# Patient Record
Sex: FEMALE | Race: WHITE | HISPANIC OR LATINO | URBAN - METROPOLITAN AREA
[De-identification: names, ages, dates, MRNs, and addresses within clinical notes are randomized per-mention and may not be internally consistent; named-entity substitution may affect disease eponyms.]

---

## 2017-03-29 ENCOUNTER — EMERGENCY (EMERGENCY)
Facility: HOSPITAL | Age: 62
LOS: 0 days | Discharge: HOME | End: 2017-03-29

## 2017-06-27 DIAGNOSIS — S01.81XD LACERATION WITHOUT FOREIGN BODY OF OTHER PART OF HEAD, SUBSEQUENT ENCOUNTER: ICD-10-CM
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
## 2017-08-27 ENCOUNTER — EMERGENCY (EMERGENCY)
Facility: HOSPITAL | Age: 62
LOS: 0 days | Discharge: HOME | End: 2017-08-27

## 2017-08-27 DIAGNOSIS — Z90.710 ACQUIRED ABSENCE OF BOTH CERVIX AND UTERUS: ICD-10-CM

## 2017-08-27 DIAGNOSIS — K80.70 CALCULUS OF GALLBLADDER AND BILE DUCT WITHOUT CHOLECYSTITIS WITHOUT OBSTRUCTION: ICD-10-CM

## 2017-08-27 DIAGNOSIS — Z98.890 OTHER SPECIFIED POSTPROCEDURAL STATES: ICD-10-CM

## 2017-08-27 DIAGNOSIS — K81.0 ACUTE CHOLECYSTITIS: ICD-10-CM

## 2017-08-27 DIAGNOSIS — Z90.49 ACQUIRED ABSENCE OF OTHER SPECIFIED PARTS OF DIGESTIVE TRACT: ICD-10-CM

## 2017-08-27 DIAGNOSIS — R10.11 RIGHT UPPER QUADRANT PAIN: ICD-10-CM

## 2017-08-28 ENCOUNTER — INPATIENT (INPATIENT)
Facility: HOSPITAL | Age: 62
LOS: 1 days | Discharge: HOME | End: 2017-08-30
Attending: SURGERY

## 2017-08-28 DIAGNOSIS — K81.0 ACUTE CHOLECYSTITIS: ICD-10-CM

## 2017-09-02 DIAGNOSIS — R10.9 UNSPECIFIED ABDOMINAL PAIN: ICD-10-CM

## 2017-09-02 DIAGNOSIS — K80.00 CALCULUS OF GALLBLADDER WITH ACUTE CHOLECYSTITIS WITHOUT OBSTRUCTION: ICD-10-CM

## 2017-09-02 DIAGNOSIS — Z90.710 ACQUIRED ABSENCE OF BOTH CERVIX AND UTERUS: ICD-10-CM

## 2017-09-02 DIAGNOSIS — Z87.442 PERSONAL HISTORY OF URINARY CALCULI: ICD-10-CM

## 2017-09-02 DIAGNOSIS — K82.8 OTHER SPECIFIED DISEASES OF GALLBLADDER: ICD-10-CM

## 2017-09-04 ENCOUNTER — EMERGENCY (EMERGENCY)
Facility: HOSPITAL | Age: 62
LOS: 0 days | Discharge: HOME | End: 2017-09-04

## 2017-09-04 DIAGNOSIS — R10.11 RIGHT UPPER QUADRANT PAIN: ICD-10-CM

## 2017-09-04 DIAGNOSIS — Z90.49 ACQUIRED ABSENCE OF OTHER SPECIFIED PARTS OF DIGESTIVE TRACT: ICD-10-CM

## 2017-09-04 DIAGNOSIS — R10.9 UNSPECIFIED ABDOMINAL PAIN: ICD-10-CM

## 2017-09-04 DIAGNOSIS — R10.31 RIGHT LOWER QUADRANT PAIN: ICD-10-CM

## 2017-09-04 DIAGNOSIS — G89.18 OTHER ACUTE POSTPROCEDURAL PAIN: ICD-10-CM

## 2017-09-04 DIAGNOSIS — K81.0 ACUTE CHOLECYSTITIS: ICD-10-CM

## 2017-09-05 ENCOUNTER — APPOINTMENT (OUTPATIENT)
Dept: SURGERY | Facility: CLINIC | Age: 62
End: 2017-09-05
Payer: COMMERCIAL

## 2017-09-05 VITALS — SYSTOLIC BLOOD PRESSURE: 119 MMHG | DIASTOLIC BLOOD PRESSURE: 85 MMHG | WEIGHT: 141 LBS

## 2017-09-05 PROCEDURE — 99024 POSTOP FOLLOW-UP VISIT: CPT

## 2017-09-11 ENCOUNTER — APPOINTMENT (OUTPATIENT)
Dept: SURGERY | Facility: CLINIC | Age: 62
End: 2017-09-11
Payer: MEDICAID

## 2017-09-11 VITALS
HEIGHT: 60 IN | WEIGHT: 135 LBS | BODY MASS INDEX: 26.5 KG/M2 | SYSTOLIC BLOOD PRESSURE: 115 MMHG | DIASTOLIC BLOOD PRESSURE: 80 MMHG

## 2017-09-11 DIAGNOSIS — Z83.1 FAMILY HISTORY OF OTHER INFECTIOUS AND PARASITIC DISEASES: ICD-10-CM

## 2017-09-11 DIAGNOSIS — Z78.9 OTHER SPECIFIED HEALTH STATUS: ICD-10-CM

## 2017-09-11 PROCEDURE — 99024 POSTOP FOLLOW-UP VISIT: CPT

## 2017-09-11 RX ORDER — IBUPROFEN 200 MG/1
200 TABLET, COATED ORAL
Refills: 0 | Status: ACTIVE | COMMUNITY

## 2017-10-12 ENCOUNTER — APPOINTMENT (OUTPATIENT)
Dept: INTERNAL MEDICINE | Facility: CLINIC | Age: 62
End: 2017-10-12

## 2018-01-30 ENCOUNTER — EMERGENCY (EMERGENCY)
Facility: HOSPITAL | Age: 63
LOS: 0 days | Discharge: HOME | End: 2018-01-30

## 2018-01-30 DIAGNOSIS — R42 DIZZINESS AND GIDDINESS: ICD-10-CM

## 2018-01-30 DIAGNOSIS — R63.0 ANOREXIA: ICD-10-CM

## 2018-01-30 DIAGNOSIS — K81.0 ACUTE CHOLECYSTITIS: ICD-10-CM

## 2018-01-30 DIAGNOSIS — F17.200 NICOTINE DEPENDENCE, UNSPECIFIED, UNCOMPLICATED: ICD-10-CM

## 2018-01-30 DIAGNOSIS — Z90.49 ACQUIRED ABSENCE OF OTHER SPECIFIED PARTS OF DIGESTIVE TRACT: ICD-10-CM

## 2018-01-30 DIAGNOSIS — R53.1 WEAKNESS: ICD-10-CM

## 2018-03-09 ENCOUNTER — APPOINTMENT (OUTPATIENT)
Dept: INTERNAL MEDICINE | Facility: CLINIC | Age: 63
End: 2018-03-09

## 2018-07-28 PROBLEM — Z78.9 ALCOHOL USE: Status: INACTIVE | Noted: 2017-09-11

## 2019-06-14 ENCOUNTER — LABORATORY RESULT (OUTPATIENT)
Age: 64
End: 2019-06-14

## 2019-06-14 ENCOUNTER — OUTPATIENT (OUTPATIENT)
Dept: OUTPATIENT SERVICES | Facility: HOSPITAL | Age: 64
LOS: 1 days | Discharge: HOME | End: 2019-06-14

## 2019-06-14 ENCOUNTER — APPOINTMENT (OUTPATIENT)
Dept: INTERNAL MEDICINE | Facility: CLINIC | Age: 64
End: 2019-06-14

## 2019-06-14 VITALS
WEIGHT: 155 LBS | TEMPERATURE: 95.6 F | HEART RATE: 73 BPM | BODY MASS INDEX: 30.43 KG/M2 | DIASTOLIC BLOOD PRESSURE: 87 MMHG | HEIGHT: 60 IN | SYSTOLIC BLOOD PRESSURE: 124 MMHG

## 2019-06-14 DIAGNOSIS — R30.0 DYSURIA: ICD-10-CM

## 2019-06-14 DIAGNOSIS — N89.8 OTHER SPECIFIED NONINFLAMMATORY DISORDERS OF VAGINA: ICD-10-CM

## 2019-06-14 DIAGNOSIS — R53.83 OTHER FATIGUE: ICD-10-CM

## 2019-06-14 DIAGNOSIS — Z72.0 TOBACCO USE: ICD-10-CM

## 2019-06-14 DIAGNOSIS — Z87.442 PERSONAL HISTORY OF URINARY CALCULI: ICD-10-CM

## 2019-06-14 RX ORDER — CONJUGATED ESTROGENS 0.62 MG/G
0.62 CREAM VAGINAL
Qty: 1 | Refills: 0 | Status: ACTIVE | COMMUNITY
Start: 2019-06-14 | End: 1900-01-01

## 2019-06-14 NOTE — COUNSELING
[Healthy eating counseling provided] : healthy eating [Weight management counseling provided] : Weight management [Activity counseling provided] : activity [Discussed Risks and Advised to Quit Smoking] : Discussed risks and advised to quit smoking [Weight Self Once Weekly] : Weight self once weekly [Low Fat Diet] : Low fat diet [Decrease Portions] : Decrease food portions [Walking] : Walking [Quit Smoking] : Quit Smoking

## 2019-06-17 DIAGNOSIS — N89.8 OTHER SPECIFIED NONINFLAMMATORY DISORDERS OF VAGINA: ICD-10-CM

## 2019-06-17 DIAGNOSIS — Z72.0 TOBACCO USE: ICD-10-CM

## 2019-06-17 DIAGNOSIS — R30.0 DYSURIA: ICD-10-CM

## 2019-06-17 DIAGNOSIS — R53.83 OTHER FATIGUE: ICD-10-CM

## 2019-06-17 DIAGNOSIS — Z87.442 PERSONAL HISTORY OF URINARY CALCULI: ICD-10-CM

## 2019-06-17 LAB
25(OH)D3 SERPL-MCNC: 22 NG/ML
ALBUMIN SERPL ELPH-MCNC: 4.7 G/DL
ALP BLD-CCNC: 114 U/L
ALT SERPL-CCNC: 16 U/L
ANION GAP SERPL CALC-SCNC: 14 MMOL/L
AST SERPL-CCNC: 19 U/L
BACTERIA UR CULT: NORMAL
BASOPHILS # BLD AUTO: 0.01 K/UL
BASOPHILS NFR BLD AUTO: 0.2 %
BILIRUB SERPL-MCNC: 0.6 MG/DL
BUN SERPL-MCNC: 16 MG/DL
CALCIUM SERPL-MCNC: 9.1 MG/DL
CHLORIDE SERPL-SCNC: 106 MMOL/L
CHOLEST SERPL-MCNC: 185 MG/DL
CHOLEST/HDLC SERPL: 3.9 RATIO
CO2 SERPL-SCNC: 25 MMOL/L
CREAT SERPL-MCNC: 0.6 MG/DL
EOSINOPHIL # BLD AUTO: 0.12 K/UL
EOSINOPHIL NFR BLD AUTO: 2.7 %
ESTIMATED AVERAGE GLUCOSE: 117 MG/DL
GLUCOSE SERPL-MCNC: 98 MG/DL
HBA1C MFR BLD HPLC: 5.7 %
HCT VFR BLD CALC: 40.3 %
HDLC SERPL-MCNC: 48 MG/DL
HGB BLD-MCNC: 13.5 G/DL
IMM GRANULOCYTES NFR BLD AUTO: 0.4 %
LDLC SERPL CALC-MCNC: 130 MG/DL
LYMPHOCYTES # BLD AUTO: 1.76 K/UL
LYMPHOCYTES NFR BLD AUTO: 39.2 %
MAN DIFF?: NORMAL
MCHC RBC-ENTMCNC: 30.1 PG
MCHC RBC-ENTMCNC: 33.5 G/DL
MCV RBC AUTO: 89.8 FL
MONOCYTES # BLD AUTO: 0.46 K/UL
MONOCYTES NFR BLD AUTO: 10.2 %
NEUTROPHILS # BLD AUTO: 2.12 K/UL
NEUTROPHILS NFR BLD AUTO: 47.3 %
PLATELET # BLD AUTO: 213 K/UL
POTASSIUM SERPL-SCNC: 4.6 MMOL/L
PROT SERPL-MCNC: 7.1 G/DL
RBC # BLD: 4.49 M/UL
RBC # FLD: 12 %
SODIUM SERPL-SCNC: 145 MMOL/L
TRIGL SERPL-MCNC: 130 MG/DL
TSH SERPL-ACNC: 2.1 UIU/ML
WBC # FLD AUTO: 4.49 K/UL

## 2019-06-18 NOTE — ASSESSMENT
[FreeTextEntry1] : This is a 63 year old female with no known PMHx presenting to the clinic to establish care. \par \par FATIGUE a/w LOWER EXTREMITY PAIN; POSSIBLY SECONDARY TO VARICOSE VEINS\par - Advised to wear compression stocking\par \par VAGINAL DRYNESS\par - Estrogen cream\par \par DYSURIA\par - Will check urinalysis\par \par HEALTH CARE MAINTENANCE\par - Pap smear 1 year ago, but doesn’t know the result\par - Follow up Mammogram\par - Follow up with GI for colonoscopy\par - Follow up lab work

## 2019-06-18 NOTE — REVIEW OF SYSTEMS
[Dysuria] : dysuria [Fatigue] : fatigue [Negative] : Heme/Lymph [FreeTextEntry9] : Lower extremity pain worsened with walking and prolonged standing

## 2019-06-18 NOTE — PHYSICAL EXAM
[No Acute Distress] : no acute distress [Well Nourished] : well nourished [Well Developed] : well developed [Well-Appearing] : well-appearing [Normal Sclera/Conjunctiva] : normal sclera/conjunctiva [PERRL] : pupils equal round and reactive to light [EOMI] : extraocular movements intact [Normal Outer Ear/Nose] : the outer ears and nose were normal in appearance [No JVD] : no jugular venous distention [Supple] : supple [No Respiratory Distress] : no respiratory distress  [Clear to Auscultation] : lungs were clear to auscultation bilaterally [Normal Rate] : normal rate  [Normal S1, S2] : normal S1 and S2 [Regular Rhythm] : with a regular rhythm [No Edema] : there was no peripheral edema [Soft] : abdomen soft [Non Tender] : non-tender [Non-distended] : non-distended [No CVA Tenderness] : no CVA  tenderness [No Rash] : no rash [No Spinal Tenderness] : no spinal tenderness [Coordination Grossly Intact] : coordination grossly intact [Normal Gait] : normal gait [No Focal Deficits] : no focal deficits [Normal Affect] : the affect was normal [Normal Insight/Judgement] : insight and judgment were intact [de-identified] : Varicose veins BL

## 2019-06-18 NOTE — HISTORY OF PRESENT ILLNESS
[FreeTextEntry1] : Establish care [de-identified] : This is a 63 year old female presenting to the clinic for the first time to establish care. She has a no known significant PMHx. She reports history of Cholecystectomy and Nephrolithiasis s/p Lithotripsy. today, she complains of feeling tired and pain in her legs that she described as dull. He pain is worse with walking and with prolonged standing at work. She also complains of acute dysuria (burning) without evidence of blood, back pain, or systemic signs of infection (fever, chills, ect.). ROS is otherwise negative.

## 2019-06-26 ENCOUNTER — EMERGENCY (EMERGENCY)
Facility: HOSPITAL | Age: 64
LOS: 0 days | Discharge: HOME | End: 2019-06-26
Admitting: EMERGENCY MEDICINE
Payer: MEDICAID

## 2019-06-26 VITALS
TEMPERATURE: 98 F | RESPIRATION RATE: 18 BRPM | SYSTOLIC BLOOD PRESSURE: 130 MMHG | DIASTOLIC BLOOD PRESSURE: 60 MMHG | HEART RATE: 80 BPM | OXYGEN SATURATION: 98 %
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
## 2019-06-26 DIAGNOSIS — S20.212A CONTUSION OF LEFT FRONT WALL OF THORAX, INITIAL ENCOUNTER: ICD-10-CM

## 2019-06-26 DIAGNOSIS — S60.212A CONTUSION OF LEFT WRIST, INITIAL ENCOUNTER: ICD-10-CM
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
## 2019-06-26 DIAGNOSIS — R07.81 PLEURODYNIA: ICD-10-CM

## 2019-06-26 PROCEDURE — 99283 EMERGENCY DEPT VISIT LOW MDM: CPT

## 2019-06-26 PROCEDURE — 73110 X-RAY EXAM OF WRIST: CPT | Mod: 26,LT

## 2019-06-26 PROCEDURE — 71046 X-RAY EXAM CHEST 2 VIEWS: CPT | Mod: 26

## 2019-06-26 PROCEDURE — 71100 X-RAY EXAM RIBS UNI 2 VIEWS: CPT | Mod: 26,LT

## 2019-06-26 RX ORDER — OXYCODONE AND ACETAMINOPHEN 5; 325 MG/1; MG/1
1 TABLET ORAL ONCE
Refills: 0 | Status: DISCONTINUED | OUTPATIENT
Start: 2019-06-26 | End: 2019-06-26

## 2019-06-26 RX ADMIN — OXYCODONE AND ACETAMINOPHEN 1 TABLET(S): 5; 325 TABLET ORAL at 10:19

## 2019-06-26 NOTE — ED PROVIDER NOTE - OBJECTIVE STATEMENT
65 yo F with no pmhx presenting with left sided rib pain and left wrist pain which started after mechanical fall on 6/24/19. Patient tripped on street and fell onto her left side. No head injury or LOC. No anticoagulation. Symptoms are moderate. Pain is worse with movement and palpation. No cp, sob, fever, chills, abdominal pain, nausea, vomiting, diarrhea, back pain, urinary symptoms, headache, dizziness, paresthesias, or weakness.

## 2019-06-26 NOTE — ED PROVIDER NOTE - CLINICAL SUMMARY MEDICAL DECISION MAKING FREE TEXT BOX
Pt with rib contusion and wrist contusion after mechanical fall. NSAIDS and incentive spirometer given. I have discussed the discharge plan with the patient. The patient agrees with the plan, as discussed.  The patient understands Emergency Department diagnosis is a preliminary diagnosis often based on limited information and that the patient must adhere to the follow-up plan as discussed.  The patient understands that if the symptoms worsen or if prescribed medications do not have the desired/planned effect that the patient may return to the Emergency Department at any time for further evaluation and treatment.

## 2019-06-26 NOTE — ED PROVIDER NOTE - NS ED ROS FT
Review of Systems:  	•	CONSTITUTIONAL - no fever, no diaphoresis, no chills  	•	SKIN - no rash  	•	HEMATOLOGIC - no bleeding, no bruising  	•	EYES - no eye pain, no blurry vision  	•	ENT - no congestion  	•	RESPIRATORY - no shortness of breath, no cough  	•	CARDIAC - no chest pain, no palpitations  	•	GI - no abd pain, no nausea, no vomiting, no diarrhea, no constipation  	•	GENITO-URINARY - no dysuria; no hematuria, no increased urinary frequency  	•	MUSCULOSKELETAL - +rib pain, wrist pain, no swelling, no redness  	•	NEUROLOGIC - no weakness, no headache, no paresthesias, no LOC  	•	PSYCH - no anxiety, no depression  	All other ROS are negative except as documented in HPI.

## 2019-06-26 NOTE — ED PROVIDER NOTE - PHYSICAL EXAMINATION
VITAL SIGNS: I have reviewed nursing notes and confirm.  CONSTITUTIONAL: Well-developed; well-nourished; in no acute distress.  SKIN: Skin exam is warm and dry, no acute rash. No ecchymosis or abrasions.   HEAD: Normocephalic; atraumatic.  EYES: PERRL, EOM intact; conjunctiva and sclera clear.  ENT: No nasal discharge; airway clear.   NECK: Supple; non tender.  CARD: S1, S2 normal; no murmurs, gallops, or rubs. Regular rate and rhythm.  RESP: Clear to auscultation bilaterally. No wheezes, rales or rhonchi.  ABD: Normal bowel sounds; soft; non-distended; non-tender.   EXT/MSK: Normal ROM. No edema. +Tenderness to anterior left sided costal region. No crepitus. Mild tenderness to left wrist with no swelling or bony deformities.   LYMPH: No acute cervical adenopathy.  NEURO: Alert, oriented. Grossly unremarkable. No focal deficits.  PSYCH: Cooperative, appropriate.

## 2019-06-26 NOTE — ED PROVIDER NOTE - NSFOLLOWUPINSTRUCTIONS_ED_ALL_ED_FT
Contusión costal  Rib Contusion  Introducción  La contusión costal es un moretón profundo en la dariusz de las costillas. Las contusiones son el resultado de un traumatismo cerrado que causa hemorragia y lesión en los tejidos subcutáneos. La contusión costal puede incluir hematomas en las costillas, en la piel y los músculos de la dariusz. La piel sobre la contusión puede tornarse de color jane, israel o amarillo. Las lesiones menores causarán johnnie contusión indolora. Las contusiones más graves pueden causar dolor y permanecer hinchadas caty algunas semanas.    ¿Cuáles son las causas?  Generalmente, esta afección se debe a un golpe, un traumatismo o johnnie fuerza directa en johnnie dariusz del cuerpo. [place] suele ocurrir mientras se practican deportes de contacto.    ¿Cuáles son los signos o los síntomas?  Los síntomas de esta afección incluyen los siguientes:  Hinchazón y enrojecimiento en la dariusz de la lesión.  Cambio de color de la dariusz lesionada.  Dolor con la palpación y sensibilidad en la dariusz de la lesión.  Dolor al moverse o al estar quieto.  ¿Cómo se diagnostica?  Esta afección se puede diagnosticar en función de lo siguiente:  Los síntomas y antecedentes médicos.  Un examen físico.  Estudios de diagnóstico por imágenes, david johnnie radiografía, exploración por tomografía computarizada (TC) o resonancia magnética (RM), para determinar si hubo lesiones internas o huesos fracturados (fracturas).  ¿Cómo se trata?  El tratamiento de esta afección puede incluir:  Reposo. A menudo, gregory es el mejor tratamiento para johnnie contusión costal.  Glaseado. [place] reducirá la hinchazón y la inflamación.  Practicar ejercicios de respiración profunda. [place] se puede recomendar para reducir el riesgo de colapso pulmonar parcial y neumonía.  Medicamentos. Es posible que también deba min medicamentos recetados y de venta beatris para controlar el dolor.  Inyección de un anestésico alrededor del nervio cerca de la lesión (bloqueo nervioso).  Siga estas indicaciones en pfeiffer casa:  Image Image   Medicamentos     [place] los medicamentos de venta beatris y los recetados solamente david se lo haya indicado el médico.  No conduzca ni use maquinaria pesada mientras rj analgésicos recetados.  Si rj analgésicos recetados, adopte medidas para prevenir o tratar el estreñimiento. El médico puede recomendarle que:   Jeny suficiente líquido david para mantener la orina de color amarillo pálido.   Consuma alimentos ricos en fibra, david frutas y verduras frescas, cereales integrales y frijoles.   Limite el consumo de alimentos ricos en grasa y azúcares procesados, david los alimentos fritos o dulces.   [place] un medicamento recetado o de venta beatris para el estreñimiento.  Control del dolor, la rigidez y la hinchazón     Si se lo indican, aplique hielo sobre la dariusz de la lesión:  Ponga el hielo en johnnie bolsa plástica.  Coloque johnnie toalla entre la piel y la bolsa de hielo.  Coloque el hielo caty 20 minutos, 2 a 3 veces por día.  Mantenga la dariusz de la lesión en reposo. No realice actividades extenuantes ni actividades o movimientos que le causen dolor. Sea cuidadoso al realizar actividades y evite golpearse la dariusz lesionada.  No levante ningún objeto que pese más de 5 libras (2,3 kg) o el límite de peso que le hayan indicado, hasta que el médico le diga que puede hacerlo.   Instrucciones generales     No consuma ningún producto que contenga nicotina o tabaco, david cigarrillos y cigarrillos electrónicos. Estos pueden retrasar la recuperación. Si necesita ayuda para dejar de fumar, consulte al médico.  [place] ejercicios de respiración profunda david se lo haya indicado el médico.  Si le entregan un espirómetro incentivo, úselo cada 1 o 2 horas mientras está despierto o según lo recomendado por pfeiffer médico. [place] dispositivo mide qué santana radha llena los pulmones con cada respiración.  Concurra a todas las visitas de seguimiento david se lo haya indicado el médico. [place] es importante.  Comuníquese con un médico si tiene:  Aumento del hematoma o la hinchazón.  Dolor no se valery con el tratamiento.  Fiebre.  Solicite ayuda inmediatamente si:  Tiene dificultad para respirar o le falta el aire.  Desarrolla johnnie tos permanente o expectora esputo espeso o con eric.  Siente náuseas o vomita.  Tiene dolor en el abdomen.  Resumen  La contusión costal es un moretón profundo en la dariusz de las costillas. Las contusiones son el resultado de un traumatismo cerrado que causa hemorragia y lesión en los tejidos subcutáneos.  La piel sobre la contusión puede ponerse de color jane, israel o amarillo. Las lesiones menores pueden causar johnnie contusión indolora. Las contusiones más graves pueden causar dolor y permanecer hinchadas caty algunas semanas.  Mantenga la dariusz de la lesión en reposo. No realice actividades extenuantes ni actividades o movimientos que le causen dolor.  Esta información no tiene david fin reemplazar el consejo del médico. Asegúrese de hacerle al médico cualquier pregunta que tenga. Contusión costal  Rib Contusion  Introducción  La contusión costal es un moretón profundo en la dariusz de las costillas. Las contusiones son el resultado de un traumatismo cerrado que causa hemorragia y lesión en los tejidos subcutáneos. La contusión costal puede incluir hematomas en las costillas, en la piel y los músculos de la dariusz. La piel sobre la contusión puede tornarse de color jane, israel o amarillo. Las lesiones menores causarán johnnie contusión indolora. Las contusiones más graves pueden causar dolor y permanecer hinchadas caty algunas semanas.    ¿Cuáles son las causas?  Generalmente, esta afección se debe a un golpe, un traumatismo o johnnie fuerza directa en johnnie dariusz del cuerpo. [place] suele ocurrir mientras se practican deportes de contacto.    ¿Cuáles son los signos o los síntomas?  Los síntomas de esta afección incluyen los siguientes:  Hinchazón y enrojecimiento en la dariusz de la lesión.  Cambio de color de la dariusz lesionada.  Dolor con la palpación y sensibilidad en la dariusz de la lesión.  Dolor al moverse o al estar quieto.  ¿Cómo se diagnostica?  Esta afección se puede diagnosticar en función de lo siguiente:  Los síntomas y antecedentes médicos.  Un examen físico.  Estudios de diagnóstico por imágenes, david johnnie radiografía, exploración por tomografía computarizada (TC) o resonancia magnética (RM), para determinar si hubo lesiones internas o huesos fracturados (fracturas).  ¿Cómo se trata?  El tratamiento de esta afección puede incluir:  Reposo. A menudo, gregory es el mejor tratamiento para johnnie contusión costal.  Glaseado. [place] reducirá la hinchazón y la inflamación.  Practicar ejercicios de respiración profunda. [place] se puede recomendar para reducir el riesgo de colapso pulmonar parcial y neumonía.  Medicamentos. Es posible que también deba min medicamentos recetados y de venta beatris para controlar el dolor.  Inyección de un anestésico alrededor del nervio cerca de la lesión (bloqueo nervioso).  Siga estas indicaciones en pfeiffer casa:  Image Image   Medicamentos     [place] los medicamentos de venta beatris y los recetados solamente david se lo haya indicado el médico.  No conduzca ni use maquinaria pesada mientras rj analgésicos recetados.  Si rj analgésicos recetados, adopte medidas para prevenir o tratar el estreñimiento. El médico puede recomendarle que:   Jeny suficiente líquido david para mantener la orina de color amarillo pálido.   Consuma alimentos ricos en fibra, david frutas y verduras frescas, cereales integrales y frijoles.   Limite el consumo de alimentos ricos en grasa y azúcares procesados, david los alimentos fritos o dulces.   [place] un medicamento recetado o de venta beatris para el estreñimiento.  Control del dolor, la rigidez y la hinchazón     Si se lo indican, aplique hielo sobre la dariusz de la lesión:  Ponga el hielo en johnnie bolsa plástica.  Coloque johnnie toalla entre la piel y la bolsa de hielo.  Coloque el hielo caty 20 minutos, 2 a 3 veces por día.  Mantenga la dariusz de la lesión en reposo. No realice actividades extenuantes ni actividades o movimientos que le causen dolor. Sea cuidadoso al realizar actividades y evite golpearse la dariusz lesionada.  No levante ningún objeto que pese más de 5 libras (2,3 kg) o el límite de peso que le hayan indicado, hasta que el médico le diga que puede hacerlo.   Instrucciones generales     No consuma ningún producto que contenga nicotina o tabaco, david cigarrillos y cigarrillos electrónicos. Estos pueden retrasar la recuperación. Si necesita ayuda para dejar de fumar, consulte al médico.  [place] ejercicios de respiración profunda david se lo haya indicado el médico.  Si le entregan un espirómetro incentivo, úselo cada 1 o 2 horas mientras está despierto o según lo recomendado por pfeiffer médico. [place] dispositivo mide qué santana radha llena los pulmones con cada respiración.  Concurra a todas las visitas de seguimiento david se lo haya indicado el médico. [place] es importante.  Comuníquese con un médico si tiene:  Aumento del hematoma o la hinchazón.  Dolor no se valery con el tratamiento.  Fiebre.  Solicite ayuda inmediatamente si:  Tiene dificultad para respirar o le falta el aire.  Desarrolla johnnie tos permanente o expectora esputo espeso o con eric.  Siente náuseas o vomita.  Tiene dolor en el abdomen.  Resumen  La contusión costal es un moretón profundo en la dariusz de las costillas. Las contusiones son el resultado de un traumatismo cerrado que causa hemorragia y lesión en los tejidos subcutáneos.  La piel sobre la contusión puede ponerse de color jane, israel o amarillo. Las lesiones menores pueden causar johnnie contusión indolora. Las contusiones más graves pueden causar dolor y permanecer hinchadas caty algunas semanas.  Mantenga la dariusz de la lesión en reposo. No realice actividades extenuantes ni actividades o movimientos que le causen dolor.  Esta información no tiene david fin reemplazar el consejo del médico. Asegúrese de hacerle al médico cualquier pregunta que tenga.    Contusion    A contusion is a deep bruise. Contusions are the result of a blunt injury to tissues and muscle fibers under the skin. The skin overlying the contusion may turn blue, purple, or yellow. Symptoms also include pain and swelling in the injured area.    SEEK IMMEDIATE MEDICAL CARE IF YOU HAVE THE FOLLOWING SYMPTOMS: severe pain, numbness, tingling, pain, weakness, or skin color/temperature change in any part of your body distal to the fracture.

## 2019-08-20 ENCOUNTER — EMERGENCY (EMERGENCY)
Facility: HOSPITAL | Age: 64
LOS: 0 days | Discharge: HOME | End: 2019-08-20
Attending: EMERGENCY MEDICINE | Admitting: EMERGENCY MEDICINE
Payer: MEDICAID

## 2019-08-20 VITALS
DIASTOLIC BLOOD PRESSURE: 84 MMHG | RESPIRATION RATE: 18 BRPM | HEART RATE: 110 BPM | TEMPERATURE: 102 F | SYSTOLIC BLOOD PRESSURE: 155 MMHG | OXYGEN SATURATION: 96 %

## 2019-08-20 VITALS
OXYGEN SATURATION: 97 % | RESPIRATION RATE: 16 BRPM | SYSTOLIC BLOOD PRESSURE: 132 MMHG | HEART RATE: 75 BPM | TEMPERATURE: 98 F | DIASTOLIC BLOOD PRESSURE: 71 MMHG

## 2019-08-20 DIAGNOSIS — R35.0 FREQUENCY OF MICTURITION: ICD-10-CM

## 2019-08-20 DIAGNOSIS — N39.0 URINARY TRACT INFECTION, SITE NOT SPECIFIED: ICD-10-CM

## 2019-08-20 DIAGNOSIS — R30.0 DYSURIA: ICD-10-CM

## 2019-08-20 DIAGNOSIS — R50.9 FEVER, UNSPECIFIED: ICD-10-CM

## 2019-08-20 DIAGNOSIS — Z90.49 ACQUIRED ABSENCE OF OTHER SPECIFIED PARTS OF DIGESTIVE TRACT: ICD-10-CM

## 2019-08-20 LAB
ANION GAP SERPL CALC-SCNC: 11 MMOL/L — SIGNIFICANT CHANGE UP (ref 7–14)
APPEARANCE UR: ABNORMAL
BACTERIA # UR AUTO: ABNORMAL
BASOPHILS # BLD AUTO: 0.01 K/UL — SIGNIFICANT CHANGE UP (ref 0–0.2)
BASOPHILS NFR BLD AUTO: 0.1 % — SIGNIFICANT CHANGE UP (ref 0–1)
BILIRUB UR-MCNC: NEGATIVE — SIGNIFICANT CHANGE UP
BUN SERPL-MCNC: 13 MG/DL — SIGNIFICANT CHANGE UP (ref 10–20)
CALCIUM SERPL-MCNC: 8.9 MG/DL — SIGNIFICANT CHANGE UP (ref 8.5–10.1)
CHLORIDE SERPL-SCNC: 105 MMOL/L — SIGNIFICANT CHANGE UP (ref 98–110)
CO2 SERPL-SCNC: 23 MMOL/L — SIGNIFICANT CHANGE UP (ref 17–32)
COLOR SPEC: YELLOW — SIGNIFICANT CHANGE UP
CREAT SERPL-MCNC: 0.6 MG/DL — LOW (ref 0.7–1.5)
DIFF PNL FLD: ABNORMAL
EOSINOPHIL # BLD AUTO: 0.02 K/UL — SIGNIFICANT CHANGE UP (ref 0–0.7)
EOSINOPHIL NFR BLD AUTO: 0.2 % — SIGNIFICANT CHANGE UP (ref 0–8)
EPI CELLS # UR: 3 /HPF — SIGNIFICANT CHANGE UP (ref 0–5)
GLUCOSE SERPL-MCNC: 154 MG/DL — HIGH (ref 70–99)
GLUCOSE UR QL: NEGATIVE — SIGNIFICANT CHANGE UP
HCT VFR BLD CALC: 37 % — SIGNIFICANT CHANGE UP (ref 37–47)
HGB BLD-MCNC: 12.5 G/DL — SIGNIFICANT CHANGE UP (ref 12–16)
HYALINE CASTS # UR AUTO: 6 /LPF — SIGNIFICANT CHANGE UP (ref 0–7)
IMM GRANULOCYTES NFR BLD AUTO: 0.7 % — HIGH (ref 0.1–0.3)
KETONES UR-MCNC: NEGATIVE — SIGNIFICANT CHANGE UP
LACTATE SERPL-SCNC: 1 MMOL/L — SIGNIFICANT CHANGE UP (ref 0.5–2.2)
LEUKOCYTE ESTERASE UR-ACNC: ABNORMAL
LYMPHOCYTES # BLD AUTO: 0.42 K/UL — LOW (ref 1.2–3.4)
LYMPHOCYTES # BLD AUTO: 4 % — LOW (ref 20.5–51.1)
MCHC RBC-ENTMCNC: 30.3 PG — SIGNIFICANT CHANGE UP (ref 27–31)
MCHC RBC-ENTMCNC: 33.8 G/DL — SIGNIFICANT CHANGE UP (ref 32–37)
MCV RBC AUTO: 89.6 FL — SIGNIFICANT CHANGE UP (ref 81–99)
MONOCYTES # BLD AUTO: 0.84 K/UL — HIGH (ref 0.1–0.6)
MONOCYTES NFR BLD AUTO: 7.9 % — SIGNIFICANT CHANGE UP (ref 1.7–9.3)
NEUTROPHILS # BLD AUTO: 9.21 K/UL — HIGH (ref 1.4–6.5)
NEUTROPHILS NFR BLD AUTO: 87.1 % — HIGH (ref 42.2–75.2)
NITRITE UR-MCNC: POSITIVE
NRBC # BLD: 0 /100 WBCS — SIGNIFICANT CHANGE UP (ref 0–0)
PH UR: 6 — SIGNIFICANT CHANGE UP (ref 5–8)
PLATELET # BLD AUTO: 165 K/UL — SIGNIFICANT CHANGE UP (ref 130–400)
POTASSIUM SERPL-MCNC: 4.2 MMOL/L — SIGNIFICANT CHANGE UP (ref 3.5–5)
POTASSIUM SERPL-SCNC: 4.2 MMOL/L — SIGNIFICANT CHANGE UP (ref 3.5–5)
PROT UR-MCNC: ABNORMAL
RBC # BLD: 4.13 M/UL — LOW (ref 4.2–5.4)
RBC # FLD: 11.8 % — SIGNIFICANT CHANGE UP (ref 11.5–14.5)
RBC CASTS # UR COMP ASSIST: 3 /HPF — SIGNIFICANT CHANGE UP (ref 0–4)
SODIUM SERPL-SCNC: 139 MMOL/L — SIGNIFICANT CHANGE UP (ref 135–146)
SP GR SPEC: 1.02 — SIGNIFICANT CHANGE UP (ref 1.01–1.02)
UROBILINOGEN FLD QL: SIGNIFICANT CHANGE UP
WBC # BLD: 10.57 K/UL — SIGNIFICANT CHANGE UP (ref 4.8–10.8)
WBC # FLD AUTO: 10.57 K/UL — SIGNIFICANT CHANGE UP (ref 4.8–10.8)
WBC UR QL: 508 /HPF — HIGH (ref 0–5)

## 2019-08-20 PROCEDURE — 99284 EMERGENCY DEPT VISIT MOD MDM: CPT

## 2019-08-20 PROCEDURE — 74177 CT ABD & PELVIS W/CONTRAST: CPT | Mod: 26

## 2019-08-20 RX ORDER — SODIUM CHLORIDE 9 MG/ML
1000 INJECTION, SOLUTION INTRAVENOUS ONCE
Refills: 0 | Status: COMPLETED | OUTPATIENT
Start: 2019-08-20 | End: 2019-08-20

## 2019-08-20 RX ORDER — CEFPODOXIME PROXETIL 100 MG
1 TABLET ORAL
Qty: 24 | Refills: 0
Start: 2019-08-20 | End: 2019-08-31

## 2019-08-20 RX ORDER — ACETAMINOPHEN 500 MG
975 TABLET ORAL ONCE
Refills: 0 | Status: COMPLETED | OUTPATIENT
Start: 2019-08-20 | End: 2019-08-20

## 2019-08-20 RX ORDER — CEFTRIAXONE 500 MG/1
1000 INJECTION, POWDER, FOR SOLUTION INTRAMUSCULAR; INTRAVENOUS ONCE
Refills: 0 | Status: COMPLETED | OUTPATIENT
Start: 2019-08-20 | End: 2019-08-20

## 2019-08-20 RX ADMIN — CEFTRIAXONE 100 MILLIGRAM(S): 500 INJECTION, POWDER, FOR SOLUTION INTRAMUSCULAR; INTRAVENOUS at 03:44

## 2019-08-20 RX ADMIN — SODIUM CHLORIDE 1000 MILLILITER(S): 9 INJECTION, SOLUTION INTRAVENOUS at 02:35

## 2019-08-20 RX ADMIN — SODIUM CHLORIDE 1000 MILLILITER(S): 9 INJECTION, SOLUTION INTRAVENOUS at 01:39

## 2019-08-20 RX ADMIN — CEFTRIAXONE 1000 MILLIGRAM(S): 500 INJECTION, POWDER, FOR SOLUTION INTRAMUSCULAR; INTRAVENOUS at 04:25

## 2019-08-20 RX ADMIN — Medication 975 MILLIGRAM(S): at 01:39

## 2019-08-20 NOTE — ED PROVIDER NOTE - CARE PLAN
Principal Discharge DX:	Urinary tract infection without hematuria, site unspecified  Assessment and plan of treatment:	ascending infection

## 2019-08-20 NOTE — ED ADULT NURSE NOTE - NSIMPLEMENTINTERV_GEN_ALL_ED
Implemented All Universal Safety Interventions:  [place] to call system. Call bell, personal items and telephone within reach. Instruct patient to call for assistance. Room bathroom lighting operational. Non-slip footwear when patient is off stretcher. Physically safe environment: no spills, clutter or unnecessary equipment. Stretcher in lowest position, wheels locked, appropriate side rails in place.

## 2019-08-20 NOTE — ED ADULT TRIAGE NOTE - CHIEF COMPLAINT QUOTE
"I've been having painful urination for 3 days. My feet are hot." has pain to lower abdomen, reports nausea. reports fever since yesterday

## 2019-08-20 NOTE — ED PROVIDER NOTE - OBJECTIVE STATEMENT
pt with pmhx cholecystectomy and kidney stones presents to ED with 1 week h/o dysuria, frequency, urgency and SP pressure; fever started today, subjective  Denies chill/HA/dizziness/chest pain/palpitation/sob/abd pain/n/v/d/ black stool/bloody stool

## 2019-08-20 NOTE — ED PROVIDER NOTE - PROGRESS NOTE DETAILS
plan for CT, urine, labs, IVF, tylenol and reassess Reviewed all results and necessity for follow up. Counseled on red flags and to return for them.  Patient appears well on discharge.

## 2019-08-20 NOTE — ED PROVIDER NOTE - ATTENDING CONTRIBUTION TO CARE
65 yo female with pmh of cholecystectomy and kidney stones presents to the ER for one week of dysuria, frequency, urgency, and suprapubic pressure. Pt states she has fever but no chills. Pt denies any rash/CP/SOB/dizziness/N/V/D/blood in urine. No flank pain. Agree with PA management. Will check labs, urine, CT scan. To reassess.

## 2019-08-20 NOTE — ED PROVIDER NOTE - NS ED ROS FT
Constitutional: no chills, no recent weight loss, change in appetite or malaise  Eyes: no redness/discharge/pain/vision changes  ENT: no rhinorrhea/ear pain/sore throat  Cardiac: No chest pain, SOB or edema.  Respiratory: No cough or respiratory distress  GI: No nausea, vomiting, diarrhea or abdominal pain.  : No hematuria  MS: no pain to back or extremities, no loss of ROM, no weakness  Neuro: No headache or weakness. No LOC.  Skin: No skin rash.  Endocrine: No history of thyroid disease or diabetes.  Except as documented in the HPI, all other systems are negative.

## 2019-08-20 NOTE — ED PROVIDER NOTE - NSFOLLOWUPINSTRUCTIONS_ED_ALL_ED_FT
Urinary Tract Infection    A urinary tract infection (UTI) is an infection of any part of the urinary tract, which includes the kidneys, ureters, bladder, and urethra. Risk factors include ignoring your need to urinate, wiping back to front if female, being an uncircumcised male, and having diabetes or a weak immune system. Symptoms include frequent urination, pain or burning with urination, foul smelling urine, cloudy urine, pain in the lower abdomen, blood in the urine, and fever. If you were prescribed an antibiotic medicine, take it as told by your health care provider. Do not stop taking the antibiotic even if you start to feel better.    SEEK IMMEDIATE MEDICAL CARE IF YOU HAVE THE FOLLOWING SYMPTOMS: severe back or abdominal pain, inability to keep fluids or medicine down, dizziness/lightheadedness, or a change in mental status.      Pyelonephritis    Pyelonephritis is a kidney infection. The kidneys are the organs that filter a person's blood and move waste out of the bloodstream and into the urine. Urine passes from the kidneys, through the ureters, and into the bladder. In most cases, the infection clears up with treatment and does not cause further problems. More severe infections or chronic infections can sometimes spread to the bloodstream or lead to other problems with the kidneys. Symptoms include frequent or painful urination, abdominal pain, back pain, flank pain, fever/chills, nausea, or vomiting. If you were prescribed an antibiotic medicine, take it as told by your health care provider. Do not stop taking the antibiotic even if you start to feel better.    SEEK IMMEDIATE MEDICAL CARE IF YOU HAVE THE FOLLOWING SYMPTOMS: inability to hold down antibiotics or fluids, worsening pain, dizziness/lightheadedness, or change in mental status.

## 2019-08-20 NOTE — ED PROVIDER NOTE - PHYSICAL EXAMINATION
CONSTITUTIONAL: Well-appearing; well-nourished; in no apparent distress.   CARDIOVASCULAR: Normal S1, S2; no murmurs, rubs, or gallops.   RESPIRATORY: Normal chest excursion with respiration; breath sounds clear and equal bilaterally; no wheezes, rhonchi, or rales.  GI/: Normal bowel sounds; non-distended; non-tender; no palpable organomegaly. no CVA ttp  SKIN: Normal for age and race; warm; dry; good turgor; no apparent lesions or exudate.   NEURO/PSYCH: A & O x 4; grossly unremarkable. mood and manner are appropriate. Grooming and personal hygiene are appropriate. No apparent thoughts of harm to self or others.

## 2019-08-20 NOTE — ED ADULT NURSE NOTE - OBJECTIVE STATEMENT
Pt reports x1 week with burning urination, and 2 days with fever. Denies nausea, vomiting, and diarrhea. Reports suprapubic pain. Abd obese, soft and non-tender.

## 2019-08-21 RX ORDER — CEFDINIR 250 MG/5ML
1 POWDER, FOR SUSPENSION ORAL
Qty: 24 | Refills: 0
Start: 2019-08-21 | End: 2019-09-01

## 2019-08-23 ENCOUNTER — APPOINTMENT (OUTPATIENT)
Dept: GASTROENTEROLOGY | Facility: CLINIC | Age: 64
End: 2019-08-23

## 2019-09-20 ENCOUNTER — APPOINTMENT (OUTPATIENT)
Dept: INTERNAL MEDICINE | Facility: CLINIC | Age: 64
End: 2019-09-20

## 2019-11-04 NOTE — ED ADULT NURSE NOTE - NS ED NURSE LEVEL OF CONSCIOUSNESS AFFECT
Problem: Impaired Functional Mobility  Goal: Achieve highest/safest level of mobility/gait  Description  Interventions:  - Assess patient's functional ability and stability  - Promote increasing activity/tolerance for mobility and gait  - Educate and eng Cal[place]

## 2020-08-10 ENCOUNTER — EMERGENCY (EMERGENCY)
Facility: HOSPITAL | Age: 65
LOS: 0 days | Discharge: HOME | End: 2020-08-10
Attending: EMERGENCY MEDICINE | Admitting: EMERGENCY MEDICINE
Payer: MEDICAID

## 2020-08-10 VITALS
SYSTOLIC BLOOD PRESSURE: 181 MMHG | RESPIRATION RATE: 17 BRPM | WEIGHT: 160.06 LBS | HEART RATE: 83 BPM | DIASTOLIC BLOOD PRESSURE: 78 MMHG | OXYGEN SATURATION: 99 % | TEMPERATURE: 98 F

## 2020-08-10 LAB
ALBUMIN SERPL ELPH-MCNC: 4.5 G/DL — SIGNIFICANT CHANGE UP (ref 3.5–5.2)
ALP SERPL-CCNC: 106 U/L — SIGNIFICANT CHANGE UP (ref 30–115)
ALT FLD-CCNC: 18 U/L — SIGNIFICANT CHANGE UP (ref 0–41)
ANION GAP SERPL CALC-SCNC: 9 MMOL/L — SIGNIFICANT CHANGE UP (ref 7–14)
AST SERPL-CCNC: 18 U/L — SIGNIFICANT CHANGE UP (ref 0–41)
BASOPHILS # BLD AUTO: 0.01 K/UL — SIGNIFICANT CHANGE UP (ref 0–0.2)
BASOPHILS NFR BLD AUTO: 0.2 % — SIGNIFICANT CHANGE UP (ref 0–1)
BILIRUB SERPL-MCNC: 0.2 MG/DL — SIGNIFICANT CHANGE UP (ref 0.2–1.2)
BUN SERPL-MCNC: 18 MG/DL — SIGNIFICANT CHANGE UP (ref 10–20)
CALCIUM SERPL-MCNC: 9.4 MG/DL — SIGNIFICANT CHANGE UP (ref 8.5–10.1)
CHLORIDE SERPL-SCNC: 103 MMOL/L — SIGNIFICANT CHANGE UP (ref 98–110)
CO2 SERPL-SCNC: 26 MMOL/L — SIGNIFICANT CHANGE UP (ref 17–32)
CREAT SERPL-MCNC: 0.6 MG/DL — LOW (ref 0.7–1.5)
EOSINOPHIL # BLD AUTO: 0.13 K/UL — SIGNIFICANT CHANGE UP (ref 0–0.7)
EOSINOPHIL NFR BLD AUTO: 2.1 % — SIGNIFICANT CHANGE UP (ref 0–8)
GLUCOSE SERPL-MCNC: 132 MG/DL — HIGH (ref 70–99)
HCT VFR BLD CALC: 40.1 % — SIGNIFICANT CHANGE UP (ref 37–47)
HGB BLD-MCNC: 13.3 G/DL — SIGNIFICANT CHANGE UP (ref 12–16)
IMM GRANULOCYTES NFR BLD AUTO: 0.5 % — HIGH (ref 0.1–0.3)
LYMPHOCYTES # BLD AUTO: 1.89 K/UL — SIGNIFICANT CHANGE UP (ref 1.2–3.4)
LYMPHOCYTES # BLD AUTO: 30.4 % — SIGNIFICANT CHANGE UP (ref 20.5–51.1)
MCHC RBC-ENTMCNC: 30.4 PG — SIGNIFICANT CHANGE UP (ref 27–31)
MCHC RBC-ENTMCNC: 33.2 G/DL — SIGNIFICANT CHANGE UP (ref 32–37)
MCV RBC AUTO: 91.8 FL — SIGNIFICANT CHANGE UP (ref 81–99)
MONOCYTES # BLD AUTO: 0.55 K/UL — SIGNIFICANT CHANGE UP (ref 0.1–0.6)
MONOCYTES NFR BLD AUTO: 8.8 % — SIGNIFICANT CHANGE UP (ref 1.7–9.3)
NEUTROPHILS # BLD AUTO: 3.61 K/UL — SIGNIFICANT CHANGE UP (ref 1.4–6.5)
NEUTROPHILS NFR BLD AUTO: 58 % — SIGNIFICANT CHANGE UP (ref 42.2–75.2)
NRBC # BLD: 0 /100 WBCS — SIGNIFICANT CHANGE UP (ref 0–0)
PLATELET # BLD AUTO: 221 K/UL — SIGNIFICANT CHANGE UP (ref 130–400)
POTASSIUM SERPL-MCNC: 4 MMOL/L — SIGNIFICANT CHANGE UP (ref 3.5–5)
POTASSIUM SERPL-SCNC: 4 MMOL/L — SIGNIFICANT CHANGE UP (ref 3.5–5)
PROT SERPL-MCNC: 7.3 G/DL — SIGNIFICANT CHANGE UP (ref 6–8)
RBC # BLD: 4.37 M/UL — SIGNIFICANT CHANGE UP (ref 4.2–5.4)
RBC # FLD: 11.6 % — SIGNIFICANT CHANGE UP (ref 11.5–14.5)
SODIUM SERPL-SCNC: 138 MMOL/L — SIGNIFICANT CHANGE UP (ref 135–146)
WBC # BLD: 6.22 K/UL — SIGNIFICANT CHANGE UP (ref 4.8–10.8)
WBC # FLD AUTO: 6.22 K/UL — SIGNIFICANT CHANGE UP (ref 4.8–10.8)

## 2020-08-10 PROCEDURE — 99284 EMERGENCY DEPT VISIT MOD MDM: CPT

## 2020-08-10 RX ORDER — ACETAZOLAMIDE 250 MG/1
1 TABLET ORAL
Qty: 10 | Refills: 0
Start: 2020-08-10 | End: 2020-08-14

## 2020-08-10 RX ORDER — DORZOLAMIDE HYDROCHLORIDE TIMOLOL MALEATE 20; 5 MG/ML; MG/ML
1 SOLUTION/ DROPS OPHTHALMIC ONCE
Refills: 0 | Status: DISCONTINUED | OUTPATIENT
Start: 2020-08-10 | End: 2020-08-10

## 2020-08-10 RX ORDER — ONDANSETRON 8 MG/1
4 TABLET, FILM COATED ORAL ONCE
Refills: 0 | Status: COMPLETED | OUTPATIENT
Start: 2020-08-10 | End: 2020-08-10

## 2020-08-10 RX ORDER — IBUPROFEN 200 MG
600 TABLET ORAL ONCE
Refills: 0 | Status: COMPLETED | OUTPATIENT
Start: 2020-08-10 | End: 2020-08-10

## 2020-08-10 RX ORDER — LATANOPROST 0.05 MG/ML
1 SOLUTION/ DROPS OPHTHALMIC; TOPICAL ONCE
Refills: 0 | Status: COMPLETED | OUTPATIENT
Start: 2020-08-10 | End: 2020-08-10

## 2020-08-10 RX ORDER — ACETAZOLAMIDE 250 MG/1
500 TABLET ORAL ONCE
Refills: 0 | Status: COMPLETED | OUTPATIENT
Start: 2020-08-10 | End: 2020-08-10

## 2020-08-10 RX ORDER — DORZOLAMIDE HYDROCHLORIDE 20 MG/ML
1 SOLUTION/ DROPS OPHTHALMIC ONCE
Refills: 0 | Status: COMPLETED | OUTPATIENT
Start: 2020-08-10 | End: 2020-08-10

## 2020-08-10 RX ORDER — TIMOLOL 0.5 %
1 DROPS OPHTHALMIC (EYE) ONCE
Refills: 0 | Status: COMPLETED | OUTPATIENT
Start: 2020-08-10 | End: 2020-08-10

## 2020-08-10 RX ORDER — BRIMONIDINE TARTRATE 2 MG/MG
1 SOLUTION/ DROPS OPHTHALMIC ONCE
Refills: 0 | Status: COMPLETED | OUTPATIENT
Start: 2020-08-10 | End: 2020-08-10

## 2020-08-10 RX ADMIN — Medication 1 DROP(S): at 02:46

## 2020-08-10 RX ADMIN — BRIMONIDINE TARTRATE 1 DROP(S): 2 SOLUTION/ DROPS OPHTHALMIC at 02:46

## 2020-08-10 RX ADMIN — ACETAZOLAMIDE 500 MILLIGRAM(S): 250 TABLET ORAL at 03:12

## 2020-08-10 RX ADMIN — DORZOLAMIDE HYDROCHLORIDE 1 DROP(S): 20 SOLUTION/ DROPS OPHTHALMIC at 02:46

## 2020-08-10 RX ADMIN — Medication 600 MILLIGRAM(S): at 05:57

## 2020-08-10 RX ADMIN — LATANOPROST 1 DROP(S): 0.05 SOLUTION/ DROPS OPHTHALMIC; TOPICAL at 02:46

## 2020-08-10 NOTE — ED PROVIDER NOTE - NS ED ROS FT
GEN:  no fever, no chills  NEURO:  +headache, no dizziness  EYES: + vision change  ENT: no sore throat, no runny nose  CV:  no chest pain, no palpitations  RESP:  no sob, no cough  GI:  + nausea, no vomiting, no abdominal pain  :  no dysuria  MSK:  no joint pain, no edema  SKIN:  no rash, no bruising

## 2020-08-10 NOTE — ED PROVIDER NOTE - PROGRESS NOTE DETAILS
TC: Kenny Matthews ([phone]) TC: Called ophtho Dr. Matthews ([phone]) who recommended 1 rounds of brimonidine, xalatan, cosopt. Then recheck pressures in 15 min and then call back. Requested BMP. TC: 64 yo F with no PMHx who presents with acute onset R posterior headache with R "dark" vision. Here in ED, vitals wnl. R occular pressure 70mmHg with sluggishly reactive pupil and decreased visual acuity 20/40 (compared to L occular pressure 11mmHg, briskly reactive, visual acuity 20/25). No other focal neuro deficits. Called ophtho Dr. Matthews ([phone]) who recommended 1 round of brimonidine, xalatan, cosopt. Then recheck pressures in 15 min and then call back. Requested BMP. On her way from [place] to see pt. TC: Ophtho at bedside. Pt received 1st round of drops, repeat pressure 78mmHg in R eye. Recommended diamox 500mg now. Ophtho resident is taking patient to ophtho clinic for further evaluation. Will give a dose of Motrin and Zofran. TC: Pt back from ophtho clinic with ophtho resident. Labs wnl including normal BUN/Cr. Given motrin, zofran for headache. TC: Pt back from ophtho clinic with ophtho resident. Labs wnl including normal BUN/Cr. Given motrin, zofran for headache. R ocular pressure now 18mmHg. Rx for diamox sent to pharmacy. Pt provided with eye drops and instructed to f/u with ophtho clinic later today or tomorrow. Strict ED return precautions given. Pt verbalized understanding and was agreeable with plan.

## 2020-08-10 NOTE — ED PROVIDER NOTE - NSFOLLOWUPINSTRUCTIONS_ED_ALL_ED_FT
Glaucoma    LO QUE NECESITA SABER:    Glaucoma es johnnie enfermedad de los ojos que causa la pérdida de la vista en cliff o ambos ojos. El glaucoma es causado por la acumulación de fluido detrás del natan. Angella líquido pone presión en el nervio óptico y lo daña. Por lo general el glaucoma se desarrolla lentamente.    INSTRUCCIONES SOBRE EL [facility]ITAL[facility]:    Medicamentos:    Los medicamentos para la presión ocularayudan a bajar la presión en los ojos. Es posible que también reduzcan la cantidad de líquido que christi ojos producen o que ayuden a christi ojos a drenar mejor el líquido.    [facility] christi medicamentos david se le haya indicado.Consulte con pfeiffer médico si usted pauline que pfeiffer medicamento no le está ayudando o si presenta efectos secundarios. Infórmele si es alérgico a algún medicamento. Mantenga johnnie lista actualizada de los medicamentos, las vitaminas y los productos herbales que rj. Incluya los siguientes datos de los medicamentos: cantidad, frecuencia y motivo de administración. Traiga con usted la lista o los envases de las píldoras a christi citas de seguimiento. Lleve la lista de los medicamentos con usted en angel de johnnie emergencia.    Programe johnnie stephie con pfeiffer médico u oftalmólogo según indicaciones:Es posible que deba regresar cada 3 o 6 meses para que le controlen la presión ocular. Anote christi preguntas para que se acuerde de hacerlas caty christi visitas.    Evite comportamientos que aumentan la presión en los ojos:Trate de no hacer fuerza cuando evacue el intestino. No se ponga ropa apretada alrededor del ana o el pecho. No empuje ni levante objetos que pesen más de 5 libras. Evite las actividades extenuantes.    Evite estar con personas enfermas.La presión en los ojos aumenta al estornudar o toser.    Identificación de alerta médica:Lleve puesto prendas de alerta médica o lleve consigo johnnie tarjeta que indique que usted tiene glaucoma. Pregúntele a pfeiffer médico dónde conseguir estos artículos.Accesorios de alerta médica     Comuníquese con pfeiffer médico u oftalmólogo si:    - Christi síntomas empeoran, aún después del tratamiento.  - El medicamento hace que los ojos le ardan o se le pongan rojos.  - Usted tiene preguntas o inquietudes acerca de pfeiffer condición o cuidado.    Regrese a la walter de emergencias si:    - Usted pierde la vista de forma repentina.  - Usted tiene la vista borrosa y dolor de roverto muy intenso.  - Usted siente mucho dolor en los ojos y nota cambios en pfeiffer vista.  - Usted tiene náuseas y vómitos.    © Copyright [employer] 2020

## 2020-08-10 NOTE — ED PROVIDER NOTE - PHYSICAL EXAMINATION
CONSTITUTIONAL: well developed, well nourished, in no acute distress, speaking in full sentences, nontoxic appearing  SKIN: warm, dry, no rash  HEAD: normocephalic, atraumatic  EYES: R pupil 3mm sluggishly reactive, L pupil 3mm and briskly reactive, EOMI, + R scleral injection, R ocular pressure 70mmHg, L occular pressure 11mmHg, R visual acuity 20/40, L visual acuity 20/25  ENT: patent airway, moist mucous membranes, no tongue deviation  NECK: supple, no masses  CV:  regular rate, regular rhythm, 2+ radial pulses bilaterally  RESP: no wheezes, no rales, no rhonchi, normal work of breathing  ABD: soft, nontender, nondistended, no rebound, no guarding  MSK: normal ROM, no cyanosis, no edema  NEURO: alert, oriented, CN 2-12 grossly intact, sensation intact to light touch symmetrically, 5/5 motor strength in all extremities,no pronator drift, no facial asymmetry, normal gait  PSYCH: cooperative, appropriate

## 2020-08-10 NOTE — ED PROVIDER NOTE - CLINICAL SUMMARY MEDICAL DECISION MAKING FREE TEXT BOX
64 yo female with acute glaucoma, seen by ophtho, ocular pressure reduced with meds, follow up with ophtho today or tomorrow.

## 2020-08-10 NOTE — ED PROVIDER NOTE - ATTENDING CONTRIBUTION TO CARE
66 yo female c/o rt sided headache and dim vision in her rt eye.  No N/V, no paresthesias or focal weakness, no eye drainage or trauma, no contact lens use.  no similar symptoms in the past  took Tylenol at home without relief.  Well-appearing female, NAD,  mild rt conjunctival injection, slightly sluggish rt pupil, supple neck without meningeal signs, no focal neuro deficits, nml work of breathing, lungs CTA b/l, RRR, well-perfused extremities.  Rt eye pressure is 70, lt 11.  Finding suspicious glaucoma.  Will get ophtho consult.

## 2020-08-10 NOTE — ED PROVIDER NOTE - PATIENT PORTAL LINK FT
You can access the FollowMyHealth Patient Portal offered by [facility] by registering at the following website: [email or web address]. By joining [employer]’s FollowMyHealth portal, you will also be able to view your health information using other applications (apps) compatible with our system.

## 2020-08-10 NOTE — ED PROVIDER NOTE - OBJECTIVE STATEMENT
66 yo F with no PMHx who presents with acute onset of constant, moderate, R occipital headache radiating behind R eye which started 2 hrs ago associated with "darkness" in R eye and nausea. Pain not alleviated with tylenol, not worse with bright lights/going from dark to light. No vomiting, blindness, unilateral weakness, numbness, difficulty walking, dizziness. No hx of similar sx. Wears reading glasses occasionally. States her PMD told her last yr that she may develop glaucoma but was not rx'ed any  meds or drops.

## 2020-08-10 NOTE — ED PROVIDER NOTE - NSFOLLOWUPCLINICS_GEN_ALL_ED_FT
[facility]  Ophthalmolgy  [address], [address]  [place] [address]  Phone: [phone]  Fax:   Follow Up Time: 1-3 Days

## 2020-08-10 NOTE — ED ADULT NURSE NOTE - OBJECTIVE STATEMENT
patient complaining of headache 1 hr ago. headache started form back of her head to right side of the head, warmness on her right eye as well.   patient AxO x3, Hypertensive.

## 2020-08-14 DIAGNOSIS — R51 HEADACHE: ICD-10-CM

## 2020-08-14 DIAGNOSIS — H40.211 ACUTE ANGLE-CLOSURE GLAUCOMA, RIGHT EYE: ICD-10-CM

## 2020-08-14 DIAGNOSIS — R11.0 NAUSEA: ICD-10-CM

## 2020-08-14 DIAGNOSIS — F17.200 NICOTINE DEPENDENCE, UNSPECIFIED, UNCOMPLICATED: ICD-10-CM

## 2020-09-15 ENCOUNTER — APPOINTMENT (OUTPATIENT)
Dept: INTERNAL MEDICINE | Facility: CLINIC | Age: 65
End: 2020-09-15

## 2020-10-19 ENCOUNTER — EMERGENCY (EMERGENCY)
Facility: HOSPITAL | Age: 65
LOS: 0 days | Discharge: HOME | End: 2020-10-19
Attending: EMERGENCY MEDICINE | Admitting: EMERGENCY MEDICINE
Payer: MEDICAID

## 2020-10-19 VITALS
TEMPERATURE: 99 F | WEIGHT: 155.65 LBS | RESPIRATION RATE: 18 BRPM | HEART RATE: 102 BPM | OXYGEN SATURATION: 98 % | DIASTOLIC BLOOD PRESSURE: 81 MMHG | SYSTOLIC BLOOD PRESSURE: 126 MMHG

## 2020-10-19 VITALS
HEART RATE: 94 BPM | TEMPERATURE: 98 F | SYSTOLIC BLOOD PRESSURE: 168 MMHG | DIASTOLIC BLOOD PRESSURE: 72 MMHG | OXYGEN SATURATION: 99 % | RESPIRATION RATE: 18 BRPM

## 2020-10-19 DIAGNOSIS — Z90.49 ACQUIRED ABSENCE OF OTHER SPECIFIED PARTS OF DIGESTIVE TRACT: ICD-10-CM

## 2020-10-19 DIAGNOSIS — R10.9 UNSPECIFIED ABDOMINAL PAIN: ICD-10-CM

## 2020-10-19 DIAGNOSIS — N12 TUBULO-INTERSTITIAL NEPHRITIS, NOT SPECIFIED AS ACUTE OR CHRONIC: ICD-10-CM

## 2020-10-19 LAB
ALBUMIN SERPL ELPH-MCNC: 4 G/DL — SIGNIFICANT CHANGE UP (ref 3.5–5.2)
ALP SERPL-CCNC: 118 U/L — HIGH (ref 30–115)
ALT FLD-CCNC: 74 U/L — HIGH (ref 0–41)
ANION GAP SERPL CALC-SCNC: 9 MMOL/L — SIGNIFICANT CHANGE UP (ref 7–14)
APPEARANCE UR: CLEAR — SIGNIFICANT CHANGE UP
AST SERPL-CCNC: 62 U/L — HIGH (ref 0–41)
BACTERIA # UR AUTO: NEGATIVE — SIGNIFICANT CHANGE UP
BASOPHILS # BLD AUTO: 0.01 K/UL — SIGNIFICANT CHANGE UP (ref 0–0.2)
BASOPHILS NFR BLD AUTO: 0.1 % — SIGNIFICANT CHANGE UP (ref 0–1)
BILIRUB SERPL-MCNC: 0.7 MG/DL — SIGNIFICANT CHANGE UP (ref 0.2–1.2)
BILIRUB UR-MCNC: NEGATIVE — SIGNIFICANT CHANGE UP
BUN SERPL-MCNC: 15 MG/DL — SIGNIFICANT CHANGE UP (ref 10–20)
CALCIUM SERPL-MCNC: 8.5 MG/DL — SIGNIFICANT CHANGE UP (ref 8.5–10.1)
CHLORIDE SERPL-SCNC: 107 MMOL/L — SIGNIFICANT CHANGE UP (ref 98–110)
CO2 SERPL-SCNC: 23 MMOL/L — SIGNIFICANT CHANGE UP (ref 17–32)
COLOR SPEC: COLORLESS — SIGNIFICANT CHANGE UP
CREAT SERPL-MCNC: 0.7 MG/DL — SIGNIFICANT CHANGE UP (ref 0.7–1.5)
DIFF PNL FLD: ABNORMAL
EOSINOPHIL # BLD AUTO: 0.07 K/UL — SIGNIFICANT CHANGE UP (ref 0–0.7)
EOSINOPHIL NFR BLD AUTO: 0.9 % — SIGNIFICANT CHANGE UP (ref 0–8)
EPI CELLS # UR: 1 /HPF — SIGNIFICANT CHANGE UP (ref 0–5)
GLUCOSE SERPL-MCNC: 116 MG/DL — HIGH (ref 70–99)
GLUCOSE UR QL: NEGATIVE — SIGNIFICANT CHANGE UP
HCT VFR BLD CALC: 37.8 % — SIGNIFICANT CHANGE UP (ref 37–47)
HGB BLD-MCNC: 12.6 G/DL — SIGNIFICANT CHANGE UP (ref 12–16)
HYALINE CASTS # UR AUTO: 3 /LPF — SIGNIFICANT CHANGE UP (ref 0–7)
IMM GRANULOCYTES NFR BLD AUTO: 0.4 % — HIGH (ref 0.1–0.3)
KETONES UR-MCNC: NEGATIVE — SIGNIFICANT CHANGE UP
LACTATE SERPL-SCNC: 0.8 MMOL/L — SIGNIFICANT CHANGE UP (ref 0.7–2)
LEUKOCYTE ESTERASE UR-ACNC: ABNORMAL
LIDOCAIN IGE QN: 12 U/L — SIGNIFICANT CHANGE UP (ref 7–60)
LYMPHOCYTES # BLD AUTO: 1.08 K/UL — LOW (ref 1.2–3.4)
LYMPHOCYTES # BLD AUTO: 13.6 % — LOW (ref 20.5–51.1)
MCHC RBC-ENTMCNC: 29.9 PG — SIGNIFICANT CHANGE UP (ref 27–31)
MCHC RBC-ENTMCNC: 33.3 G/DL — SIGNIFICANT CHANGE UP (ref 32–37)
MCV RBC AUTO: 89.6 FL — SIGNIFICANT CHANGE UP (ref 81–99)
MONOCYTES # BLD AUTO: 0.92 K/UL — HIGH (ref 0.1–0.6)
MONOCYTES NFR BLD AUTO: 11.6 % — HIGH (ref 1.7–9.3)
NEUTROPHILS # BLD AUTO: 5.82 K/UL — SIGNIFICANT CHANGE UP (ref 1.4–6.5)
NEUTROPHILS NFR BLD AUTO: 73.4 % — SIGNIFICANT CHANGE UP (ref 42.2–75.2)
NITRITE UR-MCNC: NEGATIVE — SIGNIFICANT CHANGE UP
NRBC # BLD: 0 /100 WBCS — SIGNIFICANT CHANGE UP (ref 0–0)
PH UR: 7 — SIGNIFICANT CHANGE UP (ref 5–8)
PLATELET # BLD AUTO: 186 K/UL — SIGNIFICANT CHANGE UP (ref 130–400)
POTASSIUM SERPL-MCNC: 4.3 MMOL/L — SIGNIFICANT CHANGE UP (ref 3.5–5)
POTASSIUM SERPL-SCNC: 4.3 MMOL/L — SIGNIFICANT CHANGE UP (ref 3.5–5)
PROT SERPL-MCNC: 6.4 G/DL — SIGNIFICANT CHANGE UP (ref 6–8)
PROT UR-MCNC: NEGATIVE — SIGNIFICANT CHANGE UP
RBC # BLD: 4.22 M/UL — SIGNIFICANT CHANGE UP (ref 4.2–5.4)
RBC # FLD: 11.6 % — SIGNIFICANT CHANGE UP (ref 11.5–14.5)
RBC CASTS # UR COMP ASSIST: 4 /HPF — SIGNIFICANT CHANGE UP (ref 0–4)
SODIUM SERPL-SCNC: 139 MMOL/L — SIGNIFICANT CHANGE UP (ref 135–146)
SP GR SPEC: 1.01 — LOW (ref 1.01–1.03)
UROBILINOGEN FLD QL: SIGNIFICANT CHANGE UP
WBC # BLD: 7.93 K/UL — SIGNIFICANT CHANGE UP (ref 4.8–10.8)
WBC # FLD AUTO: 7.93 K/UL — SIGNIFICANT CHANGE UP (ref 4.8–10.8)
WBC UR QL: 46 /HPF — HIGH (ref 0–5)

## 2020-10-19 PROCEDURE — 99285 EMERGENCY DEPT VISIT HI MDM: CPT

## 2020-10-19 PROCEDURE — 74177 CT ABD & PELVIS W/CONTRAST: CPT | Mod: 26

## 2020-10-19 RX ORDER — CEFPODOXIME PROXETIL 100 MG
1 TABLET ORAL
Qty: 20 | Refills: 0
Start: 2020-10-19 | End: 2020-10-28

## 2020-10-19 RX ORDER — SODIUM CHLORIDE 9 MG/ML
1000 INJECTION INTRAMUSCULAR; INTRAVENOUS; SUBCUTANEOUS ONCE
Refills: 0 | Status: COMPLETED | OUTPATIENT
Start: 2020-10-19 | End: 2020-10-19

## 2020-10-19 RX ORDER — CEFTRIAXONE 500 MG/1
1000 INJECTION, POWDER, FOR SOLUTION INTRAMUSCULAR; INTRAVENOUS ONCE
Refills: 0 | Status: COMPLETED | OUTPATIENT
Start: 2020-10-19 | End: 2020-10-19

## 2020-10-19 RX ADMIN — CEFTRIAXONE 100 MILLIGRAM(S): 500 INJECTION, POWDER, FOR SOLUTION INTRAMUSCULAR; INTRAVENOUS at 18:45

## 2020-10-19 RX ADMIN — SODIUM CHLORIDE 1000 MILLILITER(S): 9 INJECTION INTRAMUSCULAR; INTRAVENOUS; SUBCUTANEOUS at 15:09

## 2020-10-19 NOTE — ED ADULT NURSE NOTE - OBJECTIVE STATEMENT
c/o right sided flank pain x 1 day and urinary frequency x 1 week. Also c/o fever and chills, denies any nausea or vomiting

## 2020-10-19 NOTE — ED PROVIDER NOTE - CLINICAL SUMMARY MEDICAL DECISION MAKING FREE TEXT BOX
pt here with right sided flank pain. ct abd pelvis showing pyelo, patient well apppearing, given iv dose of abx, dc home on abx. outpt f/u advised

## 2020-10-19 NOTE — ED PROVIDER NOTE - PROGRESS NOTE DETAILS
Attending Note: 66 y/o F with PMH of kidney stone requiring lithotripsy, presents to ED c/o right-sided flank pain associated with increased urinary frequency and foul smell x1 week. Pt additionally reports fever and chills x3 days. Denies n/v/d.  PE: (+) right CVA tenderness.  Impression: Right flank pain with urinary sx and fever.  Plan: Will obtain labs, UA, urine culture and CT to r/o kidney stone. Spoke with patient at bedside, diagnosis of pyelo - aware of return precautions for worsening pain, intractable nausea and vomiting and inability to take the PO ABx that we louise send to her pharmacy. She will f/u with PMD this week, denies any discomfort at this time. We will give her a dose of ceftrixone now and send Vantin to her pharmacy for continued treatment. pt well appearing, no fever, chills, rigors, vomitting. will dc on vantin, iv dose in ed prior to dc. pt agreeable with plan. return precautions given to pt

## 2020-10-19 NOTE — ED PROVIDER NOTE - PATIENT PORTAL LINK FT
You can access the FollowMyHealth Patient Portal offered by [facility] by registering at the following website: [email or web address]. By joining [employer]’s FollowMyHealth portal, you will also be able to view your health information using other applications (apps) compatible with our system.

## 2020-10-19 NOTE — ED PROVIDER NOTE - OBJECTIVE STATEMENT
64 y/o F with hx of kidney stone 8 years ago s/p lithotripsy?, cholecystectomy p.w right sided flank pain and urinary frequency and foul smell x 1 week. Flank pain x 1 day associated with fever, chills x 3 days, took Tylenol 2 tabs 2 hours ago, denies nausea/vomiting/diarrhea, no other complaints.

## 2020-10-19 NOTE — ED PROVIDER NOTE - NS ED ROS FT
Constitutional: See HPI.  Eyes: No visual changes, eye pain or discharge. No Photophobia  ENMT: No hearing changes, pain, discharge or infections. No neck pain or stiffness. No limited ROM  Cardiac: No SOB or edema. No chest pain with exertion.  Respiratory: No cough or respiratory distress.  GI: see hpi   : see hpi   MS: No myalgia, muscle weakness, joint pain or back pain.  Neuro: No headache or weakness. No LOC.  Skin: No skin rash.  Except as documented in the HPI, all other systems are negative.

## 2020-10-20 LAB
CULTURE RESULTS: SIGNIFICANT CHANGE UP
SPECIMEN SOURCE: SIGNIFICANT CHANGE UP

## 2020-10-28 ENCOUNTER — INPATIENT (INPATIENT)
Facility: HOSPITAL | Age: 65
LOS: 5 days | Discharge: HOME | End: 2020-11-03
Attending: UROLOGY | Admitting: UROLOGY
Payer: MEDICAID

## 2020-10-28 VITALS
RESPIRATION RATE: 20 BRPM | DIASTOLIC BLOOD PRESSURE: 69 MMHG | HEART RATE: 120 BPM | OXYGEN SATURATION: 98 % | SYSTOLIC BLOOD PRESSURE: 138 MMHG | TEMPERATURE: 103 F | WEIGHT: 154.32 LBS

## 2020-10-28 DIAGNOSIS — Z90.49 ACQUIRED ABSENCE OF OTHER SPECIFIED PARTS OF DIGESTIVE TRACT: Chronic | ICD-10-CM

## 2020-10-28 DIAGNOSIS — Z90.710 ACQUIRED ABSENCE OF BOTH CERVIX AND UTERUS: Chronic | ICD-10-CM

## 2020-10-28 LAB
ALBUMIN SERPL ELPH-MCNC: 4 G/DL — SIGNIFICANT CHANGE UP (ref 3.5–5.2)
ALP SERPL-CCNC: 213 U/L — HIGH (ref 30–115)
ALT FLD-CCNC: 225 U/L — HIGH (ref 0–41)
ANION GAP SERPL CALC-SCNC: 15 MMOL/L — HIGH (ref 7–14)
APPEARANCE UR: ABNORMAL
AST SERPL-CCNC: 215 U/L — HIGH (ref 0–41)
BACTERIA # UR AUTO: NEGATIVE — SIGNIFICANT CHANGE UP
BASE EXCESS BLDV CALC-SCNC: 1.2 MMOL/L — SIGNIFICANT CHANGE UP (ref -2–2)
BASOPHILS # BLD AUTO: 0 K/UL — SIGNIFICANT CHANGE UP (ref 0–0.2)
BASOPHILS NFR BLD AUTO: 0 % — SIGNIFICANT CHANGE UP (ref 0–1)
BILIRUB SERPL-MCNC: 1.3 MG/DL — HIGH (ref 0.2–1.2)
BILIRUB UR-MCNC: NEGATIVE — SIGNIFICANT CHANGE UP
BUN SERPL-MCNC: 24 MG/DL — HIGH (ref 10–20)
CA-I SERPL-SCNC: 1.09 MMOL/L — LOW (ref 1.12–1.3)
CALCIUM SERPL-MCNC: 8.5 MG/DL — SIGNIFICANT CHANGE UP (ref 8.5–10.1)
CHLORIDE SERPL-SCNC: 103 MMOL/L — SIGNIFICANT CHANGE UP (ref 98–110)
CO2 SERPL-SCNC: 20 MMOL/L — SIGNIFICANT CHANGE UP (ref 17–32)
COLOR SPEC: YELLOW — SIGNIFICANT CHANGE UP
CREAT SERPL-MCNC: 1.4 MG/DL — SIGNIFICANT CHANGE UP (ref 0.7–1.5)
DIFF PNL FLD: ABNORMAL
EOSINOPHIL # BLD AUTO: 0 K/UL — SIGNIFICANT CHANGE UP (ref 0–0.7)
EOSINOPHIL NFR BLD AUTO: 0 % — SIGNIFICANT CHANGE UP (ref 0–8)
EPI CELLS # UR: 1 /HPF — SIGNIFICANT CHANGE UP (ref 0–5)
GAS PNL BLDV: 138 MMOL/L — SIGNIFICANT CHANGE UP (ref 136–145)
GAS PNL BLDV: SIGNIFICANT CHANGE UP
GIANT PLATELETS BLD QL SMEAR: PRESENT — SIGNIFICANT CHANGE UP
GLUCOSE SERPL-MCNC: 138 MG/DL — HIGH (ref 70–99)
GLUCOSE UR QL: NEGATIVE — SIGNIFICANT CHANGE UP
HCO3 BLDV-SCNC: 26 MMOL/L — SIGNIFICANT CHANGE UP (ref 22–29)
HCT VFR BLD CALC: 36.6 % — LOW (ref 37–47)
HCT VFR BLDA CALC: 35.1 % — SIGNIFICANT CHANGE UP (ref 34–44)
HGB BLD CALC-MCNC: 11.5 G/DL — LOW (ref 14–18)
HGB BLD-MCNC: 12 G/DL — SIGNIFICANT CHANGE UP (ref 12–16)
HYALINE CASTS # UR AUTO: 4 /LPF — SIGNIFICANT CHANGE UP (ref 0–7)
KETONES UR-MCNC: NEGATIVE — SIGNIFICANT CHANGE UP
LACTATE BLDV-MCNC: 1.7 MMOL/L — HIGH (ref 0.5–1.6)
LACTATE SERPL-SCNC: 3.2 MMOL/L — HIGH (ref 0.7–2)
LEUKOCYTE ESTERASE UR-ACNC: ABNORMAL
LIDOCAIN IGE QN: 14 U/L — SIGNIFICANT CHANGE UP (ref 7–60)
LYMPHOCYTES # BLD AUTO: 0.22 K/UL — LOW (ref 1.2–3.4)
LYMPHOCYTES # BLD AUTO: 1.7 % — LOW (ref 20.5–51.1)
MANUAL SMEAR VERIFICATION: SIGNIFICANT CHANGE UP
MCHC RBC-ENTMCNC: 30 PG — SIGNIFICANT CHANGE UP (ref 27–31)
MCHC RBC-ENTMCNC: 32.8 G/DL — SIGNIFICANT CHANGE UP (ref 32–37)
MCV RBC AUTO: 91.5 FL — SIGNIFICANT CHANGE UP (ref 81–99)
MONOCYTES # BLD AUTO: 0.11 K/UL — SIGNIFICANT CHANGE UP (ref 0.1–0.6)
MONOCYTES NFR BLD AUTO: 0.9 % — LOW (ref 1.7–9.3)
NEUTROPHILS # BLD AUTO: 12.42 K/UL — HIGH (ref 1.4–6.5)
NEUTROPHILS NFR BLD AUTO: 95.7 % — HIGH (ref 42.2–75.2)
NEUTS BAND # BLD: 1.7 % — SIGNIFICANT CHANGE UP (ref 0–6)
NITRITE UR-MCNC: POSITIVE
PCO2 BLDV: 41 MMHG — SIGNIFICANT CHANGE UP (ref 41–51)
PH BLDV: 7.41 — SIGNIFICANT CHANGE UP (ref 7.26–7.43)
PH UR: 6.5 — SIGNIFICANT CHANGE UP (ref 5–8)
PLAT MORPH BLD: NORMAL — SIGNIFICANT CHANGE UP
PLATELET # BLD AUTO: 249 K/UL — SIGNIFICANT CHANGE UP (ref 130–400)
PO2 BLDV: 30 MMHG — SIGNIFICANT CHANGE UP (ref 20–40)
POTASSIUM BLDV-SCNC: 3.7 MMOL/L — SIGNIFICANT CHANGE UP (ref 3.3–5.6)
POTASSIUM SERPL-MCNC: 4.5 MMOL/L — SIGNIFICANT CHANGE UP (ref 3.5–5)
POTASSIUM SERPL-SCNC: 4.5 MMOL/L — SIGNIFICANT CHANGE UP (ref 3.5–5)
PROT SERPL-MCNC: 6.7 G/DL — SIGNIFICANT CHANGE UP (ref 6–8)
PROT UR-MCNC: SIGNIFICANT CHANGE UP
RAPID RVP RESULT: SIGNIFICANT CHANGE UP
RBC # BLD: 4 M/UL — LOW (ref 4.2–5.4)
RBC # FLD: 11.6 % — SIGNIFICANT CHANGE UP (ref 11.5–14.5)
RBC BLD AUTO: NORMAL — SIGNIFICANT CHANGE UP
RBC CASTS # UR COMP ASSIST: 2 /HPF — SIGNIFICANT CHANGE UP (ref 0–4)
SAO2 % BLDV: 62 % — SIGNIFICANT CHANGE UP
SARS-COV-2 RNA SPEC QL NAA+PROBE: SIGNIFICANT CHANGE UP
SODIUM SERPL-SCNC: 138 MMOL/L — SIGNIFICANT CHANGE UP (ref 135–146)
SP GR SPEC: 1.01 — SIGNIFICANT CHANGE UP (ref 1.01–1.03)
UROBILINOGEN FLD QL: SIGNIFICANT CHANGE UP
WBC # BLD: 12.75 K/UL — HIGH (ref 4.8–10.8)
WBC # FLD AUTO: 12.75 K/UL — HIGH (ref 4.8–10.8)
WBC UR QL: 130 /HPF — HIGH (ref 0–5)

## 2020-10-28 PROCEDURE — 52332 CYSTOSCOPY AND TREATMENT: CPT | Mod: LT

## 2020-10-28 PROCEDURE — 99223 1ST HOSP IP/OBS HIGH 75: CPT | Mod: 25

## 2020-10-28 PROCEDURE — 93010 ELECTROCARDIOGRAM REPORT: CPT

## 2020-10-28 PROCEDURE — 74177 CT ABD & PELVIS W/CONTRAST: CPT | Mod: 26

## 2020-10-28 PROCEDURE — 76705 ECHO EXAM OF ABDOMEN: CPT | Mod: 26

## 2020-10-28 PROCEDURE — 36556 INSERT NON-TUNNEL CV CATH: CPT

## 2020-10-28 PROCEDURE — 93010 ELECTROCARDIOGRAM REPORT: CPT | Mod: 77

## 2020-10-28 PROCEDURE — 99285 EMERGENCY DEPT VISIT HI MDM: CPT | Mod: 25

## 2020-10-28 RX ORDER — VANCOMYCIN HCL 1 G
1000 VIAL (EA) INTRAVENOUS EVERY 12 HOURS
Refills: 0 | Status: DISCONTINUED | OUTPATIENT
Start: 2020-10-28 | End: 2020-10-28

## 2020-10-28 RX ORDER — HYDROMORPHONE HYDROCHLORIDE 2 MG/ML
0.5 INJECTION INTRAMUSCULAR; INTRAVENOUS; SUBCUTANEOUS EVERY 4 HOURS
Refills: 0 | Status: DISCONTINUED | OUTPATIENT
Start: 2020-10-28 | End: 2020-10-29

## 2020-10-28 RX ORDER — SODIUM CHLORIDE 9 MG/ML
1000 INJECTION, SOLUTION INTRAVENOUS ONCE
Refills: 0 | Status: COMPLETED | OUTPATIENT
Start: 2020-10-28 | End: 2020-10-28

## 2020-10-28 RX ORDER — SODIUM CHLORIDE 9 MG/ML
2000 INJECTION, SOLUTION INTRAVENOUS ONCE
Refills: 0 | Status: COMPLETED | OUTPATIENT
Start: 2020-10-28 | End: 2020-10-28

## 2020-10-28 RX ORDER — OXYCODONE HYDROCHLORIDE 5 MG/1
5 TABLET ORAL EVERY 4 HOURS
Refills: 0 | Status: DISCONTINUED | OUTPATIENT
Start: 2020-10-28 | End: 2020-11-03

## 2020-10-28 RX ORDER — SODIUM CHLORIDE 9 MG/ML
1000 INJECTION, SOLUTION INTRAVENOUS
Refills: 0 | Status: DISCONTINUED | OUTPATIENT
Start: 2020-10-28 | End: 2020-10-29

## 2020-10-28 RX ORDER — CEFTRIAXONE 500 MG/1
1000 INJECTION, POWDER, FOR SOLUTION INTRAMUSCULAR; INTRAVENOUS ONCE
Refills: 0 | Status: COMPLETED | OUTPATIENT
Start: 2020-10-28 | End: 2020-10-28

## 2020-10-28 RX ORDER — NOREPINEPHRINE BITARTRATE/D5W 8 MG/250ML
0.05 PLASTIC BAG, INJECTION (ML) INTRAVENOUS
Qty: 16 | Refills: 0 | Status: DISCONTINUED | OUTPATIENT
Start: 2020-10-28 | End: 2020-10-29

## 2020-10-28 RX ORDER — ACETAMINOPHEN 500 MG
650 TABLET ORAL ONCE
Refills: 0 | Status: COMPLETED | OUTPATIENT
Start: 2020-10-28 | End: 2020-10-28

## 2020-10-28 RX ORDER — INSULIN HUMAN 100 [IU]/ML
INJECTION, SOLUTION SUBCUTANEOUS EVERY 6 HOURS
Refills: 0 | Status: DISCONTINUED | OUTPATIENT
Start: 2020-10-28 | End: 2020-10-30

## 2020-10-28 RX ORDER — PANTOPRAZOLE SODIUM 20 MG/1
40 TABLET, DELAYED RELEASE ORAL
Refills: 0 | Status: DISCONTINUED | OUTPATIENT
Start: 2020-10-28 | End: 2020-11-03

## 2020-10-28 RX ORDER — ACETAMINOPHEN 500 MG
650 TABLET ORAL EVERY 6 HOURS
Refills: 0 | Status: DISCONTINUED | OUTPATIENT
Start: 2020-10-28 | End: 2020-11-03

## 2020-10-28 RX ORDER — VANCOMYCIN HCL 1 G
1500 VIAL (EA) INTRAVENOUS ONCE
Refills: 0 | Status: COMPLETED | OUTPATIENT
Start: 2020-10-28 | End: 2020-10-28

## 2020-10-28 RX ORDER — CHLORHEXIDINE GLUCONATE 213 G/1000ML
1 SOLUTION TOPICAL
Refills: 0 | Status: DISCONTINUED | OUTPATIENT
Start: 2020-10-28 | End: 2020-11-03

## 2020-10-28 RX ORDER — CEFEPIME 1 G/1
2000 INJECTION, POWDER, FOR SOLUTION INTRAMUSCULAR; INTRAVENOUS ONCE
Refills: 0 | Status: COMPLETED | OUTPATIENT
Start: 2020-10-28 | End: 2020-10-28

## 2020-10-28 RX ORDER — NOREPINEPHRINE BITARTRATE/D5W 8 MG/250ML
0.05 PLASTIC BAG, INJECTION (ML) INTRAVENOUS
Qty: 8 | Refills: 0 | Status: DISCONTINUED | OUTPATIENT
Start: 2020-10-28 | End: 2020-10-28

## 2020-10-28 RX ORDER — MEROPENEM 1 G/30ML
1000 INJECTION INTRAVENOUS ONCE
Refills: 0 | Status: DISCONTINUED | OUTPATIENT
Start: 2020-10-28 | End: 2020-11-03

## 2020-10-28 RX ORDER — SENNA PLUS 8.6 MG/1
2 TABLET ORAL AT BEDTIME
Refills: 0 | Status: DISCONTINUED | OUTPATIENT
Start: 2020-10-28 | End: 2020-11-03

## 2020-10-28 RX ORDER — KETOROLAC TROMETHAMINE 30 MG/ML
15 SYRINGE (ML) INJECTION ONCE
Refills: 0 | Status: DISCONTINUED | OUTPATIENT
Start: 2020-10-28 | End: 2020-10-28

## 2020-10-28 RX ORDER — HEPARIN SODIUM 5000 [USP'U]/ML
5000 INJECTION INTRAVENOUS; SUBCUTANEOUS EVERY 8 HOURS
Refills: 0 | Status: DISCONTINUED | OUTPATIENT
Start: 2020-10-28 | End: 2020-11-03

## 2020-10-28 RX ORDER — FENTANYL CITRATE 50 UG/ML
25 INJECTION INTRAVENOUS ONCE
Refills: 0 | Status: DISCONTINUED | OUTPATIENT
Start: 2020-10-28 | End: 2020-10-28

## 2020-10-28 RX ORDER — ACETAMINOPHEN 500 MG
975 TABLET ORAL ONCE
Refills: 0 | Status: DISCONTINUED | OUTPATIENT
Start: 2020-10-28 | End: 2020-10-28

## 2020-10-28 RX ADMIN — Medication 6.56 MICROGRAM(S)/KG/MIN: at 20:37

## 2020-10-28 RX ADMIN — SODIUM CHLORIDE 2000 MILLILITER(S): 9 INJECTION, SOLUTION INTRAVENOUS at 11:17

## 2020-10-28 RX ADMIN — CEFTRIAXONE 100 MILLIGRAM(S): 500 INJECTION, POWDER, FOR SOLUTION INTRAMUSCULAR; INTRAVENOUS at 11:39

## 2020-10-28 RX ADMIN — CEFEPIME 100 MILLIGRAM(S): 1 INJECTION, POWDER, FOR SOLUTION INTRAMUSCULAR; INTRAVENOUS at 13:52

## 2020-10-28 RX ADMIN — SODIUM CHLORIDE 1000 MILLILITER(S): 9 INJECTION, SOLUTION INTRAVENOUS at 13:51

## 2020-10-28 RX ADMIN — SODIUM CHLORIDE 1000 MILLILITER(S): 9 INJECTION, SOLUTION INTRAVENOUS at 19:16

## 2020-10-28 RX ADMIN — SODIUM CHLORIDE 110 MILLILITER(S): 9 INJECTION, SOLUTION INTRAVENOUS at 23:44

## 2020-10-28 RX ADMIN — Medication 300 MILLIGRAM(S): at 13:52

## 2020-10-28 RX ADMIN — FENTANYL CITRATE 25 MICROGRAM(S): 50 INJECTION INTRAVENOUS at 21:29

## 2020-10-28 RX ADMIN — Medication 15 MILLIGRAM(S): at 11:17

## 2020-10-28 RX ADMIN — Medication 3.28 MICROGRAM(S)/KG/MIN: at 23:45

## 2020-10-28 RX ADMIN — Medication 650 MILLIGRAM(S): at 17:27

## 2020-10-28 NOTE — ED PROVIDER NOTE - ATTENDING CONTRIBUTION TO CARE
I personally evaluated the patient. I reviewed the Resident’s or Physician Assistant’s note (as assigned above), and agree with the findings and plan except as documented in my note.     65 female here for evaluation of left lower quadrant abdominal pain and fever with diarrhea.     Prior chart review reveals recent eval for similar had CT scan showing pyelonephritis and started on ABX.     PSH: GB removal    ROS otherwise unremarkable    GEN: female in no distress.   HEENT: non icteric conjunctiva pink. mucosa normal. EOMI PERRLA  CHEST: CTA bilateral. normal work of breathing. no accessory muscle use  NECK: normal ROM   CV: pulses intact S1S2  ABD: left lower quadrant tenderness to palpation, no rebound. soft, non rigid, no guarding noted,   EXT: FROM x 4 NVI no edema  NEURO: AAO 3 no focal deficits. Gait memory speech cognition and coordination grossly intact.   SKIN: warm to the touch, no diaphoresis  PSYCH: normal mood and mentation     Impression: abdominal pain    Plan: IV labs imaging supportive care and reevaluation

## 2020-10-28 NOTE — CHART NOTE - NSCHARTNOTEFT_GEN_A_CORE
PACU ANESTHESIA ADMISSION NOTE      Procedure: Insertion of ureteral tube      Post op diagnosis:      ____  Intubated  TV:______       Rate: ______      FiO2: ______  x  ____  Patent Airway    _x___  Full return of protective reflexes    _x___  Full recovery from anesthesia / back to baseline status    Vitals:  T(C): 38.4  HR: 93   BP: 128/57   RR: 16   SpO2: 99%  Mental Status:  _x___ Awake   _____ Alert   _____ Drowsy   _____ Sedated    Nausea/Vomiting:  __x__ NO  ______Yes,   See Post - Op Orders          Pain Scale (0-10):  _____    Treatment: ____ None    ___xx_ See Post - Op/PCA Orders    Post - Operative Fluids:   ____ Oral   _=x__ See Post - Op Orders    Plan: Discharge:   ____Home       _____Floor     __x___Critical Care    _____  Other:_________________    Comments:

## 2020-10-28 NOTE — ED PROVIDER NOTE - OBJECTIVE STATEMENT
65y F pmh pyelonephritis, kidney stones presents for eval of flank pain. Pt has known pyelonephritis and kidney stones diagnosed on 10/19 now presents with worsening moderate sharp R flank pain, no aggravating or relieving factors. Associated fever and fatigue. Denies ha, cp, sob, weakness, numbness, n/v/d/c, dysuria, hematuria

## 2020-10-28 NOTE — ED ADULT NURSE REASSESSMENT NOTE - NS ED NURSE REASSESS COMMENT FT1
Pt moved to crit, pt became hypotensive, pt will be started on levophed as per MD Beckett, pt A/Ox3, denies any complaints at this time, report given to CATE Pineda.

## 2020-10-28 NOTE — ED PROVIDER NOTE - CLINICAL SUMMARY MEDICAL DECISION MAKING FREE TEXT BOX
pt presents with back pain, found to have obstructing stone. imaging, labs, ekg,. urology and IR aware. pt admitted to SICU. pt will go to IR for stent placement.

## 2020-10-28 NOTE — PRE-OP CHECKLIST - SELECT TESTS ORDERED
Type and Screen/BMP/COVID/PT/PTT/CMP/Type and Cross/Urinalysis/CBC COVID/Urinalysis/CBC/BMP/CMP/PT/PTT/Type and Cross/Type and Screen/CXR

## 2020-10-28 NOTE — H&P ADULT - ASSESSMENT
66 yo female with pmh of kidney stones had laser lithotripsy 8 years ago, was seen here by ED on 10/19 and dx with left pyelonephritis & sent home on antibiotics, now presents to the ED with L flank & LLQ abdominal pain for the past week. CT A&P shows mild left hydroureteronephrosis extending to the level of an obstructing irregularly shaped 4.5 x 6.3 x 5.8  mm ureteral calculus at the left UVJ - pt hypotensive, mildly tachycardic, febrile, with a grossly positive UA    A) left septic UVJ stone     Plan:   ·	keep NPO, for level I cystoscopy, left ureteral stent placement, possible left percutaneous nephrostomy   ·	consult SICU, pt hemodynamic unstable    ·	f/u rapid COVID testing   ·	cont IVF, IV abx   ·	case discussed with attending 64 yo female with pmh of kidney stones had laser lithotripsy 8 years ago, was seen here by ED on 10/19 and dx with left pyelonephritis & sent home on antibiotics, now presents to the ED with L flank & LLQ abdominal pain for the past week. CT A&P shows mild left hydroureteronephrosis extending to the level of an obstructing irregularly shaped 4.5 x 6.3 x 5.8  mm ureteral calculus at the left UVJ - pt hypotensive, mildly tachycardic, febrile, with a grossly positive UA    A) left septic UVJ stone     Plan:   ·	Pt seen and examined at bedside by Dr. Salgado, ED attending Dr. Beckett translated for consent and full encounter.  ·	keep NPO, for level I cystoscopy, left ureteral stent placement, possible left percutaneous nephrostomy   ·	SICU c/s for hemodynamic monitoring   ·	f/u rapid COVID testing   ·	cont IVF, cont Meropenem/Vancomycin post-op  ·	case discussed with attending

## 2020-10-28 NOTE — ED PROVIDER NOTE - NS ED ROS FT
Constitutional: (+) fever  Eyes/ENT: (-) blurry vision, (-) epistaxis  Cardiovascular: (-) chest pain, (-) syncope  Respiratory: (-) cough, (-) shortness of breath  Gastrointestinal: (-) vomiting, (-) diarrhea  : (+) flank pain, (-) hematuria, (-) dysuria  Musculoskeletal: (-) neck pain, (-) back pain, (-) joint pain  Integumentary: (-) rash, (-) edema  Neurological: (-) headache, (-) altered mental status  Allergic/Immunologic: (-) pruritus

## 2020-10-28 NOTE — H&P ADULT - HISTORY OF PRESENT ILLNESS
Patient is a 64 yo female with past history of kidney stones had laser lithotripsy 8 years ago, was seen here by ED on 10/19 and was diagnosed with left pyelonephritis and sent home on antibiotics, now presents to the ED with L flank & LLQ abdominal pain for the past week. Pt cannot recall name of Urologist that she was seeing 8 years ago. Denies any other medical issues. Pt admits to fevers at home with vomiting x 2 episodes. Pt also admits to dysuria. Denies chills, nausea, abdominal pain, suprapubic pain, difficulty voiding or hematuria.

## 2020-10-28 NOTE — H&P ADULT - NSHPLABSRESULTS_GEN_ALL_CORE
Vital Signs Last 24 Hrs  T(C): 38.4 (28 Oct 2020 18:30), Max: 39.4 (28 Oct 2020 10:06)  T(F): 101.2 (28 Oct 2020 18:30), Max: 103 (28 Oct 2020 10:06)  HR: 89 (28 Oct 2020 20:38) (86 - 120)  BP: 128/53 (28 Oct 2020 20:38) (67/34 - 138/69)  BP(mean): 78 (28 Oct 2020 20:38) (50 - 89)  RR: 18 (28 Oct 2020 20:38) (16 - 20)  SpO2: 99% (28 Oct 2020 20:38) (96% - 99%)    LABS:                        12.0   12.75 )-----------( 249      ( 28 Oct 2020 10:38 )             36.6     10-28    138  |  103  |  24<H>  ----------------------------<  138<H>  4.5   |  20  |  1.4    Ca    8.5      28 Oct 2020 10:38    TPro  6.7  /  Alb  4.0  /  TBili  1.3<H>  /  DBili  x   /  AST  215<H>  /  ALT  225<H>  /  AlkPhos  213<H>  10-28      Urinalysis Basic - ( 28 Oct 2020 16:05 )    Color: Yellow / Appearance: Slightly Turbid / S.014 / pH: x  Gluc: x / Ketone: Negative  / Bili: Negative / Urobili: <2 mg/dL   Blood: x / Protein: Trace / Nitrite: Positive   Leuk Esterase: Large / RBC: 2 /HPF /  /HPF   Sq Epi: x / Non Sq Epi: 1 /HPF / Bacteria: Negative    IMAGING:  < from: CT Abdomen and Pelvis w/ IV Cont (10.28.20 @ 16:06) >    EXAM:  CT ABDOMEN AND PELVIS IC          PROCEDURE DATE:  10/28/2020      INTERPRETATION:  CLINICAL STATEMENT: Flank pain.    TECHNIQUE: Contiguous axial CT images were obtained from the lower chest to the pubic symphysis following administration of 100cc Omnipaque 350 intravenous contrast.  Oral contrast was not administered.  Reformatted images in the coronal and sagittal planes were acquired.    COMPARISON CT: 10/19/2020.    FINDINGS:    LOWER CHEST: Bibasilar atelectasis.    HEPATOBILIARY: Post cholecystectomy.    SPLEEN: Unremarkable.    PANCREAS: Unremarkable.    ADRENAL GLANDS: Unremarkable.    KIDNEYS: Delayed left nephrogram with mild left hydroureteronephrosis extending to the level of an obstructing irregularly shaped 4.5 x 6.3 x 5.8 mm ureteral calculus at the left UVJ (Hounsfield units 779). No right hydronephrosis.    Additional nonobstructing left renal pelvis and lower pole calyces calculi measuring up to 1.0 cm in the left interpolar calyx. Previously noted punctate nonobstructing right renal interpolar calculus is not visualized on this exam. Stable left interpolar cyst. Additional subcentimeter bilateral renal hypodensities too small to further characterize.    ABDOMINOPELVIC NODES: Unremarkable.    PELVIC ORGANS: Post hysterectomy.    PERITONEUM/MESENTERY/BOWEL: No bowel obstruction, ascites or pneumoperitoneum. Normal appendix.    BONES/SOFT TISSUES: Degenerative changes of the spine.    OTHER: Vascular calcifications.    IMPRESSION:    1. Mild left hydroureteronephrosis extending to the level of an obstructing irregularly shaped 4.5 x 6.3 x 5.8  mm ureteral calculus at the left UVJ.    2. Nonobstructing left renal calculi measuring up to 1 cm as above.      Dr. Graves discussed the findings with NIKKI Drew at 8:24 PM 10/28/2020, with read back    CATHIE WONG M.D., RESIDENT RADIOLOGIST  This document has been electronically signed.  LEAH GRAVES MD; Attending Interventional Radiologist  This document has been electronically signed. Oct 28 2020  8:27PM

## 2020-10-28 NOTE — H&P ADULT - NSHPREVIEWOFSYSTEMS_GEN_ALL_CORE
Social History: No recreational drugs    Family History: No urologic family history    Review of Systems  Genitourinary: see HPI  Constitutional, Eyes, ENT, Cardiovascular, Respiratory, Gastrointestinal, Musculoskeletal, Integumentary and Neurological are otherwise negative.

## 2020-10-28 NOTE — H&P ADULT - ATTENDING COMMENTS
Pt seen and examined ct a/p images visualized demonstrating left distal ureteral stone. Pt is in septic shock now requiring pressors. I discussed her grave clinical condition w the family and patient. they understand that her clinical condition carries a high mortality.  also discussed case w dr macario of IR in terms of placing PCN. he is currently in the middle of an emergency and also pt has minimal hydro which will make them PCN challenging. therefore we will start w ureteral stent insertion. pt understands risks of worsening sepsis, bleeding, injury to gu tract, stricture, death.  cont broad spectrum iv abx  fu cultures  resuscitation  icu after

## 2020-10-28 NOTE — ED ADULT NURSE NOTE - NSIMPLEMENTINTERV_GEN_ALL_ED
Implemented All Universal Safety Interventions:  [place] to call system. Call bell, personal items and telephone within reach. Instruct patient to call for assistance. Room bathroom lighting operational. Non-slip footwear when patient is off stretcher. Physically safe environment: no spills, clutter or unnecessary equipment. Stretcher in lowest position, wheels locked, appropriate side rails in place.

## 2020-10-28 NOTE — ED ADULT NURSE NOTE - OBJECTIVE STATEMENT
Pt c/o worsening right sided chest pain and vomiting. Pt has hx of kidney stones. Pt febrile, pt almost finished with course of PO abx. Denies cp, sob, diarrhea, cough.

## 2020-10-28 NOTE — BRIEF OPERATIVE NOTE - OPERATION/FINDINGS
sp cystoscopy left ureteral stent insertion, purulent urine sent for culture.  recommend ICU admission cont broad spec abx  fu cx

## 2020-10-28 NOTE — ED PROVIDER NOTE - PHYSICAL EXAMINATION
CONST: NAD  EYES: Sclera and conjunctiva clear.   ENT: No nasal discharge. Oropharynx normal appearing   NECK: Non-tender, no meningeal signs. normal ROM. supple   CARD: S1 S2; No jvd  RESP: Equal BS B/L, No wheezes, rhonchi or rales. No distress  GI: LLQ tenderness. no cva tenderness. normal BS  MS: Normal ROM in all extremities. pulses 2 +. no calf tenderness or swelling  SKIN: Warm, dry, no acute rashes. Good turgor  NEURO: A&Ox3, No focal deficits. Strength 5/5 with no sensory deficits.

## 2020-10-28 NOTE — H&P ADULT - NSHPPHYSICALEXAM_GEN_ALL_CORE
Constitutional: NAD, alert & awake, well-developed  HEENT: EOMI  Neck: no pain  Back: + left lower CVA tenderness  Respiratory: No accessory respiratory muscle use  Abd: Soft, mild tenderness to LLQ, nondistended, no organomegaly    : voiding freely, bladder nonpalpable, no suprapubic tenderness elicited   Extremities: no edema  Neurological: A/O x 3  Psychiatric: Normal mood, normal affect  Skin: No rashes

## 2020-10-28 NOTE — ED PROVIDER NOTE - PROGRESS NOTE DETAILS
Sujit: pt given as a signout. On reassessment, pt laying in bed,  not in any distress. Pt noted to have a temp of 102 bp 101/53. US and CT pending. Abd: tender in the LLQ/LUQ, no peritonitis, no cva tenderness. Will continue to monitor. Sujit: pt noted to be hypotensive 62/30, will start pt on pressors. Pending CT findings. Will continue to monitor. Pt is AAOX3, ambulating not in any distress. Sujit: pt noted to be hypotensive 62/30, will give IVF. Pending CT findings. Will continue to monitor. Pt is AAOX3, ambulating not in any distress. Sujit: pt seen by urology. IR and urology will intervene today, pt to receive a stent. Will admit to SICU. Sujit: pt seen by urology. IR and urology will intervene today, pt to receive a stent. Will admit to SICU (accepted by Dr. Awan) Sujit: pt noted to be hypotensive 62/30, will give IVF and pressors. Pending CT findings. Will continue to monitor. Pt is AAOX3,  not in any distress.

## 2020-10-29 LAB
A1C WITH ESTIMATED AVERAGE GLUCOSE RESULT: 5.7 % — HIGH (ref 4–5.6)
ANION GAP SERPL CALC-SCNC: 13 MMOL/L — SIGNIFICANT CHANGE UP (ref 7–14)
BLD GP AB SCN SERPL QL: SIGNIFICANT CHANGE UP
BUN SERPL-MCNC: 22 MG/DL — HIGH (ref 10–20)
CALCIUM SERPL-MCNC: 8.2 MG/DL — LOW (ref 8.5–10.1)
CHLORIDE SERPL-SCNC: 106 MMOL/L — SIGNIFICANT CHANGE UP (ref 98–110)
CO2 SERPL-SCNC: 22 MMOL/L — SIGNIFICANT CHANGE UP (ref 17–32)
CREAT SERPL-MCNC: 1 MG/DL — SIGNIFICANT CHANGE UP (ref 0.7–1.5)
E COLI DNA BLD POS QL NAA+NON-PROBE: SIGNIFICANT CHANGE UP
ESTIMATED AVERAGE GLUCOSE: 117 MG/DL — HIGH (ref 68–114)
GLUCOSE BLDC GLUCOMTR-MCNC: 104 MG/DL — HIGH (ref 70–99)
GLUCOSE BLDC GLUCOMTR-MCNC: 107 MG/DL — HIGH (ref 70–99)
GLUCOSE BLDC GLUCOMTR-MCNC: 118 MG/DL — HIGH (ref 70–99)
GLUCOSE BLDC GLUCOMTR-MCNC: 125 MG/DL — HIGH (ref 70–99)
GLUCOSE BLDC GLUCOMTR-MCNC: 88 MG/DL — SIGNIFICANT CHANGE UP (ref 70–99)
GLUCOSE SERPL-MCNC: 117 MG/DL — HIGH (ref 70–99)
GRAM STN FLD: SIGNIFICANT CHANGE UP
HCT VFR BLD CALC: 33.1 % — LOW (ref 37–47)
HCV AB S/CO SERPL IA: 0.04 COI — SIGNIFICANT CHANGE UP
HCV AB SERPL-IMP: SIGNIFICANT CHANGE UP
HGB BLD-MCNC: 11 G/DL — LOW (ref 12–16)
LACTATE SERPL-SCNC: 1.7 MMOL/L — SIGNIFICANT CHANGE UP (ref 0.7–2)
MAGNESIUM SERPL-MCNC: 1.7 MG/DL — LOW (ref 1.8–2.4)
MCHC RBC-ENTMCNC: 30 PG — SIGNIFICANT CHANGE UP (ref 27–31)
MCHC RBC-ENTMCNC: 33.2 G/DL — SIGNIFICANT CHANGE UP (ref 32–37)
MCV RBC AUTO: 90.2 FL — SIGNIFICANT CHANGE UP (ref 81–99)
METHOD TYPE: SIGNIFICANT CHANGE UP
NRBC # BLD: 0 /100 WBCS — SIGNIFICANT CHANGE UP (ref 0–0)
PHOSPHATE SERPL-MCNC: 3.9 MG/DL — SIGNIFICANT CHANGE UP (ref 2.1–4.9)
PLATELET # BLD AUTO: 182 K/UL — SIGNIFICANT CHANGE UP (ref 130–400)
POTASSIUM SERPL-MCNC: 3.5 MMOL/L — SIGNIFICANT CHANGE UP (ref 3.5–5)
POTASSIUM SERPL-SCNC: 3.5 MMOL/L — SIGNIFICANT CHANGE UP (ref 3.5–5)
RBC # BLD: 3.67 M/UL — LOW (ref 4.2–5.4)
RBC # FLD: 11.9 % — SIGNIFICANT CHANGE UP (ref 11.5–14.5)
SODIUM SERPL-SCNC: 141 MMOL/L — SIGNIFICANT CHANGE UP (ref 135–146)
SPECIMEN SOURCE: SIGNIFICANT CHANGE UP
WBC # BLD: 24.25 K/UL — HIGH (ref 4.8–10.8)
WBC # FLD AUTO: 24.25 K/UL — HIGH (ref 4.8–10.8)

## 2020-10-29 PROCEDURE — 71045 X-RAY EXAM CHEST 1 VIEW: CPT | Mod: 26,77

## 2020-10-29 PROCEDURE — 99233 SBSQ HOSP IP/OBS HIGH 50: CPT

## 2020-10-29 PROCEDURE — 71045 X-RAY EXAM CHEST 1 VIEW: CPT | Mod: 26

## 2020-10-29 PROCEDURE — 99291 CRITICAL CARE FIRST HOUR: CPT

## 2020-10-29 RX ORDER — POTASSIUM CHLORIDE 20 MEQ
20 PACKET (EA) ORAL
Refills: 0 | Status: COMPLETED | OUTPATIENT
Start: 2020-10-29 | End: 2020-10-29

## 2020-10-29 RX ORDER — SODIUM CHLORIDE 9 MG/ML
250 INJECTION INTRAMUSCULAR; INTRAVENOUS; SUBCUTANEOUS ONCE
Refills: 0 | Status: DISCONTINUED | OUTPATIENT
Start: 2020-10-29 | End: 2020-10-29

## 2020-10-29 RX ORDER — SODIUM CHLORIDE 9 MG/ML
250 INJECTION, SOLUTION INTRAVENOUS ONCE
Refills: 0 | Status: COMPLETED | OUTPATIENT
Start: 2020-10-29 | End: 2020-10-29

## 2020-10-29 RX ORDER — SODIUM CHLORIDE 9 MG/ML
500 INJECTION, SOLUTION INTRAVENOUS ONCE
Refills: 0 | Status: COMPLETED | OUTPATIENT
Start: 2020-10-29 | End: 2020-10-29

## 2020-10-29 RX ORDER — MAGNESIUM SULFATE 500 MG/ML
2 VIAL (ML) INJECTION ONCE
Refills: 0 | Status: COMPLETED | OUTPATIENT
Start: 2020-10-29 | End: 2020-10-29

## 2020-10-29 RX ORDER — VANCOMYCIN HCL 1 G
1000 VIAL (EA) INTRAVENOUS DAILY
Refills: 0 | Status: DISCONTINUED | OUTPATIENT
Start: 2020-10-29 | End: 2020-10-30

## 2020-10-29 RX ORDER — POTASSIUM CHLORIDE 20 MEQ
20 PACKET (EA) ORAL ONCE
Refills: 0 | Status: COMPLETED | OUTPATIENT
Start: 2020-10-29 | End: 2020-10-29

## 2020-10-29 RX ADMIN — SODIUM CHLORIDE 3000 MILLILITER(S): 9 INJECTION, SOLUTION INTRAVENOUS at 06:53

## 2020-10-29 RX ADMIN — CHLORHEXIDINE GLUCONATE 1 APPLICATION(S): 213 SOLUTION TOPICAL at 05:32

## 2020-10-29 RX ADMIN — SODIUM CHLORIDE 3000 MILLILITER(S): 9 INJECTION, SOLUTION INTRAVENOUS at 09:30

## 2020-10-29 RX ADMIN — HEPARIN SODIUM 5000 UNIT(S): 5000 INJECTION INTRAVENOUS; SUBCUTANEOUS at 21:41

## 2020-10-29 RX ADMIN — HYDROMORPHONE HYDROCHLORIDE 0.5 MILLIGRAM(S): 2 INJECTION INTRAMUSCULAR; INTRAVENOUS; SUBCUTANEOUS at 00:58

## 2020-10-29 RX ADMIN — SODIUM CHLORIDE 1000 MILLILITER(S): 9 INJECTION, SOLUTION INTRAVENOUS at 10:05

## 2020-10-29 RX ADMIN — Medication 20 MILLIEQUIVALENT(S): at 05:32

## 2020-10-29 RX ADMIN — SODIUM CHLORIDE 3000 MILLILITER(S): 9 INJECTION, SOLUTION INTRAVENOUS at 12:30

## 2020-10-29 RX ADMIN — Medication 250 MILLIGRAM(S): at 14:03

## 2020-10-29 RX ADMIN — Medication 650 MILLIGRAM(S): at 20:13

## 2020-10-29 RX ADMIN — Medication 50 MILLIEQUIVALENT(S): at 08:56

## 2020-10-29 RX ADMIN — Medication 650 MILLIGRAM(S): at 20:12

## 2020-10-29 RX ADMIN — Medication 25 GRAM(S): at 00:49

## 2020-10-29 RX ADMIN — PANTOPRAZOLE SODIUM 40 MILLIGRAM(S): 20 TABLET, DELAYED RELEASE ORAL at 06:54

## 2020-10-29 RX ADMIN — SENNA PLUS 2 TABLET(S): 8.6 TABLET ORAL at 21:42

## 2020-10-29 RX ADMIN — HEPARIN SODIUM 5000 UNIT(S): 5000 INJECTION INTRAVENOUS; SUBCUTANEOUS at 14:03

## 2020-10-29 RX ADMIN — HYDROMORPHONE HYDROCHLORIDE 0.5 MILLIGRAM(S): 2 INJECTION INTRAMUSCULAR; INTRAVENOUS; SUBCUTANEOUS at 00:40

## 2020-10-29 RX ADMIN — HEPARIN SODIUM 5000 UNIT(S): 5000 INJECTION INTRAVENOUS; SUBCUTANEOUS at 05:33

## 2020-10-29 RX ADMIN — Medication 20 MILLIEQUIVALENT(S): at 03:49

## 2020-10-29 NOTE — PROGRESS NOTE ADULT - ATTENDING COMMENTS
pt seen and examined at bedside  agree with above  plan for discharge to home when stable  will need definitive treatment of the stone when recovered from sepsis  she is aware that a ureteral stent in inside and it has to be removed and the stone treated in the next few weeks

## 2020-10-29 NOTE — PROGRESS NOTE ADULT - ASSESSMENT
66y/o F s/p cystoscopy, placement of LEFT JJ stent for septic stone     - Cont SICU care for hemodynamic monitoring   - Cont Meropenem/Vancomycin   - DVT/GI ppx  - Adv diet as tolerated   - OOB-Chair, ambulate as tolerated  - Spoke with pt's son about pt's clinical condition  - Will d/w attending

## 2020-10-29 NOTE — PHYSICAL THERAPY INITIAL EVALUATION ADULT - GENERAL OBSERVATIONS, REHAB EVAL
13:45-14:15. chart reviewed. Pt received semi-crook at B/S, alert, oriented, able to follow multi-step instructions and agreeable to PT evaluation. Pt is Uzbek speaking, Son At B/S. + IV, + A Line, + monitoring, stable vitals, denies pain or discomfort. NAD.

## 2020-10-29 NOTE — PHYSICAL THERAPY INITIAL EVALUATION ADULT - BALANCE TRAINING, PT EVAL
increase S/D standing balance by 1/2 grade by discharge to promote safety awareness and decrease risk of falling.

## 2020-10-29 NOTE — PROGRESS NOTE ADULT - ASSESSMENT
65 y.o F POD#1 s/p cystoscopy, placement of LEFT JJ stent for septic stone   Sepsis      Plan:  Cont. IV Abx as per ID   F/U Urine Cx, adjust Abx accordingly   Strict I&O   Cont. Garcia catheter, d/c when stable  Analgesia prn pain   Cont. current ICU management   attending will F/U

## 2020-10-29 NOTE — PROGRESS NOTE ADULT - SUBJECTIVE AND OBJECTIVE BOX
UROLOGY: Pt is a 66y/o F s/p cystoscopy, placement of LEFT JJ stent. Pt seen and examined at bedside in PACU. Via [employer]  - pt denies any fevers, nausea, vomiting, chills, chest pain, SOB, or abdominal pain at this time.     [ x ] A 10 Point Review of Systems was negative except where noted    Vital signs  T(C): , Max: 39.4 (10-28-20 @ 10:06)  HR: 85 (10-29-20 @ 02:00)  BP: 154/69 (10-29-20 @ 02:00)  SpO2: 94% (10-29-20 @ 02:00)    Constitutional: NAD, well-developed  HEENT: EOMI  Neck: no pain  Back: No CVA tenderness  Respiratory: No accessory respiratory muscle use  Abd: Soft, NT/ND  no organomegally  no hernia  :  +aguilar catheter draining mildly cloudy yellow urine. bladder nonpalpable. no sp tenderness. no flank tenderness  Extremities: no edema  Neurological: A/O x 3  Psychiatric: Normal mood, normal affect  Skin: No rashes    Labs                    11.0   24.25 )-----------( 182      ( 28 Oct 2020 23:40 )             33.1     141    |  106    |  22     ----------------------------<  117    3.5     |  22     |  1.0      Ca    8.2        28 Oct 2020 23:40  Phos  3.9       28 Oct 2020 23:40  Mg     1.7       28 Oct 2020 23:40 UROLOGY: Pt is a 64y/o F s/p cystoscopy, placement of LEFT JJ stent for left ureteral stone.  Also has left renal stone. Pt seen and examined at bedside in PACU. Via [employer]  - pt denies any fevers, nausea, vomiting, chills, chest pain, SOB, or abdominal pain at this time.     [ x ] A 10 Point Review of Systems was negative except where noted    Vital signs  T(C): , Max: 39.4 (10-28-20 @ 10:06)  HR: 85 (10-29-20 @ 02:00)  BP: 154/69 (10-29-20 @ 02:00)  SpO2: 94% (10-29-20 @ 02:00)    Constitutional: NAD, well-developed  HEENT: EOMI  Neck: no pain  Back: No CVA tenderness  Respiratory: No accessory respiratory muscle use  Abd: Soft, NT/ND  no organomegally  no hernia  :  +aguilar catheter draining mildly cloudy yellow urine. bladder nonpalpable. no sp tenderness. no flank tenderness  Extremities: no edema  Neurological: A/O x 3  Psychiatric: Normal mood, normal affect  Skin: No rashes    Labs                    11.0   24.25 )-----------( 182      ( 28 Oct 2020 23:40 )             33.1     141    |  106    |  22     ----------------------------<  117    3.5     |  22     |  1.0      Ca    8.2        28 Oct 2020 23:40  Phos  3.9       28 Oct 2020 23:40  Mg     1.7       28 Oct 2020 23:40 UROLOGY: Pt is a 64y/o F s/p cystoscopy, placement of LEFT JJ stent for left ureteral stone.  Also has left renal stone. Pt seen and examined at bedside in PACU. Via [employer]  - pt denies any fevers, nausea, vomiting, chills, chest pain, SOB, or abdominal pain at this time.  septic shock has resolved.  CT images showing the stent and stone visualized by attending.    [ x ] A 10 Point Review of Systems was negative except where noted    Vital signs  T(C): , Max: 39.4 (10-28-20 @ 10:06)  HR: 85 (10-29-20 @ 02:00)  BP: 154/69 (10-29-20 @ 02:00)  SpO2: 94% (10-29-20 @ 02:00)    Constitutional: NAD, well-developed  HEENT: EOMI  Neck: no pain  Back: No CVA tenderness  Respiratory: No accessory respiratory muscle use  Abd: Soft, NT/ND  no organomegally  no hernia  :  +aguilar catheter draining mildly cloudy yellow urine. bladder nonpalpable. no sp tenderness. no flank tenderness  Extremities: no edema  Neurological: A/O x 3  Psychiatric: Normal mood, normal affect  Skin: No rashes    Labs                    11.0   24.25 )-----------( 182      ( 28 Oct 2020 23:40 )             33.1     141    |  106    |  22     ----------------------------<  117    3.5     |  22     |  1.0      Ca    8.2        28 Oct 2020 23:40  Phos  3.9       28 Oct 2020 23:40  Mg     1.7       28 Oct 2020 23:40

## 2020-10-29 NOTE — CONSULT NOTE ADULT - ASSESSMENT
ASSESSMENT  64 yo F nephrolithiasis s/p laser lithotripsy 8 years ago, was seen here by ED on 10/19 and was diagnosed with left pyelonephritis and sent home on vantin, now presents to the ED with L flank & LLQ abdominal pain for the past week.     IMPRESSION  #Severe sepsis due to L Pyelonephritis with obstructing stone    10/28 s/p cystoscopy left ureteral stent insertion, purulent urine    UA      10/19 UCX contaminant    CTAP Mild left hydroureteronephrosis extending to the level of an obstructing irregularly shaped 4.5 x 6.3 x 5.8  mm ureteral calculus at the left UVJ.    Nonobstructing left renal calculi measuring up to 1 cm as above.   #Severe Sepsis on admission T>101F, Pulse>90, WBC>12, lactic acidosis  #Lactic acidosis  #CT bibasilar atelectasis.      RECOMMENDATIONS  This is an incomplete consult note. All recommendations to follow after interview and examination of the patient.   - Vanc 1g q12h IV (D/C if no GP in cx)  - Please check vanc trough 30 min prior to 4th dose   - Kenney 1g q8h IV  - f/u BCX, UCX    If any questions, please call or send a message on [employer] Teams  Spectra [phone]     ASSESSMENT  66 yo F nephrolithiasis s/p laser lithotripsy 8 years ago, was seen here by ED on 10/19 and was diagnosed with left pyelonephritis and sent home on vantin, now presents to the ED with L flank & LLQ abdominal pain for the past week.     IMPRESSION  #Severe sepsis due to L Pyelonephritis with obstructing stone    10/28 s/p cystoscopy left ureteral stent insertion, purulent urine    UA      10/19 UCX contaminant    CTAP Mild left hydroureteronephrosis extending to the level of an obstructing irregularly shaped 4.5 x 6.3 x 5.8  mm ureteral calculus at the left UVJ.    Nonobstructing left renal calculi measuring up to 1 cm as above.   #Severe Sepsis on admission T>101F, Pulse>90, WBC>12, lactic acidosis  #Lactic acidosis  #CT bibasilar atelectasis.      RECOMMENDATIONS  - Vanc 1g q12h IV (D/C if no GP in cx)  - Please check vanc trough 30 min prior to 4th dose   - Kenney 1g q8h IV  - f/u BCX, UCX    If any questions, please call or send a message on [employer] Teams  Spectra [phone]

## 2020-10-29 NOTE — CONSULT NOTE ADULT - SUBJECTIVE AND OBJECTIVE BOX
SICU Consultation Note  =========================================================================================  BEBA HAND  MRN-[id number]    65y Female w/ PMHx of kidney stones and lithotripsy 8 years ago (unable to recall who her urologist was), presented to the ED 10 days ago diagnosed w/ L pyelonephritis Patient was sent home w/ antibiotic regimen. Patient returns to the ED today w/ worsening LLQ pain and L flank pain x1 week. Patient reports two episodes of vomiting. While in the ED, patient was hypotensive, tachycardic and febrile to 103 degrees F. Patient was resuscitated w/ 4L IVF, Central Line placed and started on Levophed while in the ED. Patient was scanned and found to have L sided pyelonephritis with several calculi within the L renal pelvis and L renal lower pole.      Imaging:  CT Abdomen and Pelvis w/ IV Cont (10.19.20 @ 17:48)   IMPRESSION:  1. Findings are compatible with acute left-sided pyelonephritis; several calculi within the left renal pelvis and left renal lower pole calyces measure up to 9 mm (approximately 1720 Hounsfield units.  2. Punctate nonobstructing right renal interpolar calculus.  3. Normal appendix.    Patient taken to the OR w/ Dr Salgado s/p L JJ urteral stent. Upon insertion of the stent, purulent urine was found. Patient remained on Levophed throughout the case. Patient was extubated successfully. Patient will be monitored in the SICU overnight.       Procedure: S/P L JJ urteral stent   OR Stats  OR Time:19 minutes    EBL: 1cc          IV Fluids: 800cc     UOP: 100cc     Findings- purulent urine     PMH  No pertinent past medical history  kidney stones   lithotripsy   History of hysterectomy  History of cholecystectomy        Home Meds:  None     Allergies   Intolerances    Current Medications:   acetaminophen   Tablet .. 650 milliGRAM(s) Oral every 6 hours PRN Mild Pain (1 - 3)  chlorhexidine 4% Liquid 1 Application(s) Topical <User Schedule>  heparin   Injectable 5000 Unit(s) SubCutaneous every 8 hours  HYDROmorphone  Injectable 0.5 milliGRAM(s) IV Push every 4 hours PRN Severe Pain (7 - 10)  insulin regular  human corrective regimen sliding scale   IV Push every 6 hours  lactated ringers. 1000 milliLiter(s) (110 mL/Hr) IV Continuous <Continuous>  meropenem  IVPB 1000 milliGRAM(s) IV Intermittent Once  norepinephrine Infusion 0.05 MICROgram(s)/kG/Min (3.28 mL/Hr) IV Continuous <Continuous>  oxyCODONE    IR 5 milliGRAM(s) Oral every 4 hours PRN Moderate Pain (4 - 6)  pantoprazole    Tablet 40 milliGRAM(s) Oral before breakfast  senna 2 Tablet(s) Oral at bedtime  vancomycin  IVPB 1000 milliGRAM(s) IV Intermittent daily      Advanced Directives: Full Code    ICU Vital Signs Last 24 Hrs  T(F): 97.8, Max: 103   HR: 79  BP: 134/73   BP(mean): 96  RR: 22   SpO2: 100%     I&O's Summary  Height (cm): 154.9 (10-28-20)  Weight (kg): 70 (10-28-20)  BMI (kg/m2): 29.2 (10-28-20)  BSA (m2): 1.69 (10-28-20)    Physical Exam:   ---------------------------------------------------------------------------------------  Neuro:     Exam: A&Ox3, no focal deficits    RESPIRATORY:  Lungs clear to auscultation b/l, Normal expansion/effort    CARDIOVASCULAR:  S1/S2.  RRR  No peripheral edema    GASTROINTESTINAL:  Abdomen soft, non-tender, non-distended. L CVA tenderness       MUSCULOSKELETAL:  Extremities warm, pink, well-perfused.    DERM:  No skin breakdown     :   Exam: Garcia catheter in place.       Tubes/Lines/Drains   ----------------------------------------------------------------------------------------------------------  [x] Peripheral IV  [X] R IJ TLC   [X] Urinary Catheter Garcia

## 2020-10-29 NOTE — PHYSICAL THERAPY INITIAL EVALUATION ADULT - BED MOBILITY TRAINING, PT EVAL
Pt will participate in supine to sit and reverse using side rails under supervision by discharge to facilitate return to PLOF.

## 2020-10-29 NOTE — CONSULT NOTE ADULT - ASSESSMENT
Assessment & Plan      NEURO:    Pain-controlled with Tylenol/Oxycodone and Dilaudid PRN     Monitor for changes in mental status     RESP:     Oxygen insufficiency-wean off NC to RA as tolerate    Maintain O2 saturation >95%     AM CXR     Increase activity as tolerated     IS 10x per hour       CARDS:     Hypotension-Levophed     NICOM PRN     Maintain normotension. MAP >65     EKG NSR     GI/NUTR:     Diet, NPO except meds/sips/chips     GI Prophylaxis- PPI     Bowel regimen- Senna       /RENAL:     L pyelonephritis     S/P L JJ ureteral stent     Garcia in place. Monitor I&Os     BUN/Cr 24/1.4 trend     Monitor lytes, replete as needed     LR @ 110cc/hr     Trend Lactate, resuscitate as needed     HEME/ONC:     DVT prophylaxis- HSQ     Trend H/H     ID:    Vanco/Meropenum     Afebrile     Trend WBC     Pending Urine Cx and Blood Cx         ENDO:    No dx. FS Q 6 while NPO     Glucose Glucose, Serum: 117 (10-28 @ 23:40)      LINES/DRAINS:  TYSON, Radha Cheek, R IJ TLC     DISPO:    SICU    D/W Dr Awan

## 2020-10-29 NOTE — CONSULT NOTE ADULT - ATTENDING COMMENTS
pyelonephritis   septic shock   lactic acidosis   admit to SICU   continue fluid resuscitation   wean pressors   continue IV antibiotics   f/u cultures

## 2020-10-29 NOTE — PHYSICAL THERAPY INITIAL EVALUATION ADULT - PERTINENT HX OF CURRENT PROBLEM, REHAB EVAL
65y Female w/ PMHx of kidney stones and lithotripsy 8 years ago (unable to recall who her urologist was), presented to the ED 10 days ago diagnosed w/ L pyelonephritis Patient was sent home w/ antibiotic regimen. Patient returns to the ED today w/ worsening LLQ pain and L flank pain x1 week. Patient reports two episodes of vomiting

## 2020-10-29 NOTE — PHYSICAL THERAPY INITIAL EVALUATION ADULT - GAIT TRAINING, PT EVAL
Pt will ambulate using RW or least restrictive AD for 200 ft under supervision by discharge to facilitate return to PLOF.

## 2020-10-29 NOTE — PROGRESS NOTE ADULT - SUBJECTIVE AND OBJECTIVE BOX
Subjective:   pt. seen and examined at bedside BICU in NAD. Garcia catheter drains very slightly tinged w blood urine. Pt. denies abdominal pain. Afebrile       ROS:    [ x ] A 10 Point Review of Systems was negative except where noted  [    ] Due to altered mental status/intubation, subjective information was not able to be obtained from the patient. History was obtained to the extent possible from review of the chart and collateral sources of information.     acetaminophen   Tablet .. 650 milliGRAM(s) Oral every 6 hours PRN  chlorhexidine 4% Liquid 1 Application(s) Topical <User Schedule>  heparin   Injectable 5000 Unit(s) SubCutaneous every 8 hours  insulin regular  human corrective regimen sliding scale   IV Push every 6 hours  meropenem  IVPB 1000 milliGRAM(s) IV Intermittent Once  oxyCODONE    IR 5 milliGRAM(s) Oral every 4 hours PRN  pantoprazole    Tablet 40 milliGRAM(s) Oral before breakfast  senna 2 Tablet(s) Oral at bedtime  vancomycin  IVPB 1000 milliGRAM(s) IV Intermittent daily        Vital Signs Last 24 Hrs  T(C): 36.9 (29 Oct 2020 15:58), Max: 38.4 (28 Oct 2020 18:30)  T(F): 98.5 (29 Oct 2020 15:58), Max: 101.2 (28 Oct 2020 18:30)  HR: 83 (29 Oct 2020 14:10) (75 - 106)  BP: 111/57 (29 Oct 2020 13:00) (67/42 - 164/74)  BP(mean): 81 (29 Oct 2020 13:00) (50 - 107)  RR: 22 (29 Oct 2020 14:10) (13 - 27)  SpO2: 97% (29 Oct 2020 14:10) (92% - 100%)      PE  Constitutional: NAD, well-developed, well nourished   HEENT: NC/AT, EOMI  Neck: no pain  Back: mild b/l CVAT   Respiratory: No accessory respiratory muscle use  Abd: well healed scar, Soft, NT/ND.     : Normal external female genitalia,  Garcia catheter drains very slightly tinged w blood urine  Extremities: no edema  Neurological: A/O x 3  Psychiatric: Normal mood, normal affect  Skin: No rashes    I&O's Detail    28 Oct 2020 07:01  -  29 Oct 2020 07:00  --------------------------------------------------------  IN:    IV PiggyBack: 50 mL    Lactated Ringers: 880 mL    Lactated Ringers Bolus: 500 mL    Norepinephrine: 117 mL  Total IN: 1547 mL    OUT:    Indwelling Catheter - Urethral (mL): 1095 mL  Total OUT: 1095 mL    Total NET: 452 mL      29 Oct 2020 07:01  -  29 Oct 2020 16:21  --------------------------------------------------------  IN:    Lactated Ringers: 110 mL    Norepinephrine: 13 mL  Total IN: 123 mL    OUT:    Indwelling Catheter - Urethral (mL): 1700 mL  Total OUT: 1700 mL    Total NET: -1577 mL          LABS:                        11.0   24.25 )-----------( 182      ( 28 Oct 2020 23:40 )             33.1     10-28    141  |  106  |  22<H>  ----------------------------<  117<H>  3.5   |  22  |  1.0    Ca    8.2<L>      28 Oct 2020 23:40  Phos  3.9     10-28  Mg     1.7     10-28    TPro  6.7  /  Alb  4.0  /  TBili  1.3<H>  /  DBili  x   /  AST  215<H>  /  ALT  225<H>  /  AlkPhos  213<H>  10-28      Urinalysis (10.28.20 @ 16:05)    pH Urine: 6.5    Glucose Qualitative, Urine: Negative    Blood, Urine: Moderate    Color: Yellow    Urine Appearance: Slightly Turbid    Bilirubin: Negative    Ketone - Urine: Negative    Specific Gravity: 1.014    Protein, Urine: Trace    Urobilinogen: <2 mg/dL    Nitrite: Positive    Leukocyte Esterase Concentration: Large      Urine Microscopic-Add On (NC) (10.28.20 @ 16:05)    Bacteria: Negative    Epithelial Cells: 1 /HPF    Hyaline Casts: 4 /LPF    Red Blood Cell - Urine: 2 /HPF    White Blood Cell - Urine: 130 /HPF    Culture - Blood (10.28.20 @ 10:38)    -  Escherichia coli: Detec    Gram Stain:   Growth in aerobic bottle: Gram Negative Rods    Specimen Source: .Blood Blood    Organism: Blood Culture PCR    Culture Results:   Growth in aerobic bottle: Gram Negative Rods  "Due to technical problems, Proteus sp. will Not be reported as part of  the BCID panel until further notice"  ***Blood Panel PCR results on this specimen are available  approximately 3 hours after the Gram stain result.***  Gram stain, PCR, and/or culture results may not always  correspond due to difference in methodologies.  ************************************************************  This PCR assay was performed using [employer].  The following targets are tested for: Enterococcus,  vancomycin resistant enterococci, Listeria monocytogenes,  coagulase negative staphylococci, S. aureus,  methicillin resistant S. aureus, Streptococcus agalactiae  (Group B), S. pneumoniae, S. pyogenes (Group A),  Acinetobacter baumannii, Enterobacter cloacae, E. coli,  Klebsiella oxytoca, K. pneumoniae, Proteus sp.,  Serratia marcescens, Haemophilus influenzae,  Neisseria meningitidis, Pseudomonas aeruginosa, Candida  albicans, C. glabrata, C krusei, C parapsilosis,  C. tropicalis and the KPC resistance gene.    Organism Identification: Blood Culture PCR    Method Type: PCR    Culture - Urine (10.19.20 @ 13:58)    Specimen Source: .Urine Clean Catch (Midstream)    Culture Results:   >=3 organisms. Probable collection contamination.          RADIOLOGY & ADDITIONAL STUDIES:

## 2020-10-29 NOTE — CONSULT NOTE ADULT - SUBJECTIVE AND OBJECTIVE BOX
BEBA HAND  65y, Female  Allergy: No Known Allergies      CHIEF COMPLAINT:   septic kidney stone (29 Oct 2020 04:29)      LOS  1d    HPI  HPI:  Patient is a 66 yo female with past history of kidney stones had laser lithotripsy 8 years ago, was seen here by ED on 10/19 and was diagnosed with left pyelonephritis and sent home on antibiotics, now presents to the ED with L flank & LLQ abdominal pain for the past week. Pt cannot recall name of Urologist that she was seeing 8 years ago. Denies any other medical issues. Pt admits to fevers at home with vomiting x 2 episodes. Pt also admits to dysuria. Denies chills, nausea, abdominal pain, suprapubic pain, difficulty voiding or hematuria.      (28 Oct 2020 20:43)      INFECTIOUS DISEASE HISTORY:   pending patient interview and exam   DC'ed on vantin from ER  Tm 103, fever curve downtrending  10/19 UCX contaminant     10/28 s/p cystoscopy left ureteral stent insertion, purulent urine    PMH  PAST MEDICAL & SURGICAL HISTORY:  No pertinent past medical history    History of hysterectomy    History of cholecystectomy        FAMILY HISTORY  non-contributory     SOCIAL HISTORY  Social History:        ROS  ***    VITALS:  T(F): 98.4, Max: 103 (10-28-20 @ 10:06)  HR: 90  BP: 127/58  RR: 24Vital Signs Last 24 Hrs  T(C): 36.9 (29 Oct 2020 08:00), Max: 39.4 (28 Oct 2020 10:06)  T(F): 98.4 (29 Oct 2020 08:00), Max: 103 (28 Oct 2020 10:06)  HR: 90 (29 Oct 2020 08:00) (75 - 120)  BP: 127/58 (29 Oct 2020 08:00) (67/34 - 164/74)  BP(mean): 84 (29 Oct 2020 08:00) (50 - 107)  RR: 24 (29 Oct 2020 08:00) (13 - 27)  SpO2: 94% (29 Oct 2020 08:00) (92% - 100%)    PHYSICAL EXAM:  ***    TESTS & MEASUREMENTS:                        11.0   24.25 )-----------( 182      ( 28 Oct 2020 23:40 )             33.1     10-28    141  |  106  |  22<H>  ----------------------------<  117<H>  3.5   |  22  |  1.0    Ca    8.2<L>      28 Oct 2020 23:40  Phos  3.9     10-28  Mg     1.7     10-28    TPro  6.7  /  Alb  4.0  /  TBili  1.3<H>  /  DBili  x   /  AST  215<H>  /  ALT  225<H>  /  AlkPhos  213<H>  10-28    eGFR if : 68 mL/min/1.73M2 (10-28-20 @ 23:40)  eGFR if Non African American: 59 mL/min/1.73M2 (10-28-20 @ 23:40)  eGFR if Non African American: 39 mL/min/1.73M2 (10-28-20 @ 10:38)  eGFR if : 46 mL/min/1.73M2 (10-28-20 @ 10:38)    LIVER FUNCTIONS - ( 28 Oct 2020 10:38 )  Alb: 4.0 g/dL / Pro: 6.7 g/dL / ALK PHOS: 213 U/L / ALT: 225 U/L / AST: 215 U/L / GGT: x           Urinalysis Basic - ( 28 Oct 2020 16:05 )    Color: Yellow / Appearance: Slightly Turbid / S.014 / pH: x  Gluc: x / Ketone: Negative  / Bili: Negative / Urobili: <2 mg/dL   Blood: x / Protein: Trace / Nitrite: Positive   Leuk Esterase: Large / RBC: 2 /HPF /  /HPF   Sq Epi: x / Non Sq Epi: 1 /HPF / Bacteria: Negative        Culture - Urine (collected 10-19-20 @ 13:58)  Source: .Urine Clean Catch (Midstream)  Final Report (10-20-20 @ 20:35):    >=3 organisms. Probable collection contamination.        Lactate, Blood: 1.7 mmol/L (10-28-20 @ 23:40)  Blood Gas Venous - Lactate: 1.7 mmoL/L (10-28-20 @ 12:43)  Lactate, Blood: 3.2 mmol/L (10-28-20 @ 10:38)      INFECTIOUS DISEASES TESTING      RADIOLOGY & ADDITIONAL TESTS:  I have personally reviewed the last Chest xray  CXR      CT  CT Abdomen and Pelvis w/ IV Cont:   EXAM:  CT ABDOMEN AND PELVIS IC            PROCEDURE DATE:  10/28/2020            INTERPRETATION:  CLINICAL STATEMENT: Flank pain.    TECHNIQUE: Contiguous axial CT images were obtained from the lower chest to the pubic symphysis following administration of 100cc Omnipaque 350 intravenous contrast.  Oral contrast was not administered.  Reformatted images in the coronal and sagittal planes were acquired.    COMPARISON CT: 10/19/2020.      FINDINGS:    LOWER CHEST: Bibasilar atelectasis.    HEPATOBILIARY: Post cholecystectomy.    SPLEEN: Unremarkable.    PANCREAS: Unremarkable.    ADRENAL GLANDS: Unremarkable.    KIDNEYS: Delayed left nephrogram with mild left hydroureteronephrosis extending to the level of an obstructing irregularly shaped 4.5 x 6.3 x 5.8 mm ureteral calculus at the left UVJ (Hounsfield units 779). No right hydronephrosis.    Additional nonobstructing left renal pelvis and lower pole calyces calculi measuring up to 1.0 cm in the left interpolar calyx. Previously noted punctate nonobstructing right renal interpolar calculus is not visualized on this exam. Stable left interpolar cyst. Additional subcentimeter bilateral renal hypodensities too small to further characterize.    ABDOMINOPELVIC NODES: Unremarkable.    PELVIC ORGANS: Post hysterectomy.    PERITONEUM/MESENTERY/BOWEL: No bowel obstruction, ascites or pneumoperitoneum. Normal appendix.    BONES/SOFT TISSUES: Degenerative changes of the spine.    OTHER: Vascular calcifications.      IMPRESSION:    1. Mild left hydroureteronephrosis extending to the level of an obstructing irregularly shaped 4.5 x 6.3 x 5.8  mm ureteral calculus at the left UVJ.    2. Nonobstructing left renal calculi measuring up to 1 cm as above.      Dr. Webster discussed the findings with NIKKI Drew at 8:24 PM 10/28/2020, with read back            CATHIE WONG M.D., RESIDENT RADIOLOGIST  This document has been electronically signed.  LEAH WEBSTER MD; Attending Interventional Radiologist  This document has been electronically signed. Oct 28 2020  8:27PM (10-28-20 @ 16:06)      CARDIOLOGY TESTING  12 Lead ECG:   Systolic  mmHg    Diastolic BP 74 mmHg    Ventricular Rate 82 BPM    Atrial Rate 82 BPM    P-R Interval 142 ms    QRS Duration 96 ms    Q-T Interval 396 ms    QTC Calculation(Bazett) 462 ms    P Axis 41 degrees    R Axis 34 degrees    T Axis 55 degrees    Diagnosis Line Normal sinus rhythm  Nonspecific ST abnormality  Abnormal ECG    Confirmed by Dereck Andre ([id number]) on 10/29/2020 7:14:26 AM (10-28-20 @ 23:17)      MEDICATIONS  chlorhexidine 4% Liquid 1 Topical <User Schedule>  heparin   Injectable 5000 SubCutaneous every 8 hours  insulin regular  human corrective regimen sliding scale  IV Push every 6 hours  lactated ringers Bolus 250 IV Bolus once  lactated ringers. 1000 IV Continuous <Continuous>  meropenem  IVPB 1000 IV Intermittent Once  norepinephrine Infusion 0.05 IV Continuous <Continuous>  pantoprazole    Tablet 40 Oral before breakfast  senna 2 Oral at bedtime  vancomycin  IVPB 1000 IV Intermittent daily      Weight  Weight (kg): 70.8 (10-29-20 @ 01:43)    ANTIBIOTICS:  meropenem  IVPB 1000 milliGRAM(s) IV Intermittent Once  vancomycin  IVPB 1000 milliGRAM(s) IV Intermittent daily      ALLERGIES:  No Known Allergies       BEBA HAND  65y, Female  Allergy: No Known Allergies      CHIEF COMPLAINT:   septic kidney stone (29 Oct 2020 04:29)      LOS  1d    HPI  HPI:  Patient is a 64 yo female with past history of kidney stones had laser lithotripsy 8 years ago, was seen here by ED on 10/19 and was diagnosed with left pyelonephritis and sent home on antibiotics, now presents to the ED with L flank & LLQ abdominal pain for the past week. Pt cannot recall name of Urologist that she was seeing 8 years ago. Denies any other medical issues. Pt admits to fevers at home with vomiting x 2 episodes. Pt also admits to dysuria. Denies chills, nausea, abdominal pain, suprapubic pain, difficulty voiding or hematuria.      (28 Oct 2020 20:43)      INFECTIOUS DISEASE HISTORY:  DC'ed on vantin from ER  Tm 103, fever curve downtrending  10/19 UCX contaminant     10/28 s/p cystoscopy left ureteral stent insertion, purulent urine    PMH  PAST MEDICAL & SURGICAL HISTORY:  No pertinent past medical history    History of hysterectomy    History of cholecystectomy        FAMILY HISTORY  non-contributory     SOCIAL HISTORY  Social History:        ROS  General: Denies rigors, nightsweats  HEENT: Denies headache, rhinorrhea, sore throat, eye pain  CV: Denies CP, palpitations  PULM: Denies wheezing, hemoptysis  GI: Denies hematemesis, hematochezia, melena  : as noted above   MSK: Denies arthralgias, myalgias  SKIN: Denies rash, lesions  NEURO: Denies paresthesias, weakness  PSYCH: Denies depression, anxiety     VITALS:  T(F): 98.4, Max: 103 (10-28-20 @ 10:06)  HR: 90  BP: 127/58  RR: 24Vital Signs Last 24 Hrs  T(C): 36.9 (29 Oct 2020 08:00), Max: 39.4 (28 Oct 2020 10:06)  T(F): 98.4 (29 Oct 2020 08:00), Max: 103 (28 Oct 2020 10:06)  HR: 90 (29 Oct 2020 08:00) (75 - 120)  BP: 127/58 (29 Oct 2020 08:00) (67/34 - 164/74)  BP(mean): 84 (29 Oct 2020 08:00) (50 - 107)  RR: 24 (29 Oct 2020 08:00) (13 - 27)  SpO2: 94% (29 Oct 2020 08:00) (92% - 100%)    PHYSICAL EXAM:  Gen: NAD, resting in bed  HEENT: Normocephalic, atraumatic  Neck: supple, no lymphadenopathy  CV: Regular rate & regular rhythm  Lungs: decreased BS at bases, no fremitus  Abdomen: Soft, BS present, some suprapubic tenderness to palpation   Ext: Warm, well perfused  Neuro: non focal, awake  Skin: no rash, no erythema  Lines: no phlebitis     TESTS & MEASUREMENTS:                        11.0   24.25 )-----------( 182      ( 28 Oct 2020 23:40 )             33.1     10-28    141  |  106  |  22<H>  ----------------------------<  117<H>  3.5   |  22  |  1.0    Ca    8.2<L>      28 Oct 2020 23:40  Phos  3.9     10-28  Mg     1.7     10-28    TPro  6.7  /  Alb  4.0  /  TBili  1.3<H>  /  DBili  x   /  AST  215<H>  /  ALT  225<H>  /  AlkPhos  213<H>  10-28    eGFR if : 68 mL/min/1.73M2 (10-28-20 @ 23:40)  eGFR if Non African American: 59 mL/min/1.73M2 (10-28-20 @ 23:40)  eGFR if Non African American: 39 mL/min/1.73M2 (10-28-20 @ 10:38)  eGFR if : 46 mL/min/1.73M2 (10-28-20 @ 10:38)    LIVER FUNCTIONS - ( 28 Oct 2020 10:38 )  Alb: 4.0 g/dL / Pro: 6.7 g/dL / ALK PHOS: 213 U/L / ALT: 225 U/L / AST: 215 U/L / GGT: x           Urinalysis Basic - ( 28 Oct 2020 16:05 )    Color: Yellow / Appearance: Slightly Turbid / S.014 / pH: x  Gluc: x / Ketone: Negative  / Bili: Negative / Urobili: <2 mg/dL   Blood: x / Protein: Trace / Nitrite: Positive   Leuk Esterase: Large / RBC: 2 /HPF /  /HPF   Sq Epi: x / Non Sq Epi: 1 /HPF / Bacteria: Negative        Culture - Urine (collected 10-19-20 @ 13:58)  Source: .Urine Clean Catch (Midstream)  Final Report (10-20-20 @ 20:35):    >=3 organisms. Probable collection contamination.        Lactate, Blood: 1.7 mmol/L (10-28-20 @ 23:40)  Blood Gas Venous - Lactate: 1.7 mmoL/L (10-28-20 @ 12:43)  Lactate, Blood: 3.2 mmol/L (10-28-20 @ 10:38)      INFECTIOUS DISEASES TESTING      RADIOLOGY & ADDITIONAL TESTS:  I have personally reviewed the last Chest xray  CXR      CT  CT Abdomen and Pelvis w/ IV Cont:   EXAM:  CT ABDOMEN AND PELVIS IC            PROCEDURE DATE:  10/28/2020            INTERPRETATION:  CLINICAL STATEMENT: Flank pain.    TECHNIQUE: Contiguous axial CT images were obtained from the lower chest to the pubic symphysis following administration of 100cc Omnipaque 350 intravenous contrast.  Oral contrast was not administered.  Reformatted images in the coronal and sagittal planes were acquired.    COMPARISON CT: 10/19/2020.      FINDINGS:    LOWER CHEST: Bibasilar atelectasis.    HEPATOBILIARY: Post cholecystectomy.    SPLEEN: Unremarkable.    PANCREAS: Unremarkable.    ADRENAL GLANDS: Unremarkable.    KIDNEYS: Delayed left nephrogram with mild left hydroureteronephrosis extending to the level of an obstructing irregularly shaped 4.5 x 6.3 x 5.8 mm ureteral calculus at the left UVJ (Hounsfield units 779). No right hydronephrosis.    Additional nonobstructing left renal pelvis and lower pole calyces calculi measuring up to 1.0 cm in the left interpolar calyx. Previously noted punctate nonobstructing right renal interpolar calculus is not visualized on this exam. Stable left interpolar cyst. Additional subcentimeter bilateral renal hypodensities too small to further characterize.    ABDOMINOPELVIC NODES: Unremarkable.    PELVIC ORGANS: Post hysterectomy.    PERITONEUM/MESENTERY/BOWEL: No bowel obstruction, ascites or pneumoperitoneum. Normal appendix.    BONES/SOFT TISSUES: Degenerative changes of the spine.    OTHER: Vascular calcifications.      IMPRESSION:    1. Mild left hydroureteronephrosis extending to the level of an obstructing irregularly shaped 4.5 x 6.3 x 5.8  mm ureteral calculus at the left UVJ.    2. Nonobstructing left renal calculi measuring up to 1 cm as above.      Dr. Webster discussed the findings with NIKKI Drew at 8:24 PM 10/28/2020, with read back            CATHIE WONG M.D., RESIDENT RADIOLOGIST  This document has been electronically signed.  LEAH WEBSTER MD; Attending Interventional Radiologist  This document has been electronically signed. Oct 28 2020  8:27PM (10-28-20 @ 16:06)      CARDIOLOGY TESTING  12 Lead ECG:   Systolic  mmHg    Diastolic BP 74 mmHg    Ventricular Rate 82 BPM    Atrial Rate 82 BPM    P-R Interval 142 ms    QRS Duration 96 ms    Q-T Interval 396 ms    QTC Calculation(Bazett) 462 ms    P Axis 41 degrees    R Axis 34 degrees    T Axis 55 degrees    Diagnosis Line Normal sinus rhythm  Nonspecific ST abnormality  Abnormal ECG    Confirmed by Dereck Andre ([id number]) on 10/29/2020 7:14:26 AM (10-28-20 @ 23:17)      MEDICATIONS  chlorhexidine 4% Liquid 1 Topical <User Schedule>  heparin   Injectable 5000 SubCutaneous every 8 hours  insulin regular  human corrective regimen sliding scale  IV Push every 6 hours  lactated ringers Bolus 250 IV Bolus once  lactated ringers. 1000 IV Continuous <Continuous>  meropenem  IVPB 1000 IV Intermittent Once  norepinephrine Infusion 0.05 IV Continuous <Continuous>  pantoprazole    Tablet 40 Oral before breakfast  senna 2 Oral at bedtime  vancomycin  IVPB 1000 IV Intermittent daily      Weight  Weight (kg): 70.8 (10-29-20 @ 01:43)    ANTIBIOTICS:  meropenem  IVPB 1000 milliGRAM(s) IV Intermittent Once  vancomycin  IVPB 1000 milliGRAM(s) IV Intermittent daily      ALLERGIES:  No Known Allergies

## 2020-10-30 LAB
ANION GAP SERPL CALC-SCNC: 7 MMOL/L — SIGNIFICANT CHANGE UP (ref 7–14)
ANION GAP SERPL CALC-SCNC: 9 MMOL/L — SIGNIFICANT CHANGE UP (ref 7–14)
BUN SERPL-MCNC: 10 MG/DL — SIGNIFICANT CHANGE UP (ref 10–20)
BUN SERPL-MCNC: 9 MG/DL — LOW (ref 10–20)
CALCIUM SERPL-MCNC: 8.3 MG/DL — LOW (ref 8.5–10.1)
CALCIUM SERPL-MCNC: 8.4 MG/DL — LOW (ref 8.5–10.1)
CHLORIDE SERPL-SCNC: 107 MMOL/L — SIGNIFICANT CHANGE UP (ref 98–110)
CHLORIDE SERPL-SCNC: 107 MMOL/L — SIGNIFICANT CHANGE UP (ref 98–110)
CO2 SERPL-SCNC: 23 MMOL/L — SIGNIFICANT CHANGE UP (ref 17–32)
CO2 SERPL-SCNC: 24 MMOL/L — SIGNIFICANT CHANGE UP (ref 17–32)
CREAT SERPL-MCNC: 0.5 MG/DL — LOW (ref 0.7–1.5)
CREAT SERPL-MCNC: 0.5 MG/DL — LOW (ref 0.7–1.5)
CULTURE RESULTS: SIGNIFICANT CHANGE UP
GLUCOSE BLDC GLUCOMTR-MCNC: 95 MG/DL — SIGNIFICANT CHANGE UP (ref 70–99)
GLUCOSE SERPL-MCNC: 102 MG/DL — HIGH (ref 70–99)
GLUCOSE SERPL-MCNC: 106 MG/DL — HIGH (ref 70–99)
HCT VFR BLD CALC: 31 % — LOW (ref 37–47)
HGB BLD-MCNC: 10.1 G/DL — LOW (ref 12–16)
MAGNESIUM SERPL-MCNC: 1.8 MG/DL — SIGNIFICANT CHANGE UP (ref 1.8–2.4)
MAGNESIUM SERPL-MCNC: 2.1 MG/DL — SIGNIFICANT CHANGE UP (ref 1.8–2.4)
MCHC RBC-ENTMCNC: 29.5 PG — SIGNIFICANT CHANGE UP (ref 27–31)
MCHC RBC-ENTMCNC: 32.6 G/DL — SIGNIFICANT CHANGE UP (ref 32–37)
MCV RBC AUTO: 90.6 FL — SIGNIFICANT CHANGE UP (ref 81–99)
NRBC # BLD: 0 /100 WBCS — SIGNIFICANT CHANGE UP (ref 0–0)
PHOSPHATE SERPL-MCNC: 2.1 MG/DL — SIGNIFICANT CHANGE UP (ref 2.1–4.9)
PHOSPHATE SERPL-MCNC: 2.7 MG/DL — SIGNIFICANT CHANGE UP (ref 2.1–4.9)
PLATELET # BLD AUTO: 171 K/UL — SIGNIFICANT CHANGE UP (ref 130–400)
POTASSIUM SERPL-MCNC: 3.5 MMOL/L — SIGNIFICANT CHANGE UP (ref 3.5–5)
POTASSIUM SERPL-MCNC: 4 MMOL/L — SIGNIFICANT CHANGE UP (ref 3.5–5)
POTASSIUM SERPL-SCNC: 3.5 MMOL/L — SIGNIFICANT CHANGE UP (ref 3.5–5)
POTASSIUM SERPL-SCNC: 4 MMOL/L — SIGNIFICANT CHANGE UP (ref 3.5–5)
RBC # BLD: 3.42 M/UL — LOW (ref 4.2–5.4)
RBC # FLD: 11.9 % — SIGNIFICANT CHANGE UP (ref 11.5–14.5)
SODIUM SERPL-SCNC: 137 MMOL/L — SIGNIFICANT CHANGE UP (ref 135–146)
SODIUM SERPL-SCNC: 140 MMOL/L — SIGNIFICANT CHANGE UP (ref 135–146)
SPECIMEN SOURCE: SIGNIFICANT CHANGE UP
WBC # BLD: 13.16 K/UL — HIGH (ref 4.8–10.8)
WBC # FLD AUTO: 13.16 K/UL — HIGH (ref 4.8–10.8)

## 2020-10-30 PROCEDURE — 99291 CRITICAL CARE FIRST HOUR: CPT

## 2020-10-30 RX ORDER — POTASSIUM PHOSPHATE, MONOBASIC POTASSIUM PHOSPHATE, DIBASIC 236; 224 MG/ML; MG/ML
30 INJECTION, SOLUTION INTRAVENOUS ONCE
Refills: 0 | Status: COMPLETED | OUTPATIENT
Start: 2020-10-30 | End: 2020-10-30

## 2020-10-30 RX ADMIN — POTASSIUM PHOSPHATE, MONOBASIC POTASSIUM PHOSPHATE, DIBASIC 83.33 MILLIMOLE(S): 236; 224 INJECTION, SOLUTION INTRAVENOUS at 05:45

## 2020-10-30 RX ADMIN — PANTOPRAZOLE SODIUM 40 MILLIGRAM(S): 20 TABLET, DELAYED RELEASE ORAL at 04:47

## 2020-10-30 RX ADMIN — HEPARIN SODIUM 5000 UNIT(S): 5000 INJECTION INTRAVENOUS; SUBCUTANEOUS at 04:47

## 2020-10-30 RX ADMIN — SENNA PLUS 2 TABLET(S): 8.6 TABLET ORAL at 21:33

## 2020-10-30 RX ADMIN — HEPARIN SODIUM 5000 UNIT(S): 5000 INJECTION INTRAVENOUS; SUBCUTANEOUS at 21:33

## 2020-10-30 RX ADMIN — Medication 250 MILLIGRAM(S): at 15:21

## 2020-10-30 RX ADMIN — HEPARIN SODIUM 5000 UNIT(S): 5000 INJECTION INTRAVENOUS; SUBCUTANEOUS at 15:21

## 2020-10-30 NOTE — PROGRESS NOTE ADULT - ASSESSMENT
66 yo F with PMH of cholecystectomy, s/p cystoscopy LEFT double J stent placement, POD#2. Patient doing well,  downgraded to floor, afebrile, WBC trending down 24.25  >13.16     Plan   - Garcia in place draining clear yellow urine, Continue Garcia care,   - I&Os   - F/u ID recs for abx  - F/u UCx   - Analgesics for pain control prn   - OOB to chair, ambulate as tolerated   - Continue to monitor VS  - Encourage Incentive spirometer   - DVT/GI ppx   - Will d/w attending

## 2020-10-30 NOTE — PROGRESS NOTE ADULT - SUBJECTIVE AND OBJECTIVE BOX
BEBA HAND  65y, Female  Allergy: No Known Allergies      LOS  2d    CHIEF COMPLAINT: septic kidney stone (30 Oct 2020 06:51)      INTERVAL EVENTS/HPI  - No acute events overnight, 10/28 BCX GNR  - T(F): , Max: 99 (10-29-20 @ 07:40)  - Tolerating medication  - WBC Count: 13.16 (10-30-20 @ 00:03) downtrending   WBC Count: 24.25 (10-28-20 @ 23:40)  - Creatinine, Serum: 0.5 (10-30-20 @ 00:03)  Creatinine, Serum: 1.0 (10-28-20 @ 23:40)       ROS  ***    VITALS:  T(F): 98.3, Max: 99 (10-29-20 @ 07:40)  HR: 72  BP: 117/71  RR: 19Vital Signs Last 24 Hrs  T(C): 36.8 (30 Oct 2020 04:00), Max: 37.2 (29 Oct 2020 07:40)  T(F): 98.3 (30 Oct 2020 04:00), Max: 99 (29 Oct 2020 07:40)  HR: 72 (30 Oct 2020 07:00) (69 - 92)  BP: 117/71 (30 Oct 2020 07:00) (98/57 - 130/66)  BP(mean): 88 (30 Oct 2020 07:00) (72 - 94)  RR: 19 (30 Oct 2020 07:00) (15 - 31)  SpO2: 98% (30 Oct 2020 07:00) (94% - 99%)    PHYSICAL EXAM:  ***    FH: Non-contributory  Social Hx: Non-contributory    TESTS & MEASUREMENTS:                        10.1   13.16 )-----------( 171      ( 30 Oct 2020 00:03 )             31.0     10-    137  |  107  |  10  ----------------------------<  106<H>  3.5   |  23  |  0.5<L>    Ca    8.4<L>      30 Oct 2020 00:03  Phos  2.1     10-30  Mg     2.1     10-30    TPro  6.7  /  Alb  4.0  /  TBili  1.3<H>  /  DBili  x   /  AST  215<H>  /  ALT  225<H>  /  AlkPhos  213<H>  10-28    eGFR if Non African American: 102 mL/min/1.73M2 (10-30-20 @ 00:03)  eGFR if African American: 118 mL/min/1.73M2 (10-30-20 @ 00:03)    LIVER FUNCTIONS - ( 28 Oct 2020 10:38 )  Alb: 4.0 g/dL / Pro: 6.7 g/dL / ALK PHOS: 213 U/L / ALT: 225 U/L / AST: 215 U/L / GGT: x           Urinalysis Basic - ( 28 Oct 2020 16:05 )    Color: Yellow / Appearance: Slightly Turbid / S.014 / pH: x  Gluc: x / Ketone: Negative  / Bili: Negative / Urobili: <2 mg/dL   Blood: x / Protein: Trace / Nitrite: Positive   Leuk Esterase: Large / RBC: 2 /HPF /  /HPF   Sq Epi: x / Non Sq Epi: 1 /HPF / Bacteria: Negative        Culture - Urine (collected 10-28-20 @ 23:00)  Source: .Urine None  Final Report (10-30-20 @ 06:49):    <10,000 CFU/mL Normal Urogenital Honey    Culture - Blood (collected 10-28-20 @ 10:38)  Source: .Blood Blood  Gram Stain (10-29-20 @ 13:32):    Growth in aerobic bottle: Gram Negative Rods  Preliminary Report (10-29-20 @ 13:32):    Growth in aerobic bottle: Gram Negative Rods    "Due to technical problems, Proteus sp. will Not be reported as part of    the BCID panel until further notice"    ***Blood Panel PCR results on this specimen are available    approximately 3 hours after the Gram stain result.***    Gram stain, PCR, and/or culture results may not always    correspond due to difference in methodologies.    ************************************************************    This PCR assay was performed using [employer].    The following targets are tested for: Enterococcus,    vancomycin resistant enterococci, Listeria monocytogenes,    coagulase negative staphylococci, S. aureus,    methicillin resistant S. aureus, Streptococcus agalactiae    (Group B), S. pneumoniae, S. pyogenes (Group A),    Acinetobacter baumannii, Enterobacter cloacae, E. coli,    Klebsiella oxytoca, K. pneumoniae, Proteus sp.,    Serratia marcescens, Haemophilus influenzae,    Neisseria meningitidis, Pseudomonas aeruginosa, Candida    albicans, C. glabrata, C krusei, C parapsilosis,    C. tropicalis and the KPC resistance gene.  Organism: Blood Culture PCR (10-29-20 @ 15:05)  Organism: Blood Culture PCR (10-29-20 @ 15:05)      -  Escherichia coli: Detec      Method Type: PCR    Culture - Blood (collected 10-28-20 @ 10:38)  Source: .Blood Blood  Preliminary Report (10-29-20 @ 18:02):    No growth to date.    Culture - Urine (collected 10-19-20 @ 13:58)  Source: .Urine Clean Catch (Midstream)  Final Report (10-20-20 @ 20:35):    >=3 organisms. Probable collection contamination.        Lactate, Blood: 1.7 mmol/L (10-28-20 @ 23:40)  Blood Gas Venous - Lactate: 1.7 mmoL/L (10-28-20 @ 12:43)  Lactate, Blood: 3.2 mmol/L (10-28-20 @ 10:38)      INFECTIOUS DISEASES TESTING  Rapid RVP Result: NotDetec (10-28-20 @ 20:54)      INFLAMMATORY MARKERS      RADIOLOGY & ADDITIONAL TESTS:  I have personally reviewed the last available Chest xray  CXR      CT  CT Abdomen and Pelvis w/ IV Cont:   EXAM:  CT ABDOMEN AND PELVIS IC            PROCEDURE DATE:  10/28/2020            INTERPRETATION:  CLINICAL STATEMENT: Flank pain.    TECHNIQUE: Contiguous axial CT images were obtained from the lower chest to the pubic symphysis following administration of 100cc Omnipaque 350 intravenous contrast.  Oral contrast was not administered.  Reformatted images in the coronal and sagittal planes were acquired.    COMPARISON CT: 10/19/2020.      FINDINGS:    LOWER CHEST: Bibasilar atelectasis.    HEPATOBILIARY: Post cholecystectomy.    SPLEEN: Unremarkable.    PANCREAS: Unremarkable.    ADRENAL GLANDS: Unremarkable.    KIDNEYS: Delayed left nephrogram with mild left hydroureteronephrosis extending to the level of an obstructing irregularly shaped 4.5 x 6.3 x 5.8 mm ureteral calculus at the left UVJ (Hounsfield units 779). No right hydronephrosis.    Additional nonobstructing left renal pelvis and lower pole calyces calculi measuring up to 1.0 cm in the left interpolar calyx. Previously noted punctate nonobstructing right renal interpolar calculus is not visualized on this exam. Stable left interpolar cyst. Additional subcentimeter bilateral renal hypodensities too small to further characterize.    ABDOMINOPELVIC NODES: Unremarkable.    PELVIC ORGANS: Post hysterectomy.    PERITONEUM/MESENTERY/BOWEL: No bowel obstruction, ascites or pneumoperitoneum. Normal appendix.    BONES/SOFT TISSUES: Degenerative changes of the spine.    OTHER: Vascular calcifications.      IMPRESSION:    1. Mild left hydroureteronephrosis extending to the level of an obstructing irregularly shaped 4.5 x 6.3 x 5.8  mm ureteral calculus at the left UVJ.    2. Nonobstructing left renal calculi measuring up to 1 cm as above.      Dr. Webster discussed the findings with NIKKI Drew at 8:24 PM 10/28/2020, with read back            CATHIE WONG M.D., RESIDENT RADIOLOGIST  This document has been electronically signed.  LEAH WEBSTER MD; Attending Interventional Radiologist  This document has been electronically signed. Oct 28 2020  8:27PM (10-28-20 @ 16:06)      CARDIOLOGY TESTING  12 Lead ECG:   Systolic  mmHg    Diastolic BP 74 mmHg    Ventricular Rate 83 BPM    Atrial Rate 83 BPM    P-R Interval 134 ms    QRS Duration 94 ms    Q-T Interval 394 ms    QTC Calculation(Bazett) 462 ms    P Axis 42 degrees    R Axis 34 degrees    T Axis 54 degrees    Diagnosis Line Normal sinus rhythm  Nonspecific ST abnormality  Abnormal ECG    Confirmed by RUSSELL WOMACK MD ([id number]) on 10/29/2020 9:28:52 PM (10-28-20 @ 23:17)  12 Lead ECG:   Systolic  mmHg    Diastolic BP 74 mmHg    Ventricular Rate 82 BPM    Atrial Rate 82 BPM    P-R Interval 142 ms    QRS Duration 96 ms    Q-T Interval 396 ms    QTC Calculation(Bazett) 462 ms    P Axis 41 degrees    R Axis 34 degrees    T Axis 55 degrees    Diagnosis Line Normal sinus rhythm  Nonspecific ST abnormality  Abnormal ECG    Confirmed by Dereck Andre ([id number]) on 10/29/2020 7:14:26 AM (10-28-20 @ 23:17)      MEDICATIONS  chlorhexidine 4% Liquid 1 Topical <User Schedule>  heparin   Injectable 5000 SubCutaneous every 8 hours  meropenem  IVPB 1000 IV Intermittent Once  pantoprazole    Tablet 40 Oral before breakfast  senna 2 Oral at bedtime  vancomycin  IVPB 1000 IV Intermittent daily      WEIGHT  Weight (kg): 70.8 (10-29-20 @ 01:43)  Creatinine, Serum: 0.5 mg/dL (10-30-20 @ 00:03)      ANTIBIOTICS:  meropenem  IVPB 1000 milliGRAM(s) IV Intermittent Once  vancomycin  IVPB 1000 milliGRAM(s) IV Intermittent daily      All available historical records have been reviewed       BEBA HAND  65y, Female  Allergy: No Known Allergies      LOS  2d    CHIEF COMPLAINT: septic kidney stone (30 Oct 2020 06:51)      INTERVAL EVENTS/HPI  - No acute events overnight, 10/28 BCX GNR  - T(F): , Max: 99 (10-29-20 @ 07:40)  - Tolerating medication  - WBC Count: 13.16 (10-30-20 @ 00:03) downtrending   WBC Count: 24.25 (10-28-20 @ 23:40)  - Creatinine, Serum: 0.5 (10-30-20 @ 00:03)  Creatinine, Serum: 1.0 (10-28-20 @ 23:40)       ROS  General: Denies rigors, nightsweats  HEENT: Denies headache, rhinorrhea, sore throat, eye pain  CV: Denies CP, palpitations  PULM: Denies wheezing, hemoptysis  GI: Denies hematemesis, hematochezia, melena  : Denies discharge, hematuria  MSK: Denies arthralgias, myalgias  SKIN: Denies rash, lesions  NEURO: Denies paresthesias, weakness  PSYCH: Denies depression, anxiety     VITALS:  T(F): 98.3, Max: 99 (10-29-20 @ 07:40)  HR: 72  BP: 117/71  RR: 19Vital Signs Last 24 Hrs  T(C): 36.8 (30 Oct 2020 04:00), Max: 37.2 (29 Oct 2020 07:40)  T(F): 98.3 (30 Oct 2020 04:00), Max: 99 (29 Oct 2020 07:40)  HR: 72 (30 Oct 2020 07:00) (69 - 92)  BP: 117/71 (30 Oct 2020 07:00) (98/57 - 130/66)  BP(mean): 88 (30 Oct 2020 07:00) (72 - 94)  RR: 19 (30 Oct 2020 07:00) (15 - 31)  SpO2: 98% (30 Oct 2020 07:00) (94% - 99%)    PHYSICAL EXAM:  Gen: NAD, resting in bed  HEENT: Normocephalic, atraumatic  Neck: supple, no lymphadenopathy  CV: Regular rate & regular rhythm  Lungs: decreased BS at bases, no fremitus  Abdomen: Soft, BS present no suprapubic tenderness, no CVAT   Ext: Warm, well perfused  Neuro: non focal, awake  Skin: no rash, no erythema  Lines: no phlebitis     FH: Non-contributory  Social Hx: Non-contributory    TESTS & MEASUREMENTS:                        10.1   13.16 )-----------( 171      ( 30 Oct 2020 00:03 )             31.0     10    137  |  107  |  10  ----------------------------<  106<H>  3.5   |  23  |  0.5<L>    Ca    8.4<L>      30 Oct 2020 00:03  Phos  2.1     10-30  Mg     2.1     10-30    TPro  6.7  /  Alb  4.0  /  TBili  1.3<H>  /  DBili  x   /  AST  215<H>  /  ALT  225<H>  /  AlkPhos  213<H>  10-28    eGFR if Non African American: 102 mL/min/1.73M2 (10-30-20 @ 00:03)  eGFR if African American: 118 mL/min/1.73M2 (10-30-20 @ 00:03)    LIVER FUNCTIONS - ( 28 Oct 2020 10:38 )  Alb: 4.0 g/dL / Pro: 6.7 g/dL / ALK PHOS: 213 U/L / ALT: 225 U/L / AST: 215 U/L / GGT: x           Urinalysis Basic - ( 28 Oct 2020 16:05 )    Color: Yellow / Appearance: Slightly Turbid / S.014 / pH: x  Gluc: x / Ketone: Negative  / Bili: Negative / Urobili: <2 mg/dL   Blood: x / Protein: Trace / Nitrite: Positive   Leuk Esterase: Large / RBC: 2 /HPF /  /HPF   Sq Epi: x / Non Sq Epi: 1 /HPF / Bacteria: Negative        Culture - Urine (collected 10-28-20 @ 23:00)  Source: .Urine None  Final Report (10-30-20 @ 06:49):    <10,000 CFU/mL Normal Urogenital Honey    Culture - Blood (collected 10-28-20 @ 10:38)  Source: .Blood Blood  Gram Stain (10-29-20 @ 13:32):    Growth in aerobic bottle: Gram Negative Rods  Preliminary Report (10-29-20 @ 13:32):    Growth in aerobic bottle: Gram Negative Rods    "Due to technical problems, Proteus sp. will Not be reported as part of    the BCID panel until further notice"    ***Blood Panel PCR results on this specimen are available    approximately 3 hours after the Gram stain result.***    Gram stain, PCR, and/or culture results may not always    correspond due to difference in methodologies.    ************************************************************    This PCR assay was performed using [employer].    The following targets are tested for: Enterococcus,    vancomycin resistant enterococci, Listeria monocytogenes,    coagulase negative staphylococci, S. aureus,    methicillin resistant S. aureus, Streptococcus agalactiae    (Group B), S. pneumoniae, S. pyogenes (Group A),    Acinetobacter baumannii, Enterobacter cloacae, E. coli,    Klebsiella oxytoca, K. pneumoniae, Proteus sp.,    Serratia marcescens, Haemophilus influenzae,    Neisseria meningitidis, Pseudomonas aeruginosa, Candida    albicans, C. glabrata, C krusei, C parapsilosis,    C. tropicalis and the KPC resistance gene.  Organism: Blood Culture PCR (10-29-20 @ 15:05)  Organism: Blood Culture PCR (10-29-20 @ 15:05)      -  Escherichia coli: Detec      Method Type: PCR    Culture - Blood (collected 10-28-20 @ 10:38)  Source: .Blood Blood  Preliminary Report (10-29-20 @ 18:02):    No growth to date.    Culture - Urine (collected 10-19-20 @ 13:58)  Source: .Urine Clean Catch (Midstream)  Final Report (10-20-20 @ 20:35):    >=3 organisms. Probable collection contamination.        Lactate, Blood: 1.7 mmol/L (10-28-20 @ 23:40)  Blood Gas Venous - Lactate: 1.7 mmoL/L (10-28-20 @ 12:43)  Lactate, Blood: 3.2 mmol/L (10-28-20 @ 10:38)      INFECTIOUS DISEASES TESTING  Rapid RVP Result: NotDetec (10-28-20 @ 20:54)      INFLAMMATORY MARKERS      RADIOLOGY & ADDITIONAL TESTS:  I have personally reviewed the last available Chest xray  CXR      CT  CT Abdomen and Pelvis w/ IV Cont:   EXAM:  CT ABDOMEN AND PELVIS IC            PROCEDURE DATE:  10/28/2020            INTERPRETATION:  CLINICAL STATEMENT: Flank pain.    TECHNIQUE: Contiguous axial CT images were obtained from the lower chest to the pubic symphysis following administration of 100cc Omnipaque 350 intravenous contrast.  Oral contrast was not administered.  Reformatted images in the coronal and sagittal planes were acquired.    COMPARISON CT: 10/19/2020.      FINDINGS:    LOWER CHEST: Bibasilar atelectasis.    HEPATOBILIARY: Post cholecystectomy.    SPLEEN: Unremarkable.    PANCREAS: Unremarkable.    ADRENAL GLANDS: Unremarkable.    KIDNEYS: Delayed left nephrogram with mild left hydroureteronephrosis extending to the level of an obstructing irregularly shaped 4.5 x 6.3 x 5.8 mm ureteral calculus at the left UVJ (Hounsfield units 779). No right hydronephrosis.    Additional nonobstructing left renal pelvis and lower pole calyces calculi measuring up to 1.0 cm in the left interpolar calyx. Previously noted punctate nonobstructing right renal interpolar calculus is not visualized on this exam. Stable left interpolar cyst. Additional subcentimeter bilateral renal hypodensities too small to further characterize.    ABDOMINOPELVIC NODES: Unremarkable.    PELVIC ORGANS: Post hysterectomy.    PERITONEUM/MESENTERY/BOWEL: No bowel obstruction, ascites or pneumoperitoneum. Normal appendix.    BONES/SOFT TISSUES: Degenerative changes of the spine.    OTHER: Vascular calcifications.      IMPRESSION:    1. Mild left hydroureteronephrosis extending to the level of an obstructing irregularly shaped 4.5 x 6.3 x 5.8  mm ureteral calculus at the left UVJ.    2. Nonobstructing left renal calculi measuring up to 1 cm as above.      Dr. Webster discussed the findings with NIKKI Drew at 8:24 PM 10/28/2020, with read back            CATHIE WONG M.D., RESIDENT RADIOLOGIST  This document has been electronically signed.  LEAH WEBSTER MD; Attending Interventional Radiologist  This document has been electronically signed. Oct 28 2020  8:27PM (10-28-20 @ 16:06)      CARDIOLOGY TESTING  12 Lead ECG:   Systolic  mmHg    Diastolic BP 74 mmHg    Ventricular Rate 83 BPM    Atrial Rate 83 BPM    P-R Interval 134 ms    QRS Duration 94 ms    Q-T Interval 394 ms    QTC Calculation(Bazett) 462 ms    P Axis 42 degrees    R Axis 34 degrees    T Axis 54 degrees    Diagnosis Line Normal sinus rhythm  Nonspecific ST abnormality  Abnormal ECG    Confirmed by RUSSELL WOMACK MD ([id number]) on 10/29/2020 9:28:52 PM (10-28-20 @ 23:17)  12 Lead ECG:   Systolic  mmHg    Diastolic BP 74 mmHg    Ventricular Rate 82 BPM    Atrial Rate 82 BPM    P-R Interval 142 ms    QRS Duration 96 ms    Q-T Interval 396 ms    QTC Calculation(Bazett) 462 ms    P Axis 41 degrees    R Axis 34 degrees    T Axis 55 degrees    Diagnosis Line Normal sinus rhythm  Nonspecific ST abnormality  Abnormal ECG    Confirmed by Dereck Andre ([id number]) on 10/29/2020 7:14:26 AM (10-28-20 @ 23:17)      MEDICATIONS  chlorhexidine 4% Liquid 1 Topical <User Schedule>  heparin   Injectable 5000 SubCutaneous every 8 hours  meropenem  IVPB 1000 IV Intermittent Once  pantoprazole    Tablet 40 Oral before breakfast  senna 2 Oral at bedtime  vancomycin  IVPB 1000 IV Intermittent daily      WEIGHT  Weight (kg): 70.8 (10-29-20 @ 01:43)  Creatinine, Serum: 0.5 mg/dL (10-30-20 @ 00:03)      ANTIBIOTICS:  meropenem  IVPB 1000 milliGRAM(s) IV Intermittent Once  vancomycin  IVPB 1000 milliGRAM(s) IV Intermittent daily      All available historical records have been reviewed

## 2020-10-30 NOTE — PROGRESS NOTE ADULT - SUBJECTIVE AND OBJECTIVE BOX
BEBA HAND  [id number]  65y Female    Indication for ICU admission: s/p L JJ urteral stent     Admit Date:10-28-20  ICU Date: 10/29/2020  OR Date: 10/28/20     No Known Allergies    PAST MEDICAL & SURGICAL HISTORY:  No pertinent past medical history  History of hysterectomy  History of cholecystectomy  Kidney Stones/Lithotripsy     Home Medications:  None       24HRS EVENT:  Day  - septic shock 2/2 pyuria  - bcx and urine cx   - nicom 22% - given 500cc bolus   - advanced diet to clears -> regular   - trend lactate   - ID c/s - on vanc and carri   -Culture 10/28 prelim neg  - IVL     Night-   Blood culture- 1 0f 2 sets with E. Coli in anaerobic bottle    DVT PTX: HSQ     GI PTX: PPI       ***Tubes/Lines/Drains  ***  Peripheral IV  Central Venous Line     	Date   A line   Garcia       REVIEW OF SYSTEMS    [x] A ten-point review of systems was otherwise negative except as noted.  [ ] Due to altered mental status/intubation, subjective information were not able to be obtained from the patient. History was obtained, to the extent possible, from review of the chart and collateral sources of information.   BEBA HAND  [id number]  65y Female    Indication for ICU admission: s/p L JJ urteral stent     Admit Date:10-28-20  ICU Date: 10/29/2020  OR Date: 10/28/20     No Known Allergies    PAST MEDICAL & SURGICAL HISTORY:  No pertinent past medical history  History of hysterectomy  History of cholecystectomy  Kidney Stones/Lithotripsy     Home Medications:  None       24HRS EVENT:  Day  - septic shock 2/2 pyuria  - bcx and urine cx   - nicom 22% - given 500cc bolus   - advanced diet to clears -> regular   - trend lactate   - ID c/s - on vanc and carri   -Culture 10/28 prelim neg  - IVL     Night-   Blood culture- 1 0f 2 sets with E. Coli in anaerobic bottle    DVT PTX: HSQ     GI PTX: PPI       ***Tubes/Lines/Drains  ***  Peripheral IV	  Garcia       REVIEW OF SYSTEMS    [x] A ten-point review of systems was otherwise negative except as noted.  [ ] Due to altered mental status/intubation, subjective information were not able to be obtained from the patient. History was obtained, to the extent possible, from review of the chart and collateral sources of information.        Daily     Daily Weight in k (30 Oct 2020 04:00)    Diet, Regular (10-29-20 @ 11:28)      CURRENT MEDS:  Neurologic Medications  acetaminophen   Tablet .. 650 milliGRAM(s) Oral every 6 hours PRN Mild Pain (1 - 3)  oxyCODONE    IR 5 milliGRAM(s) Oral every 4 hours PRN Moderate Pain (4 - 6)    Respiratory Medications    Cardiovascular Medications    Gastrointestinal Medications  pantoprazole    Tablet 40 milliGRAM(s) Oral before breakfast  senna 2 Tablet(s) Oral at bedtime    Genitourinary Medications    Hematologic/Oncologic Medications  heparin   Injectable 5000 Unit(s) SubCutaneous every 8 hours    Antimicrobial/Immunologic Medications  meropenem  IVPB 1000 milliGRAM(s) IV Intermittent Once  vancomycin  IVPB 1000 milliGRAM(s) IV Intermittent daily    Endocrine/Metabolic Medications    Topical/Other Medications  chlorhexidine 4% Liquid 1 Application(s) Topical <User Schedule>      ICU Vital Signs Last 24 Hrs  T(C): 36.8 (30 Oct 2020 04:00), Max: 36.9 (29 Oct 2020 15:58)  T(F): 98.3 (30 Oct 2020 04:00), Max: 98.5 (29 Oct 2020 15:58)  HR: 72 (30 Oct 2020 08:00) (69 - 90)  BP: 128/71 (30 Oct 2020 08:00) (98/57 - 130/66)  BP(mean): 92 (30 Oct 2020 08:00) (72 - 94)  ABP: 105/57 (30 Oct 2020 03:00) (97/52 - 125/58)  ABP(mean): 77 (30 Oct 2020 03:00) (72 - 86)  RR: 15 (30 Oct 2020 08:00) (15 - 31)  SpO2: 98% (30 Oct 2020 08:00) (94% - 99%)      Adult Advanced Hemodynamics Last 24 Hrs  CVP(mm Hg): --  CVP(cm H2O): --  CO: 6.2 (29 Oct 2020 12:) (6.2 - 6.2)  CI: 3.6 (29 Oct 2020 12:) (3.6 - 3.6)  PA: --  PA(mean): --  PCWP: --  SVR: --  SVRI: --  PVR: --  PVRI: --          I&O's Summary    29 Oct 2020 07:  -  30 Oct 2020 07:00  --------------------------------------------------------  IN: 289 mL / OUT: 3265 mL / NET: -2976 mL    30 Oct 2020 07:01  -  30 Oct 2020 09:00  --------------------------------------------------------  IN: 166 mL / OUT: 500 mL / NET: -334 mL      I&O's Detail    29 Oct 2020 07:  -  30 Oct 2020 07:00  --------------------------------------------------------  IN:    IV PiggyBack: 166 mL    Lactated Ringers: 110 mL    Norepinephrine: 13 mL  Total IN: 289 mL    OUT:    Indwelling Catheter - Urethral (mL): 3265 mL  Total OUT: 3265 mL    Total NET: -2976 mL      30 Oct 2020 07:01  -  30 Oct 2020 09:00  --------------------------------------------------------  IN:    IV PiggyBack: 166 mL  Total IN: 166 mL    OUT:    Indwelling Catheter - Urethral (mL): 500 mL  Total OUT: 500 mL    Total NET: -334 mL            PHYSICAL EXAM:    NEURO: AAOX3 NO FND     CHEST/LUNG:  RA, bilateral breath sounds. no WRR     HEART:  S1 S2. SINUS.     ABDOMEN:   soft, non-distended. mild diffuse ttp             EXTREMITIES: no LE edema. distal pulses equal and symmetric.         CXR:     LABS:  Labs:  CAPILLARY BLOOD GLUCOSE      POCT Blood Glucose.: 88 mg/dL (29 Oct 2020 17:17)  POCT Blood Glucose.: 125 mg/dL (29 Oct 2020 12:35)                          10.1   13.16 )-----------( 171      ( 30 Oct 2020 00:03 )             31.0         10-30    137  |  107  |  10  ----------------------------<  106<H>  3.5   |  23  |  0.5<L>      Calcium, Total Serum: 8.4 mg/dL (10-30-20 @ 00:03)      LFTs:             6.7  | 1.3  | 215      ------------------[213     ( 28 Oct 2020 10:38 )  4.0  | x    | 225         Lipase:14     Amylase:x         Lactate, Blood: 1.7 mmol/L (10-28-20 @ 23:40)  Blood Gas Venous - Lactate: 1.7 mmoL/L (10-28-20 @ 12:43)  Lactate, Blood: 3.2 mmol/L (10-28-20 @ 10:38)      Coags:        Urinalysis Basic - ( 28 Oct 2020 16:05 )    Color: Yellow / Appearance: Slightly Turbid / S.014 / pH: x  Gluc: x / Ketone: Negative  / Bili: Negative / Urobili: <2 mg/dL   Blood: x / Protein: Trace / Nitrite: Positive   Leuk Esterase: Large / RBC: 2 /HPF /  /HPF   Sq Epi: x / Non Sq Epi: 1 /HPF / Bacteria: Negative        Culture - Urine (collected 28 Oct 2020 23:00)  Source: .Urine None  Final Report (30 Oct 2020 06:49):    <10,000 CFU/mL Normal Urogenital Honey    Culture - Urine (collected 28 Oct 2020 16:05)  Source: .Urine Clean Catch (Midstream)  Final Report (30 Oct 2020 08:05):    <10,000 CFU/mL Normal Urogenital Honey    Culture - Blood (collected 28 Oct 2020 10:38)  Source: .Blood Blood  Gram Stain (29 Oct 2020 13:32):    Growth in aerobic bottle: Gram Negative Rods  Preliminary Report (29 Oct 2020 13:32):    Growth in aerobic bottle: Gram Negative Rods    "Due to technical problems, Proteus sp. will Not be reported as part of    the BCID panel until further notice"    ***Blood Panel PCR results on this specimen are available    approximately 3 hours after the Gram stain result.***    Gram stain, PCR, and/or culture results may not always    correspond due to difference in methodologies.    ************************************************************    This PCR assay was performed using [employer].    The following targets are tested for: Enterococcus,    vancomycin resistant enterococci, Listeria monocytogenes,    coagulase negative staphylococci, S. aureus,    methicillin resistant S. aureus, Streptococcus agalactiae    (Group B), S. pneumoniae, S. pyogenes (Group A),    Acinetobacter baumannii, Enterobacter cloacae, E. coli,    Klebsiella oxytoca, K. pneumoniae, Proteus sp.,    Serratia marcescens, Haemophilus influenzae,    Neisseria meningitidis, Pseudomonas aeruginosa, Candida    albicans, C. glabrata, C krusei, C parapsilosis,    C. tropicalis and the KPC resistance gene.  Organism: Blood Culture PCR (29 Oct 2020 15:05)  Organism: Blood Culture PCR (29 Oct 2020 15:05)    Culture - Blood (collected 28 Oct 2020 10:38)  Source: .Blood Blood  Preliminary Report (29 Oct 2020 18:02):    No growth to date.

## 2020-10-30 NOTE — PROGRESS NOTE ADULT - ASSESSMENT
Assessment & Plan      NEURO:    Pain-controlled with Tylenol/Oxycodone     Monitor for changes in mental status     RESP:     Oxygen insufficiency-wean off NC to RA as tolerate    Maintain O2 saturation >95%     AM CXR     Increase activity as tolerated     IS 10x per hour       CARDS:     septic shock-off levophed since 10/29 AM      Maintain normotension. MAP >65     EKG NSR     GI/NUTR:     Diet - Regular     GI Prophylaxis- PPI     Bowel regimen- Senna       /RENAL:     L pyelonephritis     S/P L JJ ureteral stent     Garcia in place. Monitor I&Os     BUN/Cr 22/1.0 trend     Monitor lytes, replete as needed     LR @ 110cc/hr     Lactate: 3.2 --> 1.7     HEME/ONC:     DVT prophylaxis- HSQ     Trend H/H     ID:    Vanco/Meropenum - in accordance with ID reccs     Get VANC TROUGH 30 mins prior to fourth dose (on 10/31)     Afebrile     Trend WBC 25     Pending Urine Cx and Blood Cx         ENDO:    No dx. ISS.     Glucose Glucose, Serum: 117 (10-28 @ 23:40)      LINES/DRAINS:  Adia MEHTA Foley, R IJ TLC     DISPO:    SICU     Assessment & Plan      NEURO:    Pain-controlled with Tylenol/Oxycodone     Monitor for changes in mental status     RESP:     Oxygen insufficiency-wean off NC to RA as tolerate    Maintain O2 saturation >95%     AM CXR     Increase activity as tolerated     IS 10x per hour       CARDS:     septic shock-off levophed since 10/29 AM      Maintain normotension. MAP >65     EKG NSR     GI/NUTR:     Diet - Regular     GI Prophylaxis- PPI     Bowel regimen- Senna       /RENAL:     L pyelonephritis     S/P L JJ ureteral stent     Agrcia in place. Monitor I&Os     BUN/Cr 22/1.0 trend     Monitor lytes, replete as needed     LR @ 110cc/hr     Lactate: 3.2 --> 1.7     HEME/ONC:     DVT prophylaxis- HSQ     Trend H/H     ID:    Vanco/Meropenum - in accordance with ID reccs     Get VANC TROUGH 30 mins prior to fourth dose (on 10/31)     Afebrile     Trend WBC 25     Pending Urine Cx and Blood Cx         ENDO:    No dx. ISS.     Glucose Glucose, Serum: 117 (10-28 @ 23:40)      LINES/DRAINS:  PIV, Garcia    DISPO:    SICU

## 2020-10-30 NOTE — PROGRESS NOTE ADULT - ATTENDING COMMENTS
Covering Dr. Salgado  Pt was seen and examined.   Agree with above  Creatinine normalized.  May remove aguilar catheter  Monitor UO

## 2020-10-30 NOTE — PROGRESS NOTE ADULT - SUBJECTIVE AND OBJECTIVE BOX
UROLOGY:     66 yo F with PMH of cholecystectomy, s/p cystoscopy LEFT double J stent placement, POD#2. Patient seen and examined at bedside. Patient reports mild headache this morning. Denies any fever, chills, N/V, SOB, CP, dysuria, hematuria. Patient currenly has aguilar in place draining clear yellow urine.    [ x ] A 10 Point Review of Systems was negative except where noted  [    ] Due to altered mental status/intubation, subjective information was not able to be obtained from the patient. History was obtained to the extent possible from review of the chart and collateral sources of information.      Vital signs  T(C): , Max: 36.9 (10-29-20 @ 15:58)  HR: 80 (10-30-20 @ 09:18)  BP: 120/67 (10-30-20 @ 09:18)  SpO2: 100% (10-30-20 @ 09:18)    Constitutional: NAD, patient lying comfortably in bed,  well-developed  HEENT: EOMI  Neck: no pain  Back: No CVA tenderness  b/l   Respiratory: No accessory respiratory muscle use  Abd: Soft, NT/ND  no organomegally  no hernia  :  Aguilar in place draining clear yellow urine, no suprapubic tenderness  Extremities: no edema  Skin: No rashes    Labs                        10.1   13.16 )-----------( 171      ( 30 Oct 2020 00:03 )             31.0     30 Oct 2020 00:03    137    |  107    |  10     ----------------------------<  106    3.5     |  23     |  0.5      Ca    8.4        30 Oct 2020 00:03  Phos  2.1       30 Oct 2020 00:03  Mg     2.1       30 Oct 2020 00:03    I&O's Detail    29 Oct 2020 07:01  -  30 Oct 2020 07:00  --------------------------------------------------------  IN:    IV PiggyBack: 166 mL    Lactated Ringers: 110 mL    Norepinephrine: 13 mL  Total IN: 289 mL    OUT:    Indwelling Catheter - Urethral (mL): 3265 mL  Total OUT: 3265 mL    Total NET: -2976 mL      30 Oct 2020 07:01  -  30 Oct 2020 11:49  --------------------------------------------------------  IN:    IV PiggyBack: 166 mL  Total IN: 166 mL    OUT:    Indwelling Catheter - Urethral (mL): 500 mL  Total OUT: 500 mL    Total NET: -334 mL      Urinalysis Basic - ( 28 Oct 2020 16:05 )    Color: Yellow / Appearance: Slightly Turbid / S.014 / pH: x  Gluc: x / Ketone: Negative  / Bili: Negative / Urobili: <2 mg/dL   Blood: x / Protein: Trace / Nitrite: Positive   Leuk Esterase: Large / RBC: 2 /HPF /  /HPF   Sq Epi: x / Non Sq Epi: 1 /HPF / Bacteria: Negative    Culture - Urine (10.28.20 @ 23:00)   Specimen Source: .Urine None   Culture Results:   <10,000 CFU/mL Normal Urogenital Honey       Historical Values  Culture - Urine (10.28.20 @ 23:00)   Specimen Source: .Urine None   Culture Results:   <10,000 CFU/mL Normal Urogenital Honey   Culture - Urine (10.28.20 @ 16:05)   Specimen Source: .Urine Clean Catch (Midstream)   Culture Results:   <10,000 CFU/mL Normal Urogenital Honey   Culture - Urine (10.19.20 @ 13:58)   Specimen Source: .Urine Clean Catch (Midstream)   Culture Results:   >=3 organisms. Probable collection contamination.   Culture - Urine (19 @ 01:10)   - Amikacin: S <=16   - Ampicillin: R >16 These ampicillin results predict results for amoxicillin   - Ampicillin/Sulbactam: R >16/8 Enterobacter, Citrobacter, and Serratia may develop resistance during prolonged therapy (3-4 days)   - Aztreonam: S <=4   - Cefazolin: S 16 ([id number]) For uncomplicated UTI with K. pneumoniae, E. coli, or P. mirablis: ALFRED <=16 is sensitive and ALFRED >=32 is resistant. This also predicts results for oral agents cefaclor, cefdinir, cefpodoxime, cefprozil, cefuroxime axetil, cephalexin and locarbef for uncomplicated UTI. Note that some isolates may be susceptible to these agents while testing resistant to cefazolin.   - Cefepime: S <=4   - Cefoxitin: S <=8   - Ceftriaxone: S <=1 Enterobacter, Citrobacter, and Serratia may develop resistance during prolonged therapy   - Ciprofloxacin: S <=1   - Gentamicin: S <=4   - Imipenem: S <=1   - Levofloxacin: S <=2   - Meropenem: S <=1   - Nitrofurantoin: S <=32 Should not be used to treat pyelonephritis   - Piperacillin/Tazobactam: I 64   - Tigecycline: S <=2   - Tobramycin: S <=4   - Trimethoprim/Sulfamethoxazole: R >   Specimen Source: .Urine Clean Catch (Midstream)   Culture Results:   >100,000 CFU/ml Escherichia coli   Organism Identification: Escherichia coli   Organism: Escherichia coli   Method Type: ALFRED     Imaging

## 2020-10-30 NOTE — PROGRESS NOTE ADULT - ASSESSMENT
ASSESSMENT  66 yo F nephrolithiasis s/p laser lithotripsy 8 years ago, was seen here by ED on 10/19 and was diagnosed with left pyelonephritis and sent home on vantin, now presents to the ED with L flank & LLQ abdominal pain for the past week.     IMPRESSION  #Severe sepsis due to Ecoli bacteremia from L Pyelonephritis with obstructing stone    10/28 BCX GNR    10/28 s/p cystoscopy left ureteral stent insertion, purulent urine    UA      10/19 UCX contaminant    CTAP Mild left hydroureteronephrosis extending to the level of an obstructing irregularly shaped 4.5 x 6.3 x 5.8  mm ureteral calculus at the left UVJ.    Nonobstructing left renal calculi measuring up to 1 cm as above.   #Severe Sepsis on admission T>101F, Pulse>90, WBC>12, lactic acidosis  #Lactic acidosis  #CT bibasilar atelectasis.      RECOMMENDATIONS  - D/C VANC  - Kenney 1g q8h IV, f/u ecoli sensitivities and narrow when possible  This is a preliminary incomplete pended note, all final recommendations to follow after interview and examination of the patient.     If any questions, please call or send a message on [employer] Teams  Spectra [phone]     ASSESSMENT  66 yo F nephrolithiasis s/p laser lithotripsy 8 years ago, was seen here by ED on 10/19 and was diagnosed with left pyelonephritis and sent home on vantin, now presents to the ED with L flank & LLQ abdominal pain for the past week.     IMPRESSION  #Severe sepsis due to Ecoli bacteremia from L Pyelonephritis with obstructing stone    10/28 BCX GNR    10/28 s/p cystoscopy left ureteral stent insertion, purulent urine    UA      10/19 UCX contaminant    CTAP Mild left hydroureteronephrosis extending to the level of an obstructing irregularly shaped 4.5 x 6.3 x 5.8  mm ureteral calculus at the left UVJ.    Nonobstructing left renal calculi measuring up to 1 cm as above.   #Severe Sepsis on admission T>101F, Pulse>90, WBC>12, lactic acidosis  #Lactic acidosis  #CT bibasilar atelectasis.      RECOMMENDATIONS  - D/C VANC  - Kenney 1g q8h IV, f/u ecoli sensitivities and narrow when possible    If any questions, please call or send a message on [employer] Teams  Spectra [phone]

## 2020-10-30 NOTE — CHART NOTE - NSCHARTNOTEFT_GEN_A_CORE
SICU Transfer Note    Transfer from: SICU  Transfer to: Med/ Surg floor  Endorsed to NIKKI Suarez    SICU COURSE:    66 y/o female recently discharged form [facility] with pyelonephritis presented to the ED on 10/28 with worsening LLQ and left flank pain. She had an obstructing left renal calculus and was taken to the OR emergently for cystoscopy and a double J stent. Patient had a pressor requirement and was weaned off at 8am on 10/29 after fluid resuscitation.  Her aguilar remains in place with adequate UO, diet has been advanced to regular.    Blood culture form 10/28 1 of 2 sets with E. Coli in the aerobic bottle    Vital Signs Last 24 Hrs  T(C): 36.3 (29 Oct 2020 20:00), Max: 37.2 (29 Oct 2020 07:40)  T(F): 97.3 (29 Oct 2020 20:00), Max: 99 (29 Oct 2020 07:40)  HR: 81 (30 Oct 2020 02:00) (80 - 92)  BP: 116/64 (30 Oct 2020 02:00) (98/57 - 136/62)  BP(mean): 83 (30 Oct 2020 02:00) (72 - 92)  RR: 24 (30 Oct 2020 02:00) (17 - 31)  SpO2: 97% (30 Oct 2020 02:00) (92% - 99%)  I&O's Summary    28 Oct 2020 07:01  -  29 Oct 2020 07:00  --------------------------------------------------------  IN: 1547 mL / OUT: 1095 mL / NET: 452 mL    29 Oct 2020 07:01  -  30 Oct 2020 02:51  --------------------------------------------------------  IN: 123 mL / OUT: 2990 mL / NET: -2867 mL          MEDICATIONS  (STANDING):  chlorhexidine 4% Liquid 1 Application(s) Topical <User Schedule>  heparin   Injectable 5000 Unit(s) SubCutaneous every 8 hours  meropenem  IVPB 1000 milliGRAM(s) IV Intermittent Once  pantoprazole    Tablet 40 milliGRAM(s) Oral before breakfast  potassium phosphate IVPB 30 milliMole(s) IV Intermittent once  senna 2 Tablet(s) Oral at bedtime  vancomycin  IVPB 1000 milliGRAM(s) IV Intermittent daily    MEDICATIONS  (PRN):  acetaminophen   Tablet .. 650 milliGRAM(s) Oral every 6 hours PRN Mild Pain (1 - 3)  oxyCODONE    IR 5 milliGRAM(s) Oral every 4 hours PRN Moderate Pain (4 - 6)        LABS                                            10.1                  Neurophils% (auto):   x      (10-30 @ 00:03):    13.16)-----------(171          Lymphocytes% (auto):  x                                             31.0                   Eosinphils% (auto):   x        Manual%: Neutrophils x    ; Lymphocytes x    ; Eosinophils x    ; Bands%: x    ; Blasts x                                    137    |  107    |  10                  Calcium: 8.4   / iCa: x      (10-30 @ 00:03)    ----------------------------<  106       Magnesium: 2.1                              3.5     |  23     |  0.5              Phosphorous: 2.1        ASSESSMENT & PLAN:       NEURO:    Pain-controlled with Tylenol/Oxycodone     A&O x 3     RESP:     Oxygen insufficiency-wean off NC to RA as tolerate    Maintain O2 saturation >95%     Increase activity as tolerated     IS 10x per hour       CARDS:     septic shock-off levophed since 10/29  8am      Maintain normotension. MAP >65       EKG NSR     GI/NUTR:     Diet - Regular     GI Prophylaxis- PPI     Bowel regimen- Senna       /RENAL:     L pyelonephritis     S/P L JJ ureteral stent     Aguilar in place. Monitor I&Os      BUN/Cr 22/1.0 trend     HYpokalemia, hypophosphatemia- repleted    Monitor lytes, replete as needed     IVL    Lactic acidosis resolved     HEME/ONC:     DVT prophylaxis- HSQ     Trend H/H     ID:    Vanco/Meropenum - in accordance with ID reccs     Get VANC TROUGH 30 mins prior to fourth dose (on 10/31)     Afebrile     Trend WBC 25     F/U Urine Cx    Blood Cx  with E. Coli- f/u sensitivities        ENDO:    No dx. ISS.     Glucose, Serum: 117 (10-28 @ 23:40)      LINES/DRAINS:  TYSON, Radha Cheek R IJ TLC     DISPO:    Transfer to the floor  Discussed with Dr. Alcala prior to downgrading      For Follow-Up:  Urine and blood cultures  Trend WBC  Vanco trough in am 10/31  11am BMP SICU Transfer Note    Transfer from: SICU  Transfer to: Med/ Surg floor  Endorsed to NIKKI Suarez    SICU COURSE:    64 y/o female recently discharged form [facility] with pyelonephritis presented to the ED on 10/28 with worsening LLQ and left flank pain. She had an obstructing left renal calculus and was taken to the OR emergently for cystoscopy and a double J stent. Patient had a pressor requirement and was weaned off at 8am on 10/29 after fluid resuscitation.  Her aguilar remains in place with adequate UO, diet has been advanced to regular.    Blood culture form 10/28 1 of 2 sets with E. Coli in the aerobic bottle    Vital Signs Last 24 Hrs  T(C): 36.3 (29 Oct 2020 20:00), Max: 37.2 (29 Oct 2020 07:40)  T(F): 97.3 (29 Oct 2020 20:00), Max: 99 (29 Oct 2020 07:40)  HR: 81 (30 Oct 2020 02:00) (80 - 92)  BP: 116/64 (30 Oct 2020 02:00) (98/57 - 136/62)  BP(mean): 83 (30 Oct 2020 02:00) (72 - 92)  RR: 24 (30 Oct 2020 02:00) (17 - 31)  SpO2: 97% (30 Oct 2020 02:00) (92% - 99%)  I&O's Summary    28 Oct 2020 07:01  -  29 Oct 2020 07:00  --------------------------------------------------------  IN: 1547 mL / OUT: 1095 mL / NET: 452 mL    29 Oct 2020 07:01  -  30 Oct 2020 02:51  --------------------------------------------------------  IN: 123 mL / OUT: 2990 mL / NET: -2867 mL          MEDICATIONS  (STANDING):  chlorhexidine 4% Liquid 1 Application(s) Topical <User Schedule>  heparin   Injectable 5000 Unit(s) SubCutaneous every 8 hours  meropenem  IVPB 1000 milliGRAM(s) IV Intermittent Once  pantoprazole    Tablet 40 milliGRAM(s) Oral before breakfast  potassium phosphate IVPB 30 milliMole(s) IV Intermittent once  senna 2 Tablet(s) Oral at bedtime  vancomycin  IVPB 1000 milliGRAM(s) IV Intermittent daily    MEDICATIONS  (PRN):  acetaminophen   Tablet .. 650 milliGRAM(s) Oral every 6 hours PRN Mild Pain (1 - 3)  oxyCODONE    IR 5 milliGRAM(s) Oral every 4 hours PRN Moderate Pain (4 - 6)        LABS                                            10.1                  Neurophils% (auto):   x      (10-30 @ 00:03):    13.16)-----------(171          Lymphocytes% (auto):  x                                             31.0                   Eosinphils% (auto):   x        Manual%: Neutrophils x    ; Lymphocytes x    ; Eosinophils x    ; Bands%: x    ; Blasts x                                    137    |  107    |  10                  Calcium: 8.4   / iCa: x      (10-30 @ 00:03)    ----------------------------<  106       Magnesium: 2.1                              3.5     |  23     |  0.5              Phosphorous: 2.1        ASSESSMENT & PLAN:       NEURO:    Pain-controlled with Tylenol/Oxycodone     A&O x 3     RESP:     Oxygen insufficiency-wean off NC to RA as tolerate    Maintain O2 saturation >95%     Increase activity as tolerated     IS 10x per hour       CARDS:     septic shock-off levophed since 10/29  8am      Maintain normotension. MAP >65       EKG NSR     GI/NUTR:     Diet - Regular     GI Prophylaxis- PPI     Bowel regimen- Senna       /RENAL:     L pyelonephritis     S/P L JJ ureteral stent     Aguilar in place. Monitor I&Os      BUN/Cr 22/1.0 trend     HYpokalemia, hypophosphatemia- repleted    Monitor lytes, replete as needed     IVL    Lactic acidosis resolved     HEME/ONC:     DVT prophylaxis- HSQ     Trend H/H     ID:    Vanco/Meropenum - in accordance with ID reccs     Get VANC TROUGH 30 mins prior to fourth dose (on 10/31)     Afebrile     Trend WBC 25     F/U Urine Cx    Blood Cx  with E. Coli- f/u sensitivities        ENDO:    No dx. ISS.     Glucose, Serum: 117 (10-28 @ 23:40)      LINES/DRAINS:  PIV, Aguilar    DISPO:    Transfer to the floor  Discussed with Dr. Alcala prior to downgrading      For Follow-Up:  Urine and blood cultures  Trend WBC  Vanco trough in am 10/31  11am BMP

## 2020-10-31 LAB
ANION GAP SERPL CALC-SCNC: 10 MMOL/L — SIGNIFICANT CHANGE UP (ref 7–14)
BUN SERPL-MCNC: 8 MG/DL — LOW (ref 10–20)
CALCIUM SERPL-MCNC: 8.3 MG/DL — LOW (ref 8.5–10.1)
CHLORIDE SERPL-SCNC: 108 MMOL/L — SIGNIFICANT CHANGE UP (ref 98–110)
CO2 SERPL-SCNC: 22 MMOL/L — SIGNIFICANT CHANGE UP (ref 17–32)
CREAT SERPL-MCNC: 0.6 MG/DL — LOW (ref 0.7–1.5)
GLUCOSE SERPL-MCNC: 95 MG/DL — SIGNIFICANT CHANGE UP (ref 70–99)
HCT VFR BLD CALC: 34 % — LOW (ref 37–47)
HGB BLD-MCNC: 11.2 G/DL — LOW (ref 12–16)
MCHC RBC-ENTMCNC: 29.4 PG — SIGNIFICANT CHANGE UP (ref 27–31)
MCHC RBC-ENTMCNC: 32.9 G/DL — SIGNIFICANT CHANGE UP (ref 32–37)
MCV RBC AUTO: 89.2 FL — SIGNIFICANT CHANGE UP (ref 81–99)
NRBC # BLD: 0 /100 WBCS — SIGNIFICANT CHANGE UP (ref 0–0)
PLATELET # BLD AUTO: 208 K/UL — SIGNIFICANT CHANGE UP (ref 130–400)
POTASSIUM SERPL-MCNC: 4 MMOL/L — SIGNIFICANT CHANGE UP (ref 3.5–5)
POTASSIUM SERPL-SCNC: 4 MMOL/L — SIGNIFICANT CHANGE UP (ref 3.5–5)
RBC # BLD: 3.81 M/UL — LOW (ref 4.2–5.4)
RBC # FLD: 11.5 % — SIGNIFICANT CHANGE UP (ref 11.5–14.5)
SODIUM SERPL-SCNC: 140 MMOL/L — SIGNIFICANT CHANGE UP (ref 135–146)
WBC # BLD: 5.83 K/UL — SIGNIFICANT CHANGE UP (ref 4.8–10.8)
WBC # FLD AUTO: 5.83 K/UL — SIGNIFICANT CHANGE UP (ref 4.8–10.8)

## 2020-10-31 RX ADMIN — SENNA PLUS 2 TABLET(S): 8.6 TABLET ORAL at 21:25

## 2020-10-31 RX ADMIN — HEPARIN SODIUM 5000 UNIT(S): 5000 INJECTION INTRAVENOUS; SUBCUTANEOUS at 21:25

## 2020-10-31 RX ADMIN — PANTOPRAZOLE SODIUM 40 MILLIGRAM(S): 20 TABLET, DELAYED RELEASE ORAL at 07:59

## 2020-10-31 RX ADMIN — HEPARIN SODIUM 5000 UNIT(S): 5000 INJECTION INTRAVENOUS; SUBCUTANEOUS at 13:21

## 2020-10-31 RX ADMIN — HEPARIN SODIUM 5000 UNIT(S): 5000 INJECTION INTRAVENOUS; SUBCUTANEOUS at 05:11

## 2020-10-31 NOTE — PROGRESS NOTE ADULT - SUBJECTIVE AND OBJECTIVE BOX
Progress Note    Subjective: 65y Female s/p cystoscopy left stent placement, POD #3. Pt seen and examined at bedside this morning. Pt lying in bed offering no complaints at this time. Pt was downgraded yesterday, no acute overnight events. Pt denies fever, chills, N/V, abdominal pain, flank pain, suprapubic pain, or hematuria.       [ x ] a 10 point review of systems was negative except where noted    [  ]  Due to altered mental status/intubation, subjective information was not able t be obtained from the patient.  History was obtained, to the extent possible, from review of the chart and collateral sources of information.              Vital signs  T(C): , Max: 37.6 (10-30-20 @ 21:44)  HR: 70 (10-31-20 @ 08:41)  BP: 115/64 (10-31-20 @ 08:41)  SpO2: 98% (10-30-20 @ 11:49)    Physical Exam  Gen: NAD, alert & awake, well-developed   HEENT: EOMI  Respiratory: no accessory muscle use   Abd: soft, nontender, nondistended, no suprapubic tenderness elicited   : + aguilar draining clear yellow urine  Ext: no edema   Skin: no rashes       Labs                        11.2   5.83  )-----------( 208      ( 31 Oct 2020 05:06 )             34.0     31 Oct 2020 05:06    140    |  108    |  8      ----------------------------<  95     4.0     |  22     |  0.6      Ca    8.3        31 Oct 2020 05:06  Phos  2.7       30 Oct 2020 11:02  Mg     1.8       30 Oct 2020 11:02    I&O's Detail    30 Oct 2020 07:01  -  31 Oct 2020 07:00  --------------------------------------------------------  IN:    IV PiggyBack: 166 mL    Oral Fluid: 210 mL  Total IN: 376 mL    OUT:    Indwelling Catheter - Urethral (mL): 3250 mL  Total OUT: 3250 mL    Total NET: -2874 mL      Culture - Urine (10.28.20 @ 23:00)   Specimen Source: .Urine None   Culture Results:   <10,000 CFU/mL Normal Urogenital Honey   Culture - Urine (10.28.20 @ 16:05)   Specimen Source: .Urine Clean Catch (Midstream)   Culture Results:   <10,000 CFU/mL Normal Urogenital Honey

## 2020-10-31 NOTE — PROGRESS NOTE ADULT - ASSESSMENT
65y Female s/p cystoscopy left stent placement, POD #3 - pt overall improving     Plan  ·	WBC downtrending 13.16 >> 5.83  ·	d/c'd vancomycin, continue IV meropenum, will follow up with ID for further recommendations   ·	urine cx negative   ·	encourage PO fluids   ·	cont to monitor I's&O's     ·	cont to monitor VS  ·	will discuss with attending

## 2020-10-31 NOTE — PROGRESS NOTE ADULT - ATTENDING COMMENTS
Covering Dr. Salgado  Pt was seen and examined.   Agree with above  F/U labs and UO  Cr normalized, may remove aguilar and monitor UO

## 2020-11-01 RX ORDER — CEFTRIAXONE 500 MG/1
1000 INJECTION, POWDER, FOR SOLUTION INTRAMUSCULAR; INTRAVENOUS EVERY 24 HOURS
Refills: 0 | Status: DISCONTINUED | OUTPATIENT
Start: 2020-11-01 | End: 2020-11-03

## 2020-11-01 RX ADMIN — PANTOPRAZOLE SODIUM 40 MILLIGRAM(S): 20 TABLET, DELAYED RELEASE ORAL at 06:59

## 2020-11-01 RX ADMIN — HEPARIN SODIUM 5000 UNIT(S): 5000 INJECTION INTRAVENOUS; SUBCUTANEOUS at 05:18

## 2020-11-01 RX ADMIN — HEPARIN SODIUM 5000 UNIT(S): 5000 INJECTION INTRAVENOUS; SUBCUTANEOUS at 21:11

## 2020-11-01 RX ADMIN — HEPARIN SODIUM 5000 UNIT(S): 5000 INJECTION INTRAVENOUS; SUBCUTANEOUS at 14:12

## 2020-11-01 RX ADMIN — CEFTRIAXONE 100 MILLIGRAM(S): 500 INJECTION, POWDER, FOR SOLUTION INTRAMUSCULAR; INTRAVENOUS at 14:35

## 2020-11-01 RX ADMIN — SENNA PLUS 2 TABLET(S): 8.6 TABLET ORAL at 21:12

## 2020-11-01 NOTE — PROGRESS NOTE ADULT - ASSESSMENT
Patient is a 64 yo F a/w sepsis and left mild hydroureteronephrosis 2/2 4.5x6.3x5.8mm L UVJ calculus s/p cystoscopy left stent placement, POD #4 - patient doing well    Plan  - Pt voiding freely without difficulty  - F/u blood culture susceptibilities for PO abx  - D/C home pending susceptibilities  - Continue to monitor VS  - Pt will follow up with urology after DC Patient is a 64 yo F a/w sepsis and left mild hydroureteronephrosis 2/2 4.5x6.3x5.8mm L UVJ calculus s/p cystoscopy left stent placement, POD #4 - patient doing well    Plan  - Pt voiding freely without difficulty  - Pt not received IV carri, will start IV Ceftriaxone 1g q 24hrs per ID  - F/u blood culture susceptibilities for PO abx  - D/C home pending susceptibilities  - Continue to monitor VS  - Pt will follow up with urology after DC

## 2020-11-01 NOTE — PROGRESS NOTE ADULT - SUBJECTIVE AND OBJECTIVE BOX
Interval HPI: Patient is a 66 yo F a/w sepsis and left mild hydroureteronephrosis 2/2 4.5x6.3x5.8mm L UVJ calculus s/p cystoscopy left stent placement, POD #4. Pt seen and examined at bedside this morning. Pt sitting up in chair and eating breakfast. Denies complaints at this time.  Pt denies fever, chills, N/V, abdominal pain, flank pain, suprapubic pain, or hematuria.     Vital Signs Last 24 Hrs  T(C): 36.1 (01 Nov 2020 08:34), Max: 37.7 (31 Oct 2020 17:00)  T(F): 97 (01 Nov 2020 08:34), Max: 99.9 (31 Oct 2020 17:00)  HR: 84 (01 Nov 2020 08:34) (72 - 84)  BP: 109/60 (01 Nov 2020 08:34) (109/60 - 132/61)  RR: 18 (01 Nov 2020 08:34) (18 - 18)    REVIEW OF SYSTEMS:  [ x ] A 10 Point Review of Systems was negative except where noted  [    ] Due to altered mental status/intubation, subjective information was not able to be obtained from the patient. History was obtained to the extent possible from review of the chart and collateral sources of information.     PHYSICAL EXAM:  Constitutional: NAD, well-developed, awake/alert  Skin: Good color, non diaphoretic  HEENT: NCAT, EOMI  Back: No CVA tenderness B/L  Respiratory: No accessory respiratory muscle use  Abd: Soft, NT/ND, bladder non palpable, no suprapubic tenderness  : Voiding freely    I&O's Summary  31 Oct 2020 08:01  -  01 Nov 2020 07:00  --------------------------------------------------------  IN: 0 mL / OUT: 1850 mL / NET: -1850 mL    LABS:             11.2   5.83  )-----------( 208      ( 31 Oct 2020 05:06 )             34.0     10-31  140  |  108  |  8<L>  ----------------------------<  95  4.0   |  22  |  0.6<L>    Ca    8.3<L>      31 Oct 2020 05:06

## 2020-11-01 NOTE — PROGRESS NOTE ADULT - ASSESSMENT
ASSESSMENT  64 yo F nephrolithiasis s/p laser lithotripsy 8 years ago, was seen here by ED on 10/19 and was diagnosed with left pyelonephritis and sent home on vantin, now presents to the ED with L flank & LLQ abdominal pain for the past week.     IMPRESSION  #Severe sepsis due to Ecoli bacteremia from L Pyelonephritis with obstructing stone    10/28 BCX GNR    10/28 s/p cystoscopy left ureteral stent insertion, purulent urine    UA      10/19 UCX contaminant    CTAP Mild left hydroureteronephrosis extending to the level of an obstructing irregularly shaped 4.5 x 6.3 x 5.8  mm ureteral calculus at the left UVJ.    Nonobstructing left renal calculi measuring up to 1 cm as above.   #Severe Sepsis on admission T>101F, Pulse>90, WBC>12, lactic acidosis  #Lactic acidosis  #CT bibasilar atelectasis.      RECOMMENDATIONS  - Kenney 1g q8h IV, f/u ecoli sensitivities and narrow when possible  - called micro - having some issues performing sensitivities     If any questions, please call or send a message on [employer] Teams  Spectra [phone]     ASSESSMENT  64 yo F nephrolithiasis s/p laser lithotripsy 8 years ago, was seen here by ED on 10/19 and was diagnosed with left pyelonephritis and sent home on vantin, now presents to the ED with L flank & LLQ abdominal pain for the past week.     IMPRESSION  #Severe sepsis due to Ecoli bacteremia from L Pyelonephritis with obstructing stone    10/28 BCX GNR    10/28 s/p cystoscopy left ureteral stent insertion, purulent urine    UA      10/19 UCX contaminant    CTAP Mild left hydroureteronephrosis extending to the level of an obstructing irregularly shaped 4.5 x 6.3 x 5.8  mm ureteral calculus at the left UVJ.    Nonobstructing left renal calculi measuring up to 1 cm as above.   #Severe Sepsis on admission T>101F, Pulse>90, WBC>12, lactic acidosis  #Lactic acidosis  #CT bibasilar atelectasis.      RECOMMENDATIONS  - Ceftriaxone 1g q24h IV (never got carri)  - called micro - having some issues performing sensitivities     If any questions, please call or send a message on [employer] Teams  Spectra [phone]     ASSESSMENT  64 yo F nephrolithiasis s/p laser lithotripsy 8 years ago, was seen here by ED on 10/19 and was diagnosed with left pyelonephritis and sent home on vantin, now presents to the ED with L flank & LLQ abdominal pain for the past week.     IMPRESSION  #Severe sepsis due to Ecoli bacteremia from L Pyelonephritis with obstructing stone    10/28 BCX GNR    10/28 s/p cystoscopy left ureteral stent insertion, purulent urine    UA      10/19 UCX contaminant    CTAP Mild left hydroureteronephrosis extending to the level of an obstructing irregularly shaped 4.5 x 6.3 x 5.8  mm ureteral calculus at the left UVJ.    Nonobstructing left renal calculi measuring up to 1 cm as above.   #Severe Sepsis on admission T>101F, Pulse>90, WBC>12, lactic acidosis  #Lactic acidosis  #CT bibasilar atelectasis.      RECOMMENDATIONS  - Ceftriaxone 1g q24h IV (never got carri)  - called micro - having some issues performing sensitivities   - If S can d/c on PO cipro/bactrim x 7 more days    If any questions, please call or send a message on [employer] Teams  Spectra [phone]

## 2020-11-01 NOTE — PROGRESS NOTE ADULT - SUBJECTIVE AND OBJECTIVE BOX
BEBA HAND  65y, Female  Allergy: No Known Allergies      LOS  4d    CHIEF COMPLAINT: septic kidney stone (01 Nov 2020 11:42)      INTERVAL EVENTS/HPI  - No acute events overnight, called micro - having some issues performing sensitivities   - T(F): , Max: 99.9 (10-31-20 @ 17:00)  - Denies any worsening symptoms  - Tolerating medication  - WBC Count: 5.83 (10-31-20 @ 05:06)  WBC Count: 13.16 (10-30-20 @ 00:03)  - Creatinine, Serum: 0.6 (10-31-20 @ 05:06)       ROS  General: Denies rigors, nightsweats  HEENT: Denies headache, rhinorrhea, sore throat, eye pain  CV: Denies CP, palpitations  PULM: Denies wheezing, hemoptysis  GI: Denies hematemesis, hematochezia, melena  : Denies discharge, hematuria  MSK: Denies arthralgias, myalgias  SKIN: Denies rash, lesions  NEURO: Denies paresthesias, weakness  PSYCH: Denies depression, anxiety    VITALS:  T(F): 97, Max: 99.9 (10-31-20 @ 17:00)  HR: 84  BP: 109/60  RR: 18Vital Signs Last 24 Hrs  T(C): 36.1 (01 Nov 2020 08:34), Max: 37.7 (31 Oct 2020 17:00)  T(F): 97 (01 Nov 2020 08:34), Max: 99.9 (31 Oct 2020 17:00)  HR: 84 (01 Nov 2020 08:34) (72 - 84)  BP: 109/60 (01 Nov 2020 08:34) (109/60 - 132/61)  BP(mean): --  RR: 18 (01 Nov 2020 08:34) (18 - 18)  SpO2: --    PHYSICAL EXAM:  Gen: NAD, resting in bed  HEENT: Normocephalic, atraumatic  Neck: supple, no lymphadenopathy  CV: Regular rate & regular rhythm  Lungs: decreased BS at bases, no fremitus  Abdomen: Soft, BS present  Ext: Warm, well perfused  Neuro: non focal, awake  Skin: no rash, no erythema  Lines: no phlebitis    FH: Non-contributory  Social Hx: Non-contributory    TESTS & MEASUREMENTS:                        11.2   5.83  )-----------( 208      ( 31 Oct 2020 05:06 )             34.0     10-31    140  |  108  |  8<L>  ----------------------------<  95  4.0   |  22  |  0.6<L>    Ca    8.3<L>      31 Oct 2020 05:06              Culture - Urine (collected 10-28-20 @ 23:00)  Source: .Urine None  Final Report (10-30-20 @ 06:49):    <10,000 CFU/mL Normal Urogenital Honey    Culture - Urine (collected 10-28-20 @ 16:05)  Source: .Urine Clean Catch (Midstream)  Final Report (10-30-20 @ 08:05):    <10,000 CFU/mL Normal Urogenital Honey    Culture - Blood (collected 10-28-20 @ 10:38)  Source: .Blood Blood  Gram Stain (10-29-20 @ 13:32):    Growth in aerobic bottle: Gram Negative Rods  Preliminary Report (10-30-20 @ 11:50):    Growth in aerobic bottle: Escherichia coli    Susceptibility to follow.    "Due to technical problems, Proteus sp. will Not be reported as part of    the BCID panel until further notice"    ***Blood Panel PCR results on this specimen are available    approximately 3 hours after the Gram stain result.***    Gram stain, PCR, and/or culture results may not always    correspond due to difference in methodologies.    ************************************************************    This PCR assay was performed using [employer].    The following targets are tested for: Enterococcus,    vancomycin resistant enterococci, Listeria monocytogenes,    coagulase negative staphylococci, S. aureus,    methicillin resistant S. aureus, Streptococcus agalactiae    (Group B), S. pneumoniae, S. pyogenes (Group A),    Acinetobacter baumannii, Enterobacter cloacae, E. coli,    Klebsiella oxytoca, K. pneumoniae, Proteus sp.,    Serratia marcescens, Haemophilus influenzae,    Neisseria meningitidis, Pseudomonas aeruginosa, Candida    albicans, C. glabrata, C krusei, C parapsilosis,    C. tropicalis and the KPC resistance gene.  Organism: Blood Culture PCR (10-29-20 @ 15:05)  Organism: Blood Culture PCR (10-29-20 @ 15:05)      -  Escherichia coli: Detec      Method Type: PCR    Culture - Blood (collected 10-28-20 @ 10:38)  Source: .Blood Blood  Preliminary Report (10-29-20 @ 18:02):    No growth to date.    Culture - Urine (collected 10-19-20 @ 13:58)  Source: .Urine Clean Catch (Midstream)  Final Report (10-20-20 @ 20:35):    >=3 organisms. Probable collection contamination.        Lactate, Blood: 1.7 mmol/L (10-28-20 @ 23:40)  Blood Gas Venous - Lactate: 1.7 mmoL/L (10-28-20 @ 12:43)  Lactate, Blood: 3.2 mmol/L (10-28-20 @ 10:38)      INFECTIOUS DISEASES TESTING  Rapid RVP Result: NotDetec (10-28-20 @ 20:54)      INFLAMMATORY MARKERS      RADIOLOGY & ADDITIONAL TESTS:  I have personally reviewed the last available Chest xray  CXR      CT      CARDIOLOGY TESTING  12 Lead ECG:   Systolic  mmHg    Diastolic BP 74 mmHg    Ventricular Rate 83 BPM    Atrial Rate 83 BPM    P-R Interval 134 ms    QRS Duration 94 ms    Q-T Interval 394 ms    QTC Calculation(Bazett) 462 ms    P Axis 42 degrees    R Axis 34 degrees    T Axis 54 degrees    Diagnosis Line Normal sinus rhythm  Nonspecific ST abnormality  Abnormal ECG    Confirmed by RUSSELL WOMACK MD ([id number]) on 10/29/2020 9:28:52 PM (10-28-20 @ 23:17)  12 Lead ECG:   Systolic  mmHg    Diastolic BP 74 mmHg    Ventricular Rate 82 BPM    Atrial Rate 82 BPM    P-R Interval 142 ms    QRS Duration 96 ms    Q-T Interval 396 ms    QTC Calculation(Bazett) 462 ms    P Axis 41 degrees    R Axis 34 degrees    T Axis 55 degrees    Diagnosis Line Normal sinus rhythm  Nonspecific ST abnormality  Abnormal ECG    Confirmed by Dereck Andre ([id number]) on 10/29/2020 7:14:26 AM (10-28-20 @ 23:17)      MEDICATIONS  chlorhexidine 4% Liquid 1 Topical <User Schedule>  heparin   Injectable 5000 SubCutaneous every 8 hours  meropenem  IVPB 1000 IV Intermittent Once  pantoprazole    Tablet 40 Oral before breakfast  senna 2 Oral at bedtime      WEIGHT  Weight (kg): 74.1 (10-30-20 @ 09:18)      ANTIBIOTICS:  meropenem  IVPB 1000 milliGRAM(s) IV Intermittent Once      All available historical records have been reviewed

## 2020-11-02 LAB
ANION GAP SERPL CALC-SCNC: 9 MMOL/L — SIGNIFICANT CHANGE UP (ref 7–14)
BUN SERPL-MCNC: 13 MG/DL — SIGNIFICANT CHANGE UP (ref 10–20)
CALCIUM SERPL-MCNC: 9 MG/DL — SIGNIFICANT CHANGE UP (ref 8.5–10.1)
CHLORIDE SERPL-SCNC: 106 MMOL/L — SIGNIFICANT CHANGE UP (ref 98–110)
CO2 SERPL-SCNC: 26 MMOL/L — SIGNIFICANT CHANGE UP (ref 17–32)
CREAT SERPL-MCNC: 0.6 MG/DL — LOW (ref 0.7–1.5)
CULTURE RESULTS: SIGNIFICANT CHANGE UP
GLUCOSE SERPL-MCNC: 104 MG/DL — HIGH (ref 70–99)
HCT VFR BLD CALC: 36.8 % — LOW (ref 37–47)
HGB BLD-MCNC: 12.2 G/DL — SIGNIFICANT CHANGE UP (ref 12–16)
MCHC RBC-ENTMCNC: 29.6 PG — SIGNIFICANT CHANGE UP (ref 27–31)
MCHC RBC-ENTMCNC: 33.2 G/DL — SIGNIFICANT CHANGE UP (ref 32–37)
MCV RBC AUTO: 89.3 FL — SIGNIFICANT CHANGE UP (ref 81–99)
NRBC # BLD: 0 /100 WBCS — SIGNIFICANT CHANGE UP (ref 0–0)
PLATELET # BLD AUTO: 296 K/UL — SIGNIFICANT CHANGE UP (ref 130–400)
POTASSIUM SERPL-MCNC: 4 MMOL/L — SIGNIFICANT CHANGE UP (ref 3.5–5)
POTASSIUM SERPL-SCNC: 4 MMOL/L — SIGNIFICANT CHANGE UP (ref 3.5–5)
RBC # BLD: 4.12 M/UL — LOW (ref 4.2–5.4)
RBC # FLD: 11.5 % — SIGNIFICANT CHANGE UP (ref 11.5–14.5)
SODIUM SERPL-SCNC: 141 MMOL/L — SIGNIFICANT CHANGE UP (ref 135–146)
SPECIMEN SOURCE: SIGNIFICANT CHANGE UP
WBC # BLD: 5.69 K/UL — SIGNIFICANT CHANGE UP (ref 4.8–10.8)
WBC # FLD AUTO: 5.69 K/UL — SIGNIFICANT CHANGE UP (ref 4.8–10.8)

## 2020-11-02 PROCEDURE — 99232 SBSQ HOSP IP/OBS MODERATE 35: CPT

## 2020-11-02 RX ADMIN — HEPARIN SODIUM 5000 UNIT(S): 5000 INJECTION INTRAVENOUS; SUBCUTANEOUS at 05:44

## 2020-11-02 RX ADMIN — SENNA PLUS 2 TABLET(S): 8.6 TABLET ORAL at 21:03

## 2020-11-02 RX ADMIN — PANTOPRAZOLE SODIUM 40 MILLIGRAM(S): 20 TABLET, DELAYED RELEASE ORAL at 06:08

## 2020-11-02 RX ADMIN — HEPARIN SODIUM 5000 UNIT(S): 5000 INJECTION INTRAVENOUS; SUBCUTANEOUS at 21:03

## 2020-11-02 RX ADMIN — CEFTRIAXONE 100 MILLIGRAM(S): 500 INJECTION, POWDER, FOR SOLUTION INTRAMUSCULAR; INTRAVENOUS at 13:04

## 2020-11-02 RX ADMIN — HEPARIN SODIUM 5000 UNIT(S): 5000 INJECTION INTRAVENOUS; SUBCUTANEOUS at 13:05

## 2020-11-02 NOTE — PROGRESS NOTE ADULT - ASSESSMENT
66 y/o f s/p left jj stent, + blood cx with E. Coli     A) resolving sepsis  E coli blood cx    P) awaiting cx sensitivities  will d/c when culture results are final  pt is aware  will d/w attending

## 2020-11-02 NOTE — PROGRESS NOTE ADULT - SUBJECTIVE AND OBJECTIVE BOX
Progress Note    Subjective Pt seen and examined at bedside   66 y/o Female s/p left JJ stent for obstructing stone, pt with + blood culture awaiting sensitivities.  Pt doing better wishes to go home.     [x] a 10 point review of systems was negative except where noted    [  ]  Due to altered mental status/intubation, subjective information was not able t be obtained from the patient.  History was obtained, to the extent possible, from review of the chart and collateral sources of information.    Vital signs  T(C): , Max: 36.1 (11-02-20 @ 05:02)  HR: 77 (11-02-20 @ 05:02)  BP: 102/53 (11-02-20 @ 05:02)      Gen NC/AT in NAD  SKIN warm, dry with good tugor  Neck supple, FROM  LUNGS No dyspnea, No accessory muscle use  BACK No CVAT  ABD    Soft non tender, bladder not palpable   voiding freely      Labs                        12.2   5.69  )-----------( 296      ( 02 Nov 2020 06:17 )             36.8     02 Nov 2020 06:17    141    |  106    |  13     ----------------------------<  104    4.0     |  26     |  0.6      Ca    9.0        02 Nov 2020 06:17        Culture - Blood (10.28.20 @ 10:38)   Gram Stain:   Growth in aerobic bottle: Gram Negative Rods   - Escherichia coli: Detec   Specimen Source: .Blood Blood   Organism: Blood Culture PCR   Culture Results:   Growth in aerobic bottle: Escherichia coli   Susceptibility to follow.   "Due to technical problems, Proteus sp. will Not be reported as part of   the BCID panel until further notice"   ***Blood Panel PCR results on this specimen are available   approximately 3 hours after the Gram stain result.***   Gram stain, PCR, and/or culture results may not always   correspond due to difference in methodologies.   ************************************************************   This PCR assay was performed using [employer].   The following targets are tested for: Enterococcus,   vancomycin resistant enterococci, Listeria monocytogenes,   coagulase negative staphylococci, S. aureus,   methicillin resistant S. aureus, Streptococcus agalactiae   (Group B), S. pneumoniae, S. pyogenes (Group A),   Acinetobacter baumannii, Enterobacter cloacae, E. coli,   Klebsiella oxytoca, K. pneumoniae, Proteus sp.,   Serratia marcescens, Haemophilus influenzae,   Neisseria meningitidis, Pseudomonas aeruginosa, Candida   albicans, C. glabrata, C krusei, C parapsilosis,   C. tropicalis and the KPC resistance gene.   Organism Identification: Blood Culture PCR   Method Type: PCR       Historical Values  Culture - Blood (10.28.20 @ 10:38)   Gram Stain:   Growth in aerobic bottle: Gram Negative Rods   - Escherichia coli: Detec   Specimen Source: .Blood Blood   Organism: Blood Culture PCR   Culture Results:   Growth in aerobic bottle: Escherichia coli   Susceptibility to follow.   "Due to technical problems, Proteus sp. will Not be reported as part of   the BCID panel until further notice"   ***Blood Panel PCR results on this specimen are available   approximately 3 hours after the Gram stain result.***   Gram stain, PCR, and/or culture results may not always   correspond due to difference in methodologies.   ************************************************************   This PCR assay was performed using [employer].   The following targets are tested for: Enterococcus,   vancomycin resistant enterococci, Listeria monocytogenes,   coagulase negative staphylococci, S. aureus,   methicillin resistant S. aureus, Streptococcus agalactiae   (Group B), S. pneumoniae, S. pyogenes (Group A),   Acinetobacter baumannii, Enterobacter cloacae, E. coli,   Klebsiella oxytoca, K. pneumoniae, Proteus sp.,   Serratia marcescens, Haemophilus influenzae,   Neisseria meningitidis, Pseudomonas aeruginosa, Candida   albicans, C. glabrata, C krusei, C parapsilosis,   C. tropicalis and the KPC resistance gene.   Organism Identification: Blood Culture PCR   Method Type: PCR   Culture - Blood (10.28.20 @ 10:38)   Specimen Source: .Blood Blood   Culture Results:   No growth to date.

## 2020-11-03 ENCOUNTER — TRANSCRIPTION ENCOUNTER (OUTPATIENT)
Age: 65
End: 2020-11-03

## 2020-11-03 VITALS
DIASTOLIC BLOOD PRESSURE: 56 MMHG | TEMPERATURE: 97 F | RESPIRATION RATE: 18 BRPM | SYSTOLIC BLOOD PRESSURE: 118 MMHG | HEART RATE: 82 BPM

## 2020-11-03 PROCEDURE — 99232 SBSQ HOSP IP/OBS MODERATE 35: CPT

## 2020-11-03 RX ORDER — MOXIFLOXACIN HYDROCHLORIDE TABLETS, 400 MG 400 MG/1
1 TABLET, FILM COATED ORAL
Qty: 14 | Refills: 0
Start: 2020-11-03 | End: 2020-11-09

## 2020-11-03 RX ADMIN — HEPARIN SODIUM 5000 UNIT(S): 5000 INJECTION INTRAVENOUS; SUBCUTANEOUS at 13:45

## 2020-11-03 RX ADMIN — CEFTRIAXONE 100 MILLIGRAM(S): 500 INJECTION, POWDER, FOR SOLUTION INTRAMUSCULAR; INTRAVENOUS at 13:42

## 2020-11-03 RX ADMIN — CHLORHEXIDINE GLUCONATE 1 APPLICATION(S): 213 SOLUTION TOPICAL at 05:21

## 2020-11-03 RX ADMIN — HEPARIN SODIUM 5000 UNIT(S): 5000 INJECTION INTRAVENOUS; SUBCUTANEOUS at 05:20

## 2020-11-03 RX ADMIN — PANTOPRAZOLE SODIUM 40 MILLIGRAM(S): 20 TABLET, DELAYED RELEASE ORAL at 05:20

## 2020-11-03 NOTE — DISCHARGE NOTE NURSING/CASE MANAGEMENT/SOCIAL WORK - PATIENT PORTAL LINK FT
You can access the FollowMyHealth Patient Portal offered by [facility] by registering at the following website: [email or web address]. By joining [employer]’s FollowMyHealth portal, you will also be able to view your health information using other applications (apps) compatible with our system.

## 2020-11-03 NOTE — DISCHARGE NOTE PROVIDER - CARE PROVIDERS DIRECT ADDRESSES
[email or web address]

## 2020-11-03 NOTE — PROGRESS NOTE ADULT - SUBJECTIVE AND OBJECTIVE BOX
BEBA HAND  65y, Female  Allergy: No Known Allergies      LOS  6d    CHIEF COMPLAINT: septic kidney stone (03 Nov 2020 10:09)      INTERVAL EVENTS/HPI  - No acute events overnight. called micro- still having issues performing sensitivities with standard techniques  - T(F): , Max: 98 (11-02-20 @ 12:33)  - Denies any worsening symptoms  - Tolerating medication  - WBC Count: 5.69 (11-02-20 @ 06:17)  - Creatinine, Serum: 0.6 (11-02-20 @ 06:17)       ROS  General: Denies rigors, nightsweats  HEENT: Denies headache, rhinorrhea, sore throat, eye pain  CV: Denies CP, palpitations  PULM: Denies wheezing, hemoptysis  GI: Denies hematemesis, hematochezia, melena  : Denies discharge, hematuria  MSK: Denies arthralgias, myalgias  SKIN: Denies rash, lesions  NEURO: Denies paresthesias, weakness  PSYCH: Denies depression, anxiety    VITALS:  T(F): 96.8, Max: 98 (11-02-20 @ 12:33)  HR: 82  BP: 118/56  RR: 18Vital Signs Last 24 Hrs  T(C): 36 (03 Nov 2020 05:11), Max: 36.7 (02 Nov 2020 12:33)  T(F): 96.8 (03 Nov 2020 05:11), Max: 98 (02 Nov 2020 12:33)  HR: 82 (03 Nov 2020 05:11) (79 - 82)  BP: 118/56 (03 Nov 2020 05:11) (118/56 - 119/59)  BP(mean): --  RR: 18 (03 Nov 2020 05:11) (16 - 18)  SpO2: --    PHYSICAL EXAM:  Gen: NAD, resting in bed  HEENT: Normocephalic, atraumatic  Neck: supple, no lymphadenopathy  CV: Regular rate & regular rhythm  Lungs: decreased BS at bases, no fremitus  Abdomen: Soft, BS present  Ext: Warm, well perfused  Neuro: non focal, awake  Skin: no rash, no erythema  Lines: no phlebitis    FH: Non-contributory  Social Hx: Non-contributory    TESTS & MEASUREMENTS:                        12.2   5.69  )-----------( 296      ( 02 Nov 2020 06:17 )             36.8     11-02    141  |  106  |  13  ----------------------------<  104<H>  4.0   |  26  |  0.6<L>    Ca    9.0      02 Nov 2020 06:17              Culture - Urine (collected 10-28-20 @ 23:00)  Source: .Urine None  Final Report (10-30-20 @ 06:49):    <10,000 CFU/mL Normal Urogenital Honey    Culture - Urine (collected 10-28-20 @ 16:05)  Source: .Urine Clean Catch (Midstream)  Final Report (10-30-20 @ 08:05):    <10,000 CFU/mL Normal Urogenital Honey    Culture - Blood (collected 10-28-20 @ 10:38)  Source: .Blood Blood  Gram Stain (10-29-20 @ 13:32):    Growth in aerobic bottle: Gram Negative Rods  Preliminary Report (10-30-20 @ 11:50):    Growth in aerobic bottle: Escherichia coli    Susceptibility to follow.    "Due to technical problems, Proteus sp. will Not be reported as part of    the BCID panel until further notice"    ***Blood Panel PCR results on this specimen are available    approximately 3 hours after the Gram stain result.***    Gram stain, PCR, and/or culture results may not always    correspond due to difference in methodologies.    ************************************************************    This PCR assay was performed using [employer].    The following targets are tested for: Enterococcus,    vancomycin resistant enterococci, Listeria monocytogenes,    coagulase negative staphylococci, S. aureus,    methicillin resistant S. aureus, Streptococcus agalactiae    (Group B), S. pneumoniae, S. pyogenes (Group A),    Acinetobacter baumannii, Enterobacter cloacae, E. coli,    Klebsiella oxytoca, K. pneumoniae, Proteus sp.,    Serratia marcescens, Haemophilus influenzae,    Neisseria meningitidis, Pseudomonas aeruginosa, Candida    albicans, C. glabrata, C krusei, C parapsilosis,    C. tropicalis and the KPC resistance gene.  Organism: Blood Culture PCR (10-29-20 @ 15:05)  Organism: Blood Culture PCR (10-29-20 @ 15:05)      -  Escherichia coli: Detec      Method Type: PCR    Culture - Blood (collected 10-28-20 @ 10:38)  Source: .Blood Blood  Final Report (11-02-20 @ 17:01):    No Growth Final    Culture - Urine (collected 10-19-20 @ 13:58)  Source: .Urine Clean Catch (Midstream)  Final Report (10-20-20 @ 20:35):    >=3 organisms. Probable collection contamination.            INFECTIOUS DISEASES TESTING  Rapid RVP Result: NotDetec (10-28-20 @ 20:54)      INFLAMMATORY MARKERS      RADIOLOGY & ADDITIONAL TESTS:  I have personally reviewed the last available Chest xray  CXR      CT      CARDIOLOGY TESTING  12 Lead ECG:   Systolic  mmHg    Diastolic BP 74 mmHg    Ventricular Rate 83 BPM    Atrial Rate 83 BPM    P-R Interval 134 ms    QRS Duration 94 ms    Q-T Interval 394 ms    QTC Calculation(Bazett) 462 ms    P Axis 42 degrees    R Axis 34 degrees    T Axis 54 degrees    Diagnosis Line Normal sinus rhythm  Nonspecific ST abnormality  Abnormal ECG    Confirmed by RUSSELL WOMACK MD ([id number]) on 10/29/2020 9:28:52 PM (10-28-20 @ 23:17)  12 Lead ECG:   Systolic  mmHg    Diastolic BP 74 mmHg    Ventricular Rate 82 BPM    Atrial Rate 82 BPM    P-R Interval 142 ms    QRS Duration 96 ms    Q-T Interval 396 ms    QTC Calculation(Bazett) 462 ms    P Axis 41 degrees    R Axis 34 degrees    T Axis 55 degrees    Diagnosis Line Normal sinus rhythm  Nonspecific ST abnormality  Abnormal ECG    Confirmed by Dereck Andre ([id number]) on 10/29/2020 7:14:26 AM (10-28-20 @ 23:17)      MEDICATIONS  cefTRIAXone   IVPB 1000 IV Intermittent every 24 hours  chlorhexidine 4% Liquid 1 Topical <User Schedule>  heparin   Injectable 5000 SubCutaneous every 8 hours  meropenem  IVPB 1000 IV Intermittent Once  pantoprazole    Tablet 40 Oral before breakfast  senna 2 Oral at bedtime      WEIGHT  Weight (kg): 74.1 (10-30-20 @ 09:18)      ANTIBIOTICS:  cefTRIAXone   IVPB 1000 milliGRAM(s) IV Intermittent every 24 hours  meropenem  IVPB 1000 milliGRAM(s) IV Intermittent Once      All available historical records have been reviewed

## 2020-11-03 NOTE — PROGRESS NOTE ADULT - SUBJECTIVE AND OBJECTIVE BOX
HPI:  64 y/o female s/p L JJ stent placement for obstructing stone POD #5. Awaiting blood culture sensitivities. Pt doing well without any acute issues and awaiting disharge. Denies fevers, chills, N/V.    PAST MEDICAL & SURGICAL HISTORY:  No pertinent past medical history    History of hysterectomy    History of cholecystectomy    REVIEW OF SYSTEMS   [x] A ten-point review of systems was otherwise negative except as noted.  [ ] Due to altered mental status/intubation, subjective information were not able to be obtained from patient. History was obtained, to the extent possible, from review of the chart and collateral sources of information.    MEDICATIONS  (STANDING):  cefTRIAXone   IVPB 1000 milliGRAM(s) IV Intermittent every 24 hours  chlorhexidine 4% Liquid 1 Application(s) Topical <User Schedule>  heparin   Injectable 5000 Unit(s) SubCutaneous every 8 hours  meropenem  IVPB 1000 milliGRAM(s) IV Intermittent Once  pantoprazole    Tablet 40 milliGRAM(s) Oral before breakfast  senna 2 Tablet(s) Oral at bedtime    MEDICATIONS  (PRN):  acetaminophen   Tablet .. 650 milliGRAM(s) Oral every 6 hours PRN Mild Pain (1 - 3)  oxyCODONE    IR 5 milliGRAM(s) Oral every 4 hours PRN Moderate Pain (4 - 6)    Allergies: No Known Allergies    Vital Signs Last 24 Hrs  T(C): 36 (03 Nov 2020 05:11), Max: 36.7 (02 Nov 2020 12:33)  T(F): 96.8 (03 Nov 2020 05:11), Max: 98 (02 Nov 2020 12:33)  HR: 82 (03 Nov 2020 05:11) (79 - 82)  BP: 118/56 (03 Nov 2020 05:11) (118/56 - 119/59)  BP(mean): --  RR: 18 (03 Nov 2020 05:11) (16 - 18)  SpO2: --    PHYSICAL EXAM:    Gen NC/AT in NAD  SKIN warm, dry with good tugor  Neck supple, FROM  LUNGS No dyspnea, No accessory muscle use  BACK No CVAT  ABD    Soft non tender, bladder not palpable   voiding freely    I&O's Summary    02 Nov 2020 07:01  -  03 Nov 2020 07:00  --------------------------------------------------------  IN: 450 mL / OUT: 1475 mL / NET: -1025 mL    LABS:                        12.2   5.69  )-----------( 296      ( 02 Nov 2020 06:17 )             36.8     11-02    141  |  106  |  13  ----------------------------<  104<H>  4.0   |  26  |  0.6<L>    Ca    9.0      02 Nov 2020 06:17    Culture - Blood (10.28.20 @ 10:38)   Gram Stain:   Growth in aerobic bottle: Gram Negative Rods   - Escherichia coli: Detec   Specimen Source: .Blood Blood   Organism: Blood Culture PCR   Culture Results:   Growth in aerobic bottle: Escherichia coli   Susceptibility to follow.   "Due to technical problems, Proteus sp. will Not be reported as part of   the BCID panel until further notice"   ***Blood Panel PCR results on this specimen are available   approximately 3 hours after the Gram stain result.***   Gram stain, PCR, and/or culture results may not always   correspond due to difference in methodologies.   ************************************************************   This PCR assay was performed using [employer].   The following targets are tested for: Enterococcus,   vancomycin resistant enterococci, Listeria monocytogenes,   coagulase negative staphylococci, S. aureus,   methicillin resistant S. aureus, Streptococcus agalactiae   (Group B), S. pneumoniae, S. pyogenes (Group A),   Acinetobacter baumannii, Enterobacter cloacae, E. coli,   Klebsiella oxytoca, K. pneumoniae, Proteus sp.,   Serratia marcescens, Haemophilus influenzae,   Neisseria meningitidis, Pseudomonas aeruginosa, Candida   albicans, C. glabrata, C krusei, C parapsilosis,   C. tropicalis and the KPC resistance gene.   Organism Identification: Blood Culture PCR   Method Type: PCR       Historical Values  Culture - Blood (10.28.20 @ 10:38)   Gram Stain:   Growth in aerobic bottle: Gram Negative Rods   - Escherichia coli: Detec   Specimen Source: .Blood Blood   Organism: Blood Culture PCR   Culture Results:   Growth in aerobic bottle: Escherichia coli   Susceptibility to follow.   "Due to technical problems, Proteus sp. will Not be reported as part of   the BCID panel until further notice"   ***Blood Panel PCR results on this specimen are available   approximately 3 hours after the Gram stain result.***   Gram stain, PCR, and/or culture results may not always   correspond due to difference in methodologies.   ************************************************************   This PCR assay was performed using [employer].   The following targets are tested for: Enterococcus,   vancomycin resistant enterococci, Listeria monocytogenes,   coagulase negative staphylococci, S. aureus,   methicillin resistant S. aureus, Streptococcus agalactiae   (Group B), S. pneumoniae, S. pyogenes (Group A),   Acinetobacter baumannii, Enterobacter cloacae, E. coli,   Klebsiella oxytoca, K. pneumoniae, Proteus sp.,   Serratia marcescens, Haemophilus influenzae,   Neisseria meningitidis, Pseudomonas aeruginosa, Candida   albicans, C. glabrata, C krusei, C parapsilosis,   C. tropicalis and the KPC resistance gene.   Organism Identification: Blood Culture PCR   Method Type: PCR   Culture - Blood (10.28.20 @ 10:38)   Specimen Source: .Blood Blood   Culture Results:   No Growth Final

## 2020-11-03 NOTE — PROGRESS NOTE ADULT - ATTENDING COMMENTS
case discussed with staff pt left before I got there case discussed with staff pt left before I got there    patient discussed on the day in question. Note not reviewed and cosigned until now due to scheduling issues

## 2020-11-03 NOTE — PROGRESS NOTE ADULT - ASSESSMENT
64 y/o f s/p left jj stent, + blood cx with E. Coli - resolving sepsis  E coli blood cx    Plan:  awaiting cx sensitivities  will d/c when culture results are final  ID following  pt is aware  will d/w attending   66 y/o f s/p left jj stent, + blood cx with E. Coli - resolving sepsis  E coli blood cx    Plan:  awaiting cx sensitivities  will d/c when culture results are final  ID following  pt is aware  will d/w attending    case discussed with PA  Pt left before i got to the hospital to make rounds  f/u with dr cha

## 2020-11-03 NOTE — DISCHARGE NOTE PROVIDER - CARE PROVIDER_API CALL
Eduardo Salgado)  Urology  [address] [place] [address], [address]  [place] [address]  Phone: [phone]  Fax: [phone]  Follow Up Time:

## 2020-11-03 NOTE — DISCHARGE NOTE PROVIDER - NSDCFUSCHEDAPPT_GEN_ALL_CORE_FT
BEBA HAND ; 11/06/2020 ; [facility] [address]  BEBA HAND ; 11/16/2020 ; [facility] [address]

## 2020-11-03 NOTE — DISCHARGE NOTE PROVIDER - NSDCMRMEDTOKEN_GEN_ALL_CORE_FT
acetaZOLAMIDE 250 mg oral tablet: 1 tab(s) orally 2 times a day   Cipro 500 mg oral tablet: 1 tab(s) orally 2 times a day   naproxen 500 mg oral tablet: 1 tab(s) orally 2 times a day

## 2020-11-03 NOTE — DISCHARGE NOTE PROVIDER - HOSPITAL COURSE
64 y/o female s/p L JJ stent placement for obstructing stone POD #5. Awaiting blood culture sensitivities. Pt doing well without any acute issues and awaiting discharge. Denies fevers, chills, N/V. Pt tolerating diet and ambulating well. Stable for discharge. We will monitor for Urine culture sensitivities and adjust PO abx as necessary.

## 2020-11-03 NOTE — PROGRESS NOTE ADULT - REASON FOR ADMISSION
septic kidney stone

## 2020-11-03 NOTE — PROGRESS NOTE ADULT - ASSESSMENT
ASSESSMENT  66 yo F nephrolithiasis s/p laser lithotripsy 8 years ago, was seen here by ED on 10/19 and was diagnosed with left pyelonephritis and sent home on vantin, now presents to the ED with L flank & LLQ abdominal pain for the past week.     IMPRESSION  #Severe sepsis due to Ecoli bacteremia from L Pyelonephritis with obstructing stone    10/28 BCX GNR    10/28 s/p cystoscopy left ureteral stent insertion, purulent urine    UA      10/19 UCX contaminant    CTAP Mild left hydroureteronephrosis extending to the level of an obstructing irregularly shaped 4.5 x 6.3 x 5.8  mm ureteral calculus at the left UVJ.    Nonobstructing left renal calculi measuring up to 1 cm as above.   #Severe Sepsis on admission T>101F, Pulse>90, WBC>12, lactic acidosis  #Lactic acidosis  #CT bibasilar atelectasis.      RECOMMENDATIONS  - called micro - having some issues performing sensitivities   - ok to d/c on PO cipro 500mg BID x end 11/7  - will f/u final cx and call pt if needed [phone]    If any questions, please call or send a message on [employer] Teams  Spectra [phone]

## 2020-11-06 ENCOUNTER — OUTPATIENT (OUTPATIENT)
Dept: OUTPATIENT SERVICES | Facility: HOSPITAL | Age: 65
LOS: 1 days | Discharge: HOME | End: 2020-11-06

## 2020-11-06 ENCOUNTER — APPOINTMENT (OUTPATIENT)
Dept: OBGYN | Facility: CLINIC | Age: 65
End: 2020-11-06
Payer: MEDICAID

## 2020-11-06 VITALS
DIASTOLIC BLOOD PRESSURE: 80 MMHG | HEIGHT: 60 IN | SYSTOLIC BLOOD PRESSURE: 124 MMHG | WEIGHT: 149.56 LBS | BODY MASS INDEX: 29.36 KG/M2

## 2020-11-06 DIAGNOSIS — Z01.419 ENCOUNTER FOR GYNECOLOGICAL EXAMINATION (GENERAL) (ROUTINE) W/OUT ABNORMAL FINDINGS: ICD-10-CM

## 2020-11-06 DIAGNOSIS — Z90.49 ACQUIRED ABSENCE OF OTHER SPECIFIED PARTS OF DIGESTIVE TRACT: Chronic | ICD-10-CM

## 2020-11-06 DIAGNOSIS — Z00.00 ENCOUNTER FOR GENERAL ADULT MEDICAL EXAMINATION W/OUT ABNORMAL FINDINGS: ICD-10-CM

## 2020-11-06 DIAGNOSIS — Z90.710 ACQUIRED ABSENCE OF BOTH CERVIX AND UTERUS: Chronic | ICD-10-CM

## 2020-11-06 LAB
-  AMIKACIN: SIGNIFICANT CHANGE UP
-  AMPICILLIN/SULBACTAM: SIGNIFICANT CHANGE UP
-  AMPICILLIN: SIGNIFICANT CHANGE UP
-  AZTREONAM: SIGNIFICANT CHANGE UP
-  CEFAZOLIN: SIGNIFICANT CHANGE UP
-  CEFOXITIN: SIGNIFICANT CHANGE UP
-  CEFTAZIDIME: SIGNIFICANT CHANGE UP
-  CEFTRIAXONE: SIGNIFICANT CHANGE UP
-  CIPROFLOXACIN: SIGNIFICANT CHANGE UP
-  ERTAPENEM: SIGNIFICANT CHANGE UP
-  GENTAMICIN: SIGNIFICANT CHANGE UP
-  IMIPENEM: SIGNIFICANT CHANGE UP
-  LEVOFLOXACIN: SIGNIFICANT CHANGE UP
-  MEROPENEM: SIGNIFICANT CHANGE UP
-  PIPERACILLIN/TAZOBACTAM: SIGNIFICANT CHANGE UP
-  TOBRAMYCIN: SIGNIFICANT CHANGE UP
-  TRIMETHOPRIM/SULFAMETHOXAZOLE: SIGNIFICANT CHANGE UP
CULTURE RESULTS: SIGNIFICANT CHANGE UP
METHOD TYPE: SIGNIFICANT CHANGE UP
ORGANISM # SPEC MICROSCOPIC CNT: SIGNIFICANT CHANGE UP
SPECIMEN SOURCE: SIGNIFICANT CHANGE UP

## 2020-11-06 PROCEDURE — 99387 INIT PM E/M NEW PAT 65+ YRS: CPT

## 2020-11-06 NOTE — DISCUSSION/SUMMARY
[FreeTextEntry1] : 64 y/o P6, s/p supracervical hysterectomy with possible unilateral oophorectomy for annual exam\par - mammogram and DEXA scan referral given\par - follow up with PCP on 11/16 as scheduled\par -pap/hpv\par - Educated patient on importance of getting colonoscopy done and to obtain referral from PCP\par - RTC in 1yr or PRN\par

## 2020-11-06 NOTE — PHYSICAL EXAM
[Examination Of The Breasts] : a normal appearance [No Masses] : no breast masses were palpable [Labia Majora] : normal [Atrophy] : atrophy [Normal] : normal [Discharge] : discharge [Moderate] : moderate [Clear] : clear [Absent] : absent [Appropriately responsive] : appropriately responsive [Alert] : alert [No Acute Distress] : no acute distress [No Lymphadenopathy] : no lymphadenopathy [Regular Rate Rhythm] : regular rate rhythm [No Murmurs] : no murmurs [Clear to Auscultation B/L] : clear to auscultation bilaterally [Soft] : soft [Non-tender] : non-tender [Non-distended] : non-distended [No HSM] : No HSM [No Lesions] : no lesions [No Mass] : no mass [Oriented x3] : oriented x3 [Foul Smelling] : not foul smelling

## 2020-11-06 NOTE — HISTORY OF PRESENT ILLNESS
[FreeTextEntry1] : 66 y/o P6 s/p supracervical hysterectomy with possible unilateral oophorectomy in 1987, presents for annual gyn exam. Patient reports last time she saw a gynecologist was 5 yrs ago in [facility], did a pap then, normal per patient. Patient was admitted to [facility], for left sided pyelonephritis on 10/28/20, she underwent left ureteral stent placement, and was in ICU for sepsis. She is currently taking ciprofloxacin. Her last mammogram was 5 yrs ago, and has never gotten a colonoscopy. She denies dysuria, frequency or urgency today. [TextBox_19] : 5 yrs ago, normal per patient [TextBox_37] : never [TextBox_43] : never

## 2020-11-08 DIAGNOSIS — E83.39 OTHER DISORDERS OF PHOSPHORUS METABOLISM: ICD-10-CM

## 2020-11-08 DIAGNOSIS — A41.51 SEPSIS DUE TO ESCHERICHIA COLI [E. COLI]: ICD-10-CM

## 2020-11-08 DIAGNOSIS — N13.6 PYONEPHROSIS: ICD-10-CM

## 2020-11-08 DIAGNOSIS — R65.21 SEVERE SEPSIS WITH SEPTIC SHOCK: ICD-10-CM

## 2020-11-08 DIAGNOSIS — N20.1 CALCULUS OF URETER: ICD-10-CM

## 2020-11-08 DIAGNOSIS — J98.11 ATELECTASIS: ICD-10-CM

## 2020-11-08 DIAGNOSIS — A41.9 SEPSIS, UNSPECIFIED ORGANISM: ICD-10-CM

## 2020-11-09 RX ORDER — AZTREONAM 2 G
1 VIAL (EA) INJECTION
Qty: 14 | Refills: 0
Start: 2020-11-09 | End: 2020-11-15

## 2020-11-10 LAB — HPV HIGH+LOW RISK DNA PNL CVX: NOT DETECTED

## 2020-11-12 ENCOUNTER — APPOINTMENT (OUTPATIENT)
Dept: UROLOGY | Facility: CLINIC | Age: 65
End: 2020-11-12
Payer: MEDICAID

## 2020-11-12 VITALS — WEIGHT: 149 LBS | HEIGHT: 60 IN | TEMPERATURE: 96 F | BODY MASS INDEX: 29.25 KG/M2

## 2020-11-12 PROCEDURE — 99215 OFFICE O/P EST HI 40 MIN: CPT

## 2020-11-12 PROCEDURE — 99072 ADDL SUPL MATRL&STAF TM PHE: CPT

## 2020-11-12 NOTE — HISTORY OF PRESENT ILLNESS
[FreeTextEntry1] : BEBA HAND is a 65 year old female who presents for consultation for ureteral stone and septic shock sp cystoscopy left ureteral stent insertion.\par She presented with a ureteral stone and septic shock October 28, 2020 [facility] emergency room her blood pressure was 70/30.\par She did well postoperatively and was discharged on multiple antibiotics.\par History obtained from son who preferred to be the  today over the phone, Silvestre.\par Blood cultures in the hospital grew out ESBL E. coli.\par Denies gross hematuria, dysuria or associated symptoms. Denies flank pain.\par \par CT abdomen pelvis images visualized October 2020 showing left hydroureteronephrosis to the level of a 5 mm ureterovesical junction stone on the left side.  There are also nonobstructing left renal stones 1 to 1.5 cm in the midpole, visible on . no renal stones on right.\par Denies  PMH including previous kidney stones, recurrent UTIs. \par Family History: No  malignancies\par Social History:from [place]\par

## 2020-11-12 NOTE — ASSESSMENT
[FreeTextEntry1] : BEBA HAND is a 65 year old female who presents for consultation for ureteral stone and septic shock sp cystoscopy left ureteral stent insertion with esbl ecoli bacteremia.\par \par Plan for left ureteroscopy laser lithotripsy ureteral stent exchange of the ureteral and renal stones.  Explained to the patient and her son that there is high left renal stone burden we may not be able to remove all the stones in 1 attempt.  Also explained that for this endoscopic procedure there is a high risk of sepsis given she already had septic shock and bacteremia\par Will need abx pre op based on blood cx\par \par Our stone treatment discussion summarized--\par 1. Surveillance: we discussed risks associated with this approach including increase in size of stone, UTIs, renal obstruction.\par \par 2. URS/LL: we discussed how this is done (transurethrally with small cameras, baskets and a laser), the risks (bleeding, infection, damage to  organs, stricture, inability to access the ureter, stent pain which can be mild, moderate or severe) and benefits (minimally invasive). The patient also understands that if our scopes will not fit into the ureter because the ureter is too small, the patient will be stented and left to dilate with a stent ~2 weeks. We will then re-attempt the ureteroscopy. \par \par 3. ESWL: we discussed how this procedure is performed (transcorporeal shock waves under light anesthesia), the risks (bleeding, failure to fragment stones, steinstrasse) and benefits (least invasive technique). \par \par For these treatment, we also discussed the possible need for additional therapies for persistent stone burden. \par We stressed that when ureteral stents are inserted they are temporary and must be removed within 3 months of placement. Otherwise encrustation, urosepsis, obstruction can occur.\par \par

## 2020-11-16 ENCOUNTER — OUTPATIENT (OUTPATIENT)
Dept: OUTPATIENT SERVICES | Facility: HOSPITAL | Age: 65
LOS: 1 days | Discharge: HOME | End: 2020-11-16

## 2020-11-16 ENCOUNTER — APPOINTMENT (OUTPATIENT)
Dept: INTERNAL MEDICINE | Facility: CLINIC | Age: 65
End: 2020-11-16

## 2020-11-16 ENCOUNTER — APPOINTMENT (OUTPATIENT)
Dept: INTERNAL MEDICINE | Facility: CLINIC | Age: 65
End: 2020-11-16
Payer: MEDICAID

## 2020-11-16 VITALS
HEART RATE: 80 BPM | OXYGEN SATURATION: 97 % | HEIGHT: 60 IN | WEIGHT: 150 LBS | BODY MASS INDEX: 29.45 KG/M2 | SYSTOLIC BLOOD PRESSURE: 111 MMHG | DIASTOLIC BLOOD PRESSURE: 69 MMHG

## 2020-11-16 DIAGNOSIS — Z90.710 ACQUIRED ABSENCE OF BOTH CERVIX AND UTERUS: Chronic | ICD-10-CM

## 2020-11-16 DIAGNOSIS — N20.0 CALCULUS OF KIDNEY: ICD-10-CM

## 2020-11-16 DIAGNOSIS — K80.20 CALCULUS OF GALLBLADDER W/OUT CHOLECYSTITIS W/OUT OBSTRUCTION: ICD-10-CM

## 2020-11-16 DIAGNOSIS — Z90.710 ACQUIRED ABSENCE OF BOTH CERVIX AND UTERUS: ICD-10-CM

## 2020-11-16 DIAGNOSIS — Z90.49 ACQUIRED ABSENCE OF OTHER SPECIFIED PARTS OF DIGESTIVE TRACT: Chronic | ICD-10-CM

## 2020-11-16 PROCEDURE — 99213 OFFICE O/P EST LOW 20 MIN: CPT | Mod: GC

## 2020-11-23 ENCOUNTER — APPOINTMENT (OUTPATIENT)
Dept: INTERNAL MEDICINE | Facility: CLINIC | Age: 65
End: 2020-11-23

## 2020-11-23 DIAGNOSIS — N13.30 UNSPECIFIED HYDRONEPHROSIS: ICD-10-CM

## 2020-11-23 DIAGNOSIS — A41.9 SEPSIS, UNSPECIFIED ORGANISM: ICD-10-CM

## 2020-11-23 DIAGNOSIS — N20.0 CALCULUS OF KIDNEY: ICD-10-CM

## 2020-11-23 DIAGNOSIS — K80.20 CALCULUS OF GALLBLADDER WITHOUT CHOLECYSTITIS WITHOUT OBSTRUCTION: ICD-10-CM

## 2020-11-23 DIAGNOSIS — N20.1 CALCULUS OF URETER: ICD-10-CM

## 2020-12-01 ENCOUNTER — OUTPATIENT (OUTPATIENT)
Dept: OUTPATIENT SERVICES | Facility: HOSPITAL | Age: 65
LOS: 1 days | End: 2020-12-01
Payer: MEDICAID

## 2020-12-01 ENCOUNTER — OUTPATIENT (OUTPATIENT)
Dept: OUTPATIENT SERVICES | Facility: HOSPITAL | Age: 65
LOS: 1 days | Discharge: HOME | End: 2020-12-01
Payer: MEDICAID

## 2020-12-01 ENCOUNTER — RESULT REVIEW (OUTPATIENT)
Age: 65
End: 2020-12-01

## 2020-12-01 VITALS
DIASTOLIC BLOOD PRESSURE: 65 MMHG | SYSTOLIC BLOOD PRESSURE: 129 MMHG | HEART RATE: 74 BPM | HEIGHT: 59.84 IN | WEIGHT: 152.34 LBS | OXYGEN SATURATION: 100 % | RESPIRATION RATE: 13 BRPM | TEMPERATURE: 99 F

## 2020-12-01 DIAGNOSIS — Z90.49 ACQUIRED ABSENCE OF OTHER SPECIFIED PARTS OF DIGESTIVE TRACT: Chronic | ICD-10-CM

## 2020-12-01 DIAGNOSIS — Z98.890 OTHER SPECIFIED POSTPROCEDURAL STATES: Chronic | ICD-10-CM

## 2020-12-01 DIAGNOSIS — Z01.818 ENCOUNTER FOR OTHER PREPROCEDURAL EXAMINATION: ICD-10-CM

## 2020-12-01 DIAGNOSIS — Z90.710 ACQUIRED ABSENCE OF BOTH CERVIX AND UTERUS: Chronic | ICD-10-CM

## 2020-12-01 DIAGNOSIS — N20.2 CALCULUS OF KIDNEY WITH CALCULUS OF URETER: ICD-10-CM

## 2020-12-01 LAB
ALBUMIN SERPL ELPH-MCNC: 4.4 G/DL — SIGNIFICANT CHANGE UP (ref 3.5–5.2)
ALP SERPL-CCNC: 104 U/L — SIGNIFICANT CHANGE UP (ref 30–115)
ALT FLD-CCNC: 19 U/L — SIGNIFICANT CHANGE UP (ref 0–41)
ANION GAP SERPL CALC-SCNC: 8 MMOL/L — SIGNIFICANT CHANGE UP (ref 7–14)
APTT BLD: 31.1 SEC — SIGNIFICANT CHANGE UP (ref 27–39.2)
AST SERPL-CCNC: 19 U/L — SIGNIFICANT CHANGE UP (ref 0–41)
BACTERIA # UR AUTO: ABNORMAL
BILIRUB SERPL-MCNC: 0.4 MG/DL — SIGNIFICANT CHANGE UP (ref 0.2–1.2)
BUN SERPL-MCNC: 14 MG/DL — SIGNIFICANT CHANGE UP (ref 10–20)
CALCIUM SERPL-MCNC: 9.3 MG/DL — SIGNIFICANT CHANGE UP (ref 8.5–10.1)
CHLORIDE SERPL-SCNC: 104 MMOL/L — SIGNIFICANT CHANGE UP (ref 98–110)
CO2 SERPL-SCNC: 27 MMOL/L — SIGNIFICANT CHANGE UP (ref 17–32)
CREAT SERPL-MCNC: 0.7 MG/DL — SIGNIFICANT CHANGE UP (ref 0.7–1.5)
EPI CELLS # UR: 1 /HPF — SIGNIFICANT CHANGE UP (ref 0–5)
GLUCOSE SERPL-MCNC: 105 MG/DL — HIGH (ref 70–99)
HYALINE CASTS # UR AUTO: 1 /LPF — SIGNIFICANT CHANGE UP (ref 0–7)
INR BLD: 0.97 RATIO — SIGNIFICANT CHANGE UP (ref 0.65–1.3)
POTASSIUM SERPL-MCNC: 4.3 MMOL/L — SIGNIFICANT CHANGE UP (ref 3.5–5)
POTASSIUM SERPL-SCNC: 4.3 MMOL/L — SIGNIFICANT CHANGE UP (ref 3.5–5)
PROT SERPL-MCNC: 7.4 G/DL — SIGNIFICANT CHANGE UP (ref 6–8)
PROTHROM AB SERPL-ACNC: 11.1 SEC — SIGNIFICANT CHANGE UP (ref 9.95–12.87)
RBC CASTS # UR COMP ASSIST: 505 /HPF — HIGH (ref 0–4)
SODIUM SERPL-SCNC: 139 MMOL/L — SIGNIFICANT CHANGE UP (ref 135–146)
WBC UR QL: 87 /HPF — HIGH (ref 0–5)

## 2020-12-01 PROCEDURE — 71046 X-RAY EXAM CHEST 2 VIEWS: CPT | Mod: 26

## 2020-12-01 PROCEDURE — 93010 ELECTROCARDIOGRAM REPORT: CPT

## 2020-12-01 NOTE — H&P PST ADULT - NSANTHOSAYNRD_GEN_A_CORE
No. ELENA screening performed.  STOP BANG Legend: 0-2 = LOW Risk; 3-4 = INTERMEDIATE Risk; 5-8 = HIGH Risk

## 2020-12-01 NOTE — PLAN
[FreeTextEntry1] : 65 year old female with no significant PMHx presenting for follow up. \par \par # ) Ureteral stone and septic shock s/p cystoscopy left ureteral stent \par -Completed course of Cipro\par -Urology following, planned for left ureteroscopy laser lithotripsy ureteral stent exchange of the ureteral and renal stones\par -Patient is at below average risk for all post operative complications according to [employer] for a moderate risk procedure \par \par HEALTH CARE MAINTENANCE\par - mammogram and DEXA scan referral given by OBGYN\par - pap/hpv 11/06/20\par - Follow up with GI for colonoscopy, referral given last visit, will re-send\par - Follow up lab work \par - RTC in 6 months \par

## 2020-12-01 NOTE — H&P PST ADULT - NSICDXPASTSURGICALHX_GEN_ALL_CORE_FT
PAST SURGICAL HISTORY:  History of cholecystectomy     History of colostomy reversal 8 cm removed 1987 s/p  complication    History of hysterectomy , s/p

## 2020-12-01 NOTE — H&P PST ADULT - HISTORY OF PRESENT ILLNESS
65y Female presents today for presurgical testing for cystoscopy, left ureteroscopy laser lithotripsy ureteral stent exchange. Patient reports recent hospitalization 11/2020 for pain that started shortly before Oct 31st wherein she experienced nausea, vomiting, chills, back pain. She returned to the ER in 11/2020 where she was informed that she had a UTI, and was treated with antibiotics. She reported back pain, and dysuria at that time and a aguilar catheter was placed.  Patient denies any CP, palpitations, SOB, or cough. No recent URI. She reports some dysuria currently. She denies win hematuria, but does report seeing some "pink" in her urine. For the past 3 days +malodorous urine, + cloudy urine, .   Stated exercise tolerance is FOS 1        ELENA screen reviewed    Patient denies any recent personal exposure to COVID19. Denies any sick contacts. Patient denies any travel within the past 30 days. Patient was instructed to quarantine until after procedure    Encounter for other preprocedural examination  Calculus of kidney with calculus of ureter  ^N20.2/ 69997                                                                  12/11/2020 [facility]    PMH/PSH/  History of hysterectomy  History of cholecystectomy    Anesthesia Alert  NO--Difficult Airway  NO--History of neck surgery or radiation  NO--Limited ROM of neck  NO--History of Malignant hyperthermia  NO--No personal or family history of Pseudocholinesterase deficiency.  NO--Prior Anesthesia Complication  NO--Latex Allergy  NO--Loose teeth - full upper denture, partial lower denture  NO--History of Rheumatoid Arthritis  NO--ELENA  NO--Other_____    Patient states that this is their complete medical history and list of medications

## 2020-12-01 NOTE — HISTORY OF PRESENT ILLNESS
[[employer] ] : provided by [employer]   [FreeTextEntry1] : [id number] [FreeTextEntry2] : Sundeep  [TWNoteComboBox1] : Mongolian [de-identified] : 65 year old female with no significant PMHx presenting following hospitalization at [facility] with L flank & LLQ abdominal pain due to a ureteral stone and septic shock October 28, 2020 at [facility] s/p cystoscopy left ureteral stent insertion with ESBL Ecoli bacteremia.  She was discharged on PO Cipro 500 mg BID ending on  11/7. She saw Dr. De La Cruz on 11/12/20 and she planned for left ureteroscopy laser lithotripsy ureteral stent exchange of the ureteral and renal stones. She states that she has been taking her medications and that in 10 days they are going to remove it. \par

## 2020-12-02 LAB
CULTURE RESULTS: SIGNIFICANT CHANGE UP
SPECIMEN SOURCE: SIGNIFICANT CHANGE UP

## 2020-12-07 ENCOUNTER — LABORATORY RESULT (OUTPATIENT)
Age: 65
End: 2020-12-07

## 2020-12-08 ENCOUNTER — OUTPATIENT (OUTPATIENT)
Dept: OUTPATIENT SERVICES | Facility: HOSPITAL | Age: 65
LOS: 1 days | Discharge: HOME | End: 2020-12-08

## 2020-12-08 DIAGNOSIS — Z90.49 ACQUIRED ABSENCE OF OTHER SPECIFIED PARTS OF DIGESTIVE TRACT: Chronic | ICD-10-CM

## 2020-12-08 DIAGNOSIS — Z90.710 ACQUIRED ABSENCE OF BOTH CERVIX AND UTERUS: Chronic | ICD-10-CM

## 2020-12-08 DIAGNOSIS — Z98.890 OTHER SPECIFIED POSTPROCEDURAL STATES: Chronic | ICD-10-CM

## 2020-12-08 DIAGNOSIS — Z02.9 ENCOUNTER FOR ADMINISTRATIVE EXAMINATIONS, UNSPECIFIED: ICD-10-CM

## 2020-12-08 DIAGNOSIS — Z11.59 ENCOUNTER FOR SCREENING FOR OTHER VIRAL DISEASES: ICD-10-CM

## 2020-12-08 PROBLEM — N39.0 URINARY TRACT INFECTION, SITE NOT SPECIFIED: Chronic | Status: ACTIVE | Noted: 2020-12-01

## 2020-12-08 PROBLEM — H40.9 UNSPECIFIED GLAUCOMA: Chronic | Status: ACTIVE | Noted: 2020-12-01

## 2020-12-10 DIAGNOSIS — N39.0 SEPSIS, UNSPECIFIED ORGANISM: ICD-10-CM

## 2020-12-10 DIAGNOSIS — A41.9 SEPSIS, UNSPECIFIED ORGANISM: ICD-10-CM

## 2020-12-11 ENCOUNTER — INPATIENT (INPATIENT)
Facility: HOSPITAL | Age: 65
LOS: 0 days | Discharge: HOME | End: 2020-12-12
Attending: UROLOGY | Admitting: UROLOGY
Payer: MEDICAID

## 2020-12-11 ENCOUNTER — APPOINTMENT (OUTPATIENT)
Dept: UROLOGY | Facility: HOSPITAL | Age: 65
End: 2020-12-11

## 2020-12-11 VITALS
SYSTOLIC BLOOD PRESSURE: 115 MMHG | OXYGEN SATURATION: 99 % | TEMPERATURE: 98 F | HEART RATE: 78 BPM | HEIGHT: 58 IN | RESPIRATION RATE: 17 BRPM | WEIGHT: 139.99 LBS | DIASTOLIC BLOOD PRESSURE: 71 MMHG

## 2020-12-11 DIAGNOSIS — Z98.890 OTHER SPECIFIED POSTPROCEDURAL STATES: Chronic | ICD-10-CM

## 2020-12-11 DIAGNOSIS — Z90.49 ACQUIRED ABSENCE OF OTHER SPECIFIED PARTS OF DIGESTIVE TRACT: Chronic | ICD-10-CM

## 2020-12-11 DIAGNOSIS — Z90.710 ACQUIRED ABSENCE OF BOTH CERVIX AND UTERUS: Chronic | ICD-10-CM

## 2020-12-11 PROCEDURE — 52353 CYSTOURETERO W/LITHOTRIPSY: CPT | Mod: LT,59

## 2020-12-11 PROCEDURE — 52356 CYSTO/URETERO W/LITHOTRIPSY: CPT | Mod: LT

## 2020-12-11 RX ORDER — SODIUM CHLORIDE 9 MG/ML
1000 INJECTION INTRAMUSCULAR; INTRAVENOUS; SUBCUTANEOUS
Refills: 0 | Status: DISCONTINUED | OUTPATIENT
Start: 2020-12-11 | End: 2020-12-12

## 2020-12-11 RX ORDER — OXYBUTYNIN CHLORIDE 5 MG
5 TABLET ORAL EVERY 12 HOURS
Refills: 0 | Status: DISCONTINUED | OUTPATIENT
Start: 2020-12-11 | End: 2020-12-12

## 2020-12-11 RX ORDER — SODIUM CHLORIDE 9 MG/ML
1000 INJECTION, SOLUTION INTRAVENOUS
Refills: 0 | Status: DISCONTINUED | OUTPATIENT
Start: 2020-12-11 | End: 2020-12-11

## 2020-12-11 RX ORDER — INFLUENZA VIRUS VACCINE 15; 15; 15; 15 UG/.5ML; UG/.5ML; UG/.5ML; UG/.5ML
0.5 SUSPENSION INTRAMUSCULAR ONCE
Refills: 0 | Status: COMPLETED | OUTPATIENT
Start: 2020-12-11 | End: 2020-12-11

## 2020-12-11 RX ORDER — ACETAMINOPHEN 500 MG
650 TABLET ORAL EVERY 6 HOURS
Refills: 0 | Status: DISCONTINUED | OUTPATIENT
Start: 2020-12-11 | End: 2020-12-12

## 2020-12-11 RX ORDER — MEROPENEM 1 G/30ML
1000 INJECTION INTRAVENOUS ONCE
Refills: 0 | Status: COMPLETED | OUTPATIENT
Start: 2020-12-11 | End: 2020-12-11

## 2020-12-11 RX ORDER — MEROPENEM 1 G/30ML
1000 INJECTION INTRAVENOUS EVERY 8 HOURS
Refills: 0 | Status: DISCONTINUED | OUTPATIENT
Start: 2020-12-11 | End: 2020-12-12

## 2020-12-11 RX ORDER — ONDANSETRON 8 MG/1
4 TABLET, FILM COATED ORAL ONCE
Refills: 0 | Status: DISCONTINUED | OUTPATIENT
Start: 2020-12-11 | End: 2020-12-11

## 2020-12-11 RX ORDER — HYDROMORPHONE HYDROCHLORIDE 2 MG/ML
0.5 INJECTION INTRAMUSCULAR; INTRAVENOUS; SUBCUTANEOUS
Refills: 0 | Status: DISCONTINUED | OUTPATIENT
Start: 2020-12-11 | End: 2020-12-11

## 2020-12-11 RX ADMIN — Medication 650 MILLIGRAM(S): at 23:17

## 2020-12-11 RX ADMIN — MEROPENEM 100 MILLIGRAM(S): 1 INJECTION INTRAVENOUS at 21:37

## 2020-12-11 RX ADMIN — MEROPENEM 100 MILLIGRAM(S): 1 INJECTION INTRAVENOUS at 12:14

## 2020-12-11 RX ADMIN — Medication 5 MILLIGRAM(S): at 18:15

## 2020-12-11 RX ADMIN — SODIUM CHLORIDE 125 MILLILITER(S): 9 INJECTION INTRAMUSCULAR; INTRAVENOUS; SUBCUTANEOUS at 23:18

## 2020-12-11 RX ADMIN — Medication 650 MILLIGRAM(S): at 22:32

## 2020-12-11 NOTE — ASU DISCHARGE PLAN (ADULT/PEDIATRIC) - CONDITION AT DISCHARGE
Occupational Therapy  Co-treatment with PT for maximal pt participation, safety, and activity tolerance      Name: Halley Moreno  MRN: [id number]  Admitting Diagnosis:  Right heart failure due to pulmonary hypertension       Recommendations:     Discharge Recommendations: rehabilitation facility  Discharge Equipment Recommendations:  bath bench(bariatric RW and bariatric BSC)  Barriers to discharge:  Decreased caregiver support    Assessment:     Halley Moreno is a 47 y.o. female with a medical diagnosis of Right heart failure due to pulmonary hypertension.  She presents with impaired ADL and mobility performance deficits. Pt very pleasant and eager to participate in OT/PT despite voiced fatigue.  Pt session focused on bed mobility, EOB ADLs, sit>stand t/f, standing dynamic balance tasks including therapeutic dancing, and safe return to bed. Pt continues to show excellent improvements in overall activity tolerance, bed mobility, and standing capability. Pt required SBA for bed mobility (with increased time and initially max inflated SizeWise mattress) and min (A)x2 for sit<stand. Bariatric BSC and RW have been requested for pt usage at hospital with rehab supervisors aware and escalating matter so pt can maximally participate in mobility. Pt remains eager to increase level of (I) and is not at her baseline. Pt would benefit from continued OT skilled services 4x/wk to improve daily living skills to optimize QOL.Pt is recommended to discharge to Rehab at this time. Performance deficits affecting function are weakness, impaired self care skills, impaired endurance, impaired functional mobilty, gait instability.     Rehab Prognosis:  Good; patient would benefit from acute skilled OT services to address these deficits and reach maximum level of function.       Plan:     Patient to be seen 4 x/week to address the above listed problems via self-care/home management, therapeutic activities, therapeutic exercises,  neuromuscular re-education  · Plan of Care Expires: 11/22/20  · Plan of Care Reviewed with: patient    Subjective     Pain/Comfort:  · Pain Rating 1: 0/10  · Pain Rating Post-Intervention 1: 0/10  · Pain Rating Post-Intervention 2: 0/10    Objective:     Communicated with: RN prior to session.  Patient found HOB elevated with telemetry, alamo catheter, pulse ox (continuous) upon OT entry to room.    General Precautions: Standard, fall   Orthopedic Precautions:N/A   Braces: N/A     Occupational Performance:     Bed Mobility:    · Patient completed Rolling/Turning to Left with  stand by assistance  · Patient completed Rolling/Turning to Right with stand by assistance  · Patient completed Scooting/Bridging with stand by assistance  · Patient completed Supine to Sit with stand by assistance  · Patient completed Sit to Supine with stand by assistance     Functional Mobility/Transfers:  · Patient completed Sit <> Stand Transfer with minimum assistance and of 2 persons  with HHA  · Functional Mobility: Pt completed sit<Stand t/f followed by taking ~8 steps forward/backward and several steps laterally. Pt also completed therapeutic dance with dynamic balance incorporated (movement of arms and limbs to favorite song at EOB for ~15 seconds). Pt on SP02 with vital signs normal. Pt with standing rest break 2/2 fatigue.     Activities of Daily Living:  · Grooming: setup (A) to wash hair and face   · Upper Body Dressing: minimum assistance donning gown around back at EOB   · Lower Body Dressing: total assistance adjusting  sock fit for comfort   · Toileting: removal of soiled yani pads and replaced while standing  .      [facility] 6 Click ADL: 14    Treatment & Education:  Pt educated on role of occupational therapy, POC, and safety during ADLs and functional mobility. Pt and OT discussed importance of safe, continued mobility to optimize daily living skills. Pt verbalized understanding.   Pt completed the following during  session: bed mobility, EOB grooming tasks, UB/LB dressing, sit<stand t/f, therapeutic dancing, steps forward/backward and laterally, toileting needs. White board updated during session. Pt given instruction to call for medical staff/nurse for assistance.       Patient left HOB elevated with all lines intact, call button in reach and RN Venkatesh notifiedEducation:      GOALS:   Multidisciplinary Problems     Occupational Therapy Goals        Problem: Occupational Therapy Goal    Goal Priority Disciplines Outcome Interventions   Occupational Therapy Goal     OT, PT/OT Ongoing, Progressing    Description: Goals to be met by: 11/6/2020    Patient will increase functional independence with ADLs by performing:    UE Dressing with Stand-by Assistance.  LE Dressing with Moderate Assistance.  Grooming while EOB with Modified [place].  Toileting from bedside commode with Minimal Assistance for hygiene and clothing management.   Rolling to Bilateral with Stand-by Assistance.   Supine to sit with Contact Guard Assistance.  Stand pivot transfers with Supervision.  Toilet transfer to bedside commode with Supervision.                     Time Tracking:     OT Date of Treatment: 11/04/20  OT Start Time: 1016  OT Stop Time: 1058  OT Total Time (min): 42 min    Billable Minutes:Self Care/Home Management 10 min  Therapeutic Activity 32 min    Tangela Green OT  11/4/2020     Stable

## 2020-12-11 NOTE — ASU PATIENT PROFILE, ADULT - PSH
History of cholecystectomy    History of colostomy reversal  8 cm removed 1987 s/p  complication  History of hysterectomy  , s/p

## 2020-12-11 NOTE — ASU DISCHARGE PLAN (ADULT/PEDIATRIC) - ASU DC SPECIAL INSTRUCTIONSFT
You had a ureteral stent placed in your ureter to help keep the ureter open post operatively. We left the stent on a string. Please be careful when wiping so as to not dislodge the stent (can dab).  You can take ibuprofen (advil/motrin) and add tylenol as needed for pain control.   Please complete the antibiotic course which was also prescribed to your pharmacy. Save one antibiotic tablet for the day of stent removal.  's office will call you for a follow up appointment. You had a ureteral stent placed in your ureter to help keep the ureter open post operatively.  You can take ibuprofen (advil/motrin) and add tylenol as needed for pain control.   Please complete the antibiotic course which was also prescribed to your pharmacy.   's office will call you for a follow up appointment.

## 2020-12-11 NOTE — CHART NOTE - NSCHARTNOTEFT_GEN_A_CORE
Post op  Patient s/p cystoscopy, uteroscopy laser lithotripsy and stent placement with GA  LMA removed in OR awake to PACU awake stable good respiratory effort

## 2020-12-11 NOTE — CHART NOTE - NSCHARTNOTEFT_GEN_A_CORE
Vital Signs Last 24 Hrs  T(C): 37.9 (11 Dec 2020 23:05), Max: 38.3 (11 Dec 2020 19:48)  T(F): 100.2 (11 Dec 2020 23:05), Max: 101 (11 Dec 2020 19:48)  HR: 105 (11 Dec 2020 23:05) (78 - 119)  BP: 96/53 (11 Dec 2020 23:05) (95/57 - 163/80)  BP(mean): --  RR: 16 (11 Dec 2020 23:05) (15 - 22)  SpO2: 98% (11 Dec 2020 18:55) (95% - 100%)  Pt is s/p left litho and stent.  Pt is resting comfortably.  No complaints at this time.  Will start NS at 125cc/hr and recheck VS at 2am.  Pt is also on [place].

## 2020-12-11 NOTE — BRIEF OPERATIVE NOTE - OPERATION/FINDINGS
sp left ureteroscopy laser lithotripsy ureteral stent exchange. ureteral stone successfully removed and part of large renal stone, unable to access anterior calyceal ~8mm stone, 6x 26 jj ureteral stone sp left ureteroscopy laser lithotripsy ureteral stent exchange. ureteral stone successfully removed and part of large renal stone, unable to access anterior calyceal ~8mm stone, 6x 24 jj ureteral stent.  pt received bactrim PO and preop meropenam in accordance w ID

## 2020-12-12 ENCOUNTER — TRANSCRIPTION ENCOUNTER (OUTPATIENT)
Age: 65
End: 2020-12-12

## 2020-12-12 VITALS — DIASTOLIC BLOOD PRESSURE: 58 MMHG | TEMPERATURE: 99 F | HEART RATE: 96 BPM | SYSTOLIC BLOOD PRESSURE: 126 MMHG

## 2020-12-12 LAB
ANION GAP SERPL CALC-SCNC: 10 MMOL/L — SIGNIFICANT CHANGE UP (ref 7–14)
BUN SERPL-MCNC: 17 MG/DL — SIGNIFICANT CHANGE UP (ref 10–20)
CALCIUM SERPL-MCNC: 8 MG/DL — LOW (ref 8.5–10.1)
CHLORIDE SERPL-SCNC: 105 MMOL/L — SIGNIFICANT CHANGE UP (ref 98–110)
CO2 SERPL-SCNC: 24 MMOL/L — SIGNIFICANT CHANGE UP (ref 17–32)
CREAT SERPL-MCNC: 0.9 MG/DL — SIGNIFICANT CHANGE UP (ref 0.7–1.5)
GLUCOSE SERPL-MCNC: 113 MG/DL — HIGH (ref 70–99)
HCT VFR BLD CALC: 31.8 % — LOW (ref 37–47)
HGB BLD-MCNC: 10.4 G/DL — LOW (ref 12–16)
MCHC RBC-ENTMCNC: 29.7 PG — SIGNIFICANT CHANGE UP (ref 27–31)
MCHC RBC-ENTMCNC: 32.7 G/DL — SIGNIFICANT CHANGE UP (ref 32–37)
MCV RBC AUTO: 90.9 FL — SIGNIFICANT CHANGE UP (ref 81–99)
NRBC # BLD: 0 /100 WBCS — SIGNIFICANT CHANGE UP (ref 0–0)
PLATELET # BLD AUTO: 143 K/UL — SIGNIFICANT CHANGE UP (ref 130–400)
POTASSIUM SERPL-MCNC: 3.9 MMOL/L — SIGNIFICANT CHANGE UP (ref 3.5–5)
POTASSIUM SERPL-SCNC: 3.9 MMOL/L — SIGNIFICANT CHANGE UP (ref 3.5–5)
RBC # BLD: 3.5 M/UL — LOW (ref 4.2–5.4)
RBC # FLD: 12.6 % — SIGNIFICANT CHANGE UP (ref 11.5–14.5)
SODIUM SERPL-SCNC: 139 MMOL/L — SIGNIFICANT CHANGE UP (ref 135–146)
WBC # BLD: 15.59 K/UL — HIGH (ref 4.8–10.8)
WBC # FLD AUTO: 15.59 K/UL — HIGH (ref 4.8–10.8)

## 2020-12-12 RX ADMIN — Medication 650 MILLIGRAM(S): at 03:59

## 2020-12-12 RX ADMIN — MEROPENEM 100 MILLIGRAM(S): 1 INJECTION INTRAVENOUS at 14:06

## 2020-12-12 RX ADMIN — Medication 5 MILLIGRAM(S): at 05:26

## 2020-12-12 RX ADMIN — Medication 650 MILLIGRAM(S): at 01:22

## 2020-12-12 RX ADMIN — MEROPENEM 100 MILLIGRAM(S): 1 INJECTION INTRAVENOUS at 05:25

## 2020-12-12 NOTE — DISCHARGE NOTE PROVIDER - NSDCCPTREATMENT_GEN_ALL_CORE_FT
PRINCIPAL PROCEDURE  Procedure: Cystoscopic replacement of ureteral stent  Findings and Treatment: * s/p Cystoscopy with Left ureteroscopy, lithotripsy and placement of left JJ stent on 12/11/2020.  - Continue PO Bactrim.  - F/U with Dr. Salgado on Tuesday 12/15/2020.

## 2020-12-12 NOTE — PROGRESS NOTE ADULT - SUBJECTIVE AND OBJECTIVE BOX
.  Patient seen & examined.  No acute events noted overnight. Iamk=435 last evening but afebrile today.  Patient reports denies pain.  Voiding freely without urinary complaints.        I&O's Detail    11 Dec 2020 07:01  -  12 Dec 2020 07:00  --------------------------------------------------------  IN:    Lactated Ringers: 1425 mL  Total IN: 1425 mL    OUT:    Oral Fluid: 0 mL    Voided (mL): 175 mL  Total OUT: 175 mL    Total NET: 1250 mL            Vital Signs Last 24 Hrs  T(C): 37.4 (12 Dec 2020 16:38), Max: 38.3 (11 Dec 2020 19:48)  T(F): 99.4 (12 Dec 2020 16:38), Max: 101 (11 Dec 2020 19:48)  HR: 96 (12 Dec 2020 16:38) (82 - 119)  BP: 126/58 (12 Dec 2020 16:38) (82/45 - 126/58)  RR: 16 (12 Dec 2020 14:31) (16 - 20)  SpO2: 98% (12 Dec 2020 03:20) (97% - 98%)        Physical exam  General: Wd, Wn, conversant in NAD.  Lungs:  Clear to auscultation, No wheezes, rale or rhonchi.  Cor:  S1 & S2, No murmurs, rubs or gallops.  Abdomen:  + BS, soft, no distention, non-tender, No rebound, guarding or peritoneal signs.  Genitourinary:  No suprapubic tenderness, No CVAT.   Extremity:  No clubbing, cyanosis or edema.  No calf tenderness.    Neuro:  No focal deficits.           LABS:                        10.4   15.59 )-----------( 143      ( 12 Dec 2020 06:10 )             31.8     12-12    139  |  105  |  17  ----------------------------<  113<H>  3.9   |  24  |  0.9    Ca    8.0<L>      12 Dec 2020 06:10        Urine Culture:         Radiology     .   POD # 1 -- s/p left ureteroscopy laser lithotripsy ureteral stent exchange. Ureteral stone successfully removed and part of large renal stone and placement of JJ ureteral stent       Patient prefers her son to help translate [place] for what she doesn't understand.  Patient refused hospital provided .     Patient seen & examined.  No acute events noted overnight.  Jwlm=399 last evening but afebrile since.  Patient feels well and denies pain.  Voiding freely without urinary complaints.        I&O's Detail    11 Dec 2020 07:01  -  12 Dec 2020 07:00  --------------------------------------------------------  IN:    Lactated Ringers: 1425 mL  Total IN: 1425 mL    OUT:    Oral Fluid: 0 mL    Voided (mL): 175 mL  Total OUT: 175 mL    Total NET: 1250 mL            Vital Signs Last 24 Hrs  T(C): 37.4 (12 Dec 2020 16:38), Max: 38.3 (11 Dec 2020 19:48)  T(F): 99.4 (12 Dec 2020 16:38), Max: 101 (11 Dec 2020 19:48)  HR: 96 (12 Dec 2020 16:38) (82 - 119)  BP: 126/58 (12 Dec 2020 16:38) (82/45 - 126/58)  RR: 16 (12 Dec 2020 14:31) (16 - 20)  SpO2: 98% (12 Dec 2020 03:20) (97% - 98%)        Physical exam  General: Wd, Wn, conversant in NAD.  Lungs:  Clear to auscultation, No wheezes, rale or rhonchi.  Cor:  S1 & S2, No murmurs, rubs or gallops.  Abdomen:  + BS, soft, no distention, non-tender, No rebound, guarding or peritoneal signs.  Genitourinary:  No suprapubic tenderness, No CVAT.   Extremity:  No clubbing, cyanosis or edema.  No calf tenderness.    Neuro:  No focal deficits.           LABS:                        10.4   15.59 )-----------( 143      ( 12 Dec 2020 06:10 )             31.8     12-12    139  |  105  |  17  ----------------------------<  113<H>  3.9   |  24  |  0.9    Ca    8.0<L>      12 Dec 2020 06:10

## 2020-12-12 NOTE — DISCHARGE NOTE PROVIDER - CARE PROVIDER_API CALL
Eduardo Salgado)  Urology  [address] [place] [address], [address]  [place] [address]  Phone: [phone]  Fax: [phone]  Scheduled Appointment: 12/15/2020

## 2020-12-12 NOTE — DISCHARGE NOTE PROVIDER - NSDCFUSCHEDAPPT_GEN_ALL_CORE_FT
BEBA HAND ; 12/15/2020 ; NPP Urology [facility] BEBA Vasques ; 12/29/2020 ; NPP Gastro  BEBA Arteaga ; 02/16/2021 ; NPP Cardiology [address]

## 2020-12-12 NOTE — PROGRESS NOTE ADULT - ASSESSMENT
Impression:    sp left ureteroscopy laser lithotripsy ureteral stent exchange. Ureteral stone successfully removed and part of large renal stone and placement of JJ ureteral stent -- POD # 1.      Plan:  - Case d/w Dr. Salgado and states since BP improved and patient asymptomatic that she can be discharged home today and patient to continue her PO Bactrim that she already has at home.    - Dr. Salgado states patient to call office on Monday to confirm her appointment with him for Tuesday.  - Patient and patient's son verbalized understanding of plan & instructions and all questions answered to their satisfaction.  - Discharge home today and F/U as instructed.

## 2020-12-12 NOTE — DISCHARGE NOTE NURSING/CASE MANAGEMENT/SOCIAL WORK - PATIENT PORTAL LINK FT
You can access the FollowMyHealth Patient Portal offered by [facility] by registering at the following website: [email or web address]. By joining [employer]’s FollowMyHealth portal, you will also be able to view your health information using other applications (apps) compatible with our system.

## 2020-12-12 NOTE — DISCHARGE NOTE PROVIDER - NSDCFUADDINST_GEN_ALL_CORE_FT
* Return to Emergency room if develop any persistent fever > 101, chills, tremors, persistent nausea/vomiting, severe pain not relieved by pain medication, inability to urinate, chest pains, difficulty breathing or any bleeding.    * Continue the Bactrim until complete.

## 2020-12-12 NOTE — DISCHARGE NOTE PROVIDER - NSDCCPCAREPLAN_GEN_ALL_CORE_FT
PRINCIPAL DISCHARGE DIAGNOSIS  Diagnosis: Ureteral calculi  Assessment and Plan of Treatment: * s/p Cystoscopy with Left ureteroscopy, lithotripsy and placement of left JJ stent on 12/11/2020.  - Continue PO Bactrim.  - F/U with Dr. Salgado on Tuesday 12/15/2020.

## 2020-12-12 NOTE — DISCHARGE NOTE PROVIDER - HOSPITAL COURSE
.  * Patient admitted on:  12/11/2020.  * Admission diagnosis: Left ureteral stone.  * Procedure & Procedure date:  s/p left ureteroscopy laser lithotripsy ureteral stent exchange. Ureteral stone successfully removed and part of large renal stone and placement of JJ ureteral stent on 12/11/2020.    * HOSPITAL COURSE:   - Ivabx.:  Meropenum.  - Pain medications PRN.   - Serial abdominal exams and monitored vital signs.  - Monitored labs.  - Resumed and tolerated diet on 12/11/2020.    * Discharge date:  12/12/2020 Home with Rx of  PO Bactrim and F/U on Tuesday 12/15/2020.

## 2020-12-14 ENCOUNTER — LABORATORY RESULT (OUTPATIENT)
Age: 65
End: 2020-12-14

## 2020-12-15 ENCOUNTER — APPOINTMENT (OUTPATIENT)
Dept: UROLOGY | Facility: CLINIC | Age: 65
End: 2020-12-15
Payer: MEDICAID

## 2020-12-15 VITALS
HEART RATE: 84 BPM | HEIGHT: 60 IN | WEIGHT: 150 LBS | BODY MASS INDEX: 29.45 KG/M2 | TEMPERATURE: 97.9 F | SYSTOLIC BLOOD PRESSURE: 112 MMHG | DIASTOLIC BLOOD PRESSURE: 68 MMHG

## 2020-12-15 PROCEDURE — 99072 ADDL SUPL MATRL&STAF TM PHE: CPT

## 2020-12-15 PROCEDURE — 99215 OFFICE O/P EST HI 40 MIN: CPT

## 2020-12-15 NOTE — PHYSICAL EXAM

## 2020-12-15 NOTE — ASSESSMENT
[FreeTextEntry1] : BEBA HAND is a 65 year old female who presents for consultation for ureteral stone and septic shock sp cystoscopy left ureteral stent insertion with esbl ecoli bacteremia sp left ureteroscopy laser lithotripsy ureteral stent exchange. ureteral stone successfully removed and part of large renal stone, unable to access anterior calyceal ~8mm stone\par \par Plan for left ESWL, cysto stent removal\par Also explained that for this procedure there is a high risk of sepsis given she already had septic shock and bacteremia\par Will need abx pre op based on blood cx, likely carri again as per ID.\par pre op ucx today\par \par \par Our stone treatment discussion summarized--\par Surveillance: we discussed risks associated with this approach including increase in size of stone, UTIs, renal obstruction.\par \par  ESWL: we discussed how this procedure is performed (transcorporeal shock waves under light anesthesia), the risks (bleeding, failure to fragment stones, steinstrasse) and benefits (least invasive technique). \par \par \par For these treatment, we also discussed the possible need for additional therapies for persistent stone burden. \par We stressed that when ureteral stents are inserted they are temporary and must be removed within 3 months of placement. Otherwise encrustation, urosepsis, obstruction can occur.\par \par

## 2020-12-15 NOTE — HISTORY OF PRESENT ILLNESS
[FreeTextEntry1] : BEBA HAND is a 65 year old female who presents for consultation for ureteral stone and septic shock sp cystoscopy left ureteral stent insertion with esbl ecoli bacteremia sp left ureteroscopy laser lithotripsy ureteral stent exchange. ureteral stone successfully removed and part of large renal stone, unable to access anterior calyceal ~8mm stone\par \par Doing well no more hematuria no f/c.\par Fluoro images visualized showing residual stone burden.\par \par Prev She presented with a ureteral stone and septic shock October 28, 2020 [facility] emergency room her blood pressure was 70/30.\par She did well postoperatively and was discharged on multiple antibiotics.\par History obtained from son who preferred to be the  today over the phone, Silvestre.\par Blood cultures in the hospital grew out ESBL E. coli.\par Denies gross hematuria, dysuria or associated symptoms. Denies flank pain.\par \par CT abdomen pelvis images October 2020 showing left hydroureteronephrosis to the level of a 5 mm ureterovesical junction stone on the left side.  There are also nonobstructing left renal stones 1 to 1.5 cm in the midpole, visible on . no renal stones on right.\par Denies  PMH including previous kidney stones, recurrent UTIs. \par Family History: No  malignancies\par Social History:from [place]\par

## 2020-12-16 DIAGNOSIS — N39.0 URINARY TRACT INFECTION, SITE NOT SPECIFIED: ICD-10-CM

## 2020-12-16 DIAGNOSIS — H40.9 UNSPECIFIED GLAUCOMA: ICD-10-CM

## 2020-12-16 DIAGNOSIS — N20.2 CALCULUS OF KIDNEY WITH CALCULUS OF URETER: ICD-10-CM

## 2020-12-16 DIAGNOSIS — Z90.710 ACQUIRED ABSENCE OF BOTH CERVIX AND UTERUS: ICD-10-CM

## 2020-12-17 LAB
BACTERIA UR CULT: NORMAL
NIDUS STONE QN: SIGNIFICANT CHANGE UP

## 2020-12-29 ENCOUNTER — APPOINTMENT (OUTPATIENT)
Dept: GASTROENTEROLOGY | Facility: CLINIC | Age: 65
End: 2020-12-29

## 2020-12-31 DIAGNOSIS — Z71.89 OTHER SPECIFIED COUNSELING: ICD-10-CM

## 2021-01-06 ENCOUNTER — APPOINTMENT (OUTPATIENT)
Dept: UROLOGY | Facility: HOSPITAL | Age: 66
End: 2021-01-06

## 2021-01-25 ENCOUNTER — APPOINTMENT (OUTPATIENT)
Dept: UROLOGY | Facility: CLINIC | Age: 66
End: 2021-01-25

## 2021-01-25 ENCOUNTER — EMERGENCY (EMERGENCY)
Facility: HOSPITAL | Age: 66
LOS: 0 days | Discharge: HOME | End: 2021-01-25
Attending: STUDENT IN AN ORGANIZED HEALTH CARE EDUCATION/TRAINING PROGRAM | Admitting: STUDENT IN AN ORGANIZED HEALTH CARE EDUCATION/TRAINING PROGRAM
Payer: MEDICAID

## 2021-01-25 VITALS
TEMPERATURE: 98 F | OXYGEN SATURATION: 96 % | HEART RATE: 80 BPM | DIASTOLIC BLOOD PRESSURE: 56 MMHG | RESPIRATION RATE: 18 BRPM | SYSTOLIC BLOOD PRESSURE: 128 MMHG | HEIGHT: 58 IN

## 2021-01-25 DIAGNOSIS — N39.0 URINARY TRACT INFECTION, SITE NOT SPECIFIED: ICD-10-CM

## 2021-01-25 DIAGNOSIS — Z98.890 OTHER SPECIFIED POSTPROCEDURAL STATES: Chronic | ICD-10-CM

## 2021-01-25 DIAGNOSIS — Z90.710 ACQUIRED ABSENCE OF BOTH CERVIX AND UTERUS: Chronic | ICD-10-CM

## 2021-01-25 DIAGNOSIS — R39.89 OTHER SYMPTOMS AND SIGNS INVOLVING THE GENITOURINARY SYSTEM: ICD-10-CM

## 2021-01-25 DIAGNOSIS — Z90.49 ACQUIRED ABSENCE OF OTHER SPECIFIED PARTS OF DIGESTIVE TRACT: Chronic | ICD-10-CM

## 2021-01-25 LAB
ALBUMIN SERPL ELPH-MCNC: 4.3 G/DL — SIGNIFICANT CHANGE UP (ref 3.5–5.2)
ALP SERPL-CCNC: 112 U/L — SIGNIFICANT CHANGE UP (ref 30–115)
ALT FLD-CCNC: 18 U/L — SIGNIFICANT CHANGE UP (ref 0–41)
ANION GAP SERPL CALC-SCNC: 9 MMOL/L — SIGNIFICANT CHANGE UP (ref 7–14)
APPEARANCE UR: ABNORMAL
AST SERPL-CCNC: 19 U/L — SIGNIFICANT CHANGE UP (ref 0–41)
BACTERIA # UR AUTO: ABNORMAL
BASOPHILS # BLD AUTO: 0.02 K/UL — SIGNIFICANT CHANGE UP (ref 0–0.2)
BASOPHILS NFR BLD AUTO: 0.4 % — SIGNIFICANT CHANGE UP (ref 0–1)
BILIRUB SERPL-MCNC: 0.5 MG/DL — SIGNIFICANT CHANGE UP (ref 0.2–1.2)
BILIRUB UR-MCNC: NEGATIVE — SIGNIFICANT CHANGE UP
BUN SERPL-MCNC: 14 MG/DL — SIGNIFICANT CHANGE UP (ref 10–20)
CALCIUM SERPL-MCNC: 9.5 MG/DL — SIGNIFICANT CHANGE UP (ref 8.5–10.1)
CHLORIDE SERPL-SCNC: 105 MMOL/L — SIGNIFICANT CHANGE UP (ref 98–110)
CO2 SERPL-SCNC: 26 MMOL/L — SIGNIFICANT CHANGE UP (ref 17–32)
COLOR SPEC: YELLOW — SIGNIFICANT CHANGE UP
CREAT SERPL-MCNC: 0.7 MG/DL — SIGNIFICANT CHANGE UP (ref 0.7–1.5)
DIFF PNL FLD: ABNORMAL
EOSINOPHIL # BLD AUTO: 0.14 K/UL — SIGNIFICANT CHANGE UP (ref 0–0.7)
EOSINOPHIL NFR BLD AUTO: 2.6 % — SIGNIFICANT CHANGE UP (ref 0–8)
EPI CELLS # UR: 1 /HPF — SIGNIFICANT CHANGE UP (ref 0–5)
GLUCOSE SERPL-MCNC: 99 MG/DL — SIGNIFICANT CHANGE UP (ref 70–99)
GLUCOSE UR QL: NEGATIVE — SIGNIFICANT CHANGE UP
HCT VFR BLD CALC: 37.9 % — SIGNIFICANT CHANGE UP (ref 37–47)
HGB BLD-MCNC: 12.4 G/DL — SIGNIFICANT CHANGE UP (ref 12–16)
HYALINE CASTS # UR AUTO: 2 /LPF — SIGNIFICANT CHANGE UP (ref 0–7)
IMM GRANULOCYTES NFR BLD AUTO: 0.4 % — HIGH (ref 0.1–0.3)
KETONES UR-MCNC: NEGATIVE — SIGNIFICANT CHANGE UP
LACTATE SERPL-SCNC: 0.9 MMOL/L — SIGNIFICANT CHANGE UP (ref 0.7–2)
LEUKOCYTE ESTERASE UR-ACNC: ABNORMAL
LYMPHOCYTES # BLD AUTO: 1.79 K/UL — SIGNIFICANT CHANGE UP (ref 1.2–3.4)
LYMPHOCYTES # BLD AUTO: 33.1 % — SIGNIFICANT CHANGE UP (ref 20.5–51.1)
MCHC RBC-ENTMCNC: 29 PG — SIGNIFICANT CHANGE UP (ref 27–31)
MCHC RBC-ENTMCNC: 32.7 G/DL — SIGNIFICANT CHANGE UP (ref 32–37)
MCV RBC AUTO: 88.8 FL — SIGNIFICANT CHANGE UP (ref 81–99)
MONOCYTES # BLD AUTO: 0.41 K/UL — SIGNIFICANT CHANGE UP (ref 0.1–0.6)
MONOCYTES NFR BLD AUTO: 7.6 % — SIGNIFICANT CHANGE UP (ref 1.7–9.3)
NEUTROPHILS # BLD AUTO: 3.03 K/UL — SIGNIFICANT CHANGE UP (ref 1.4–6.5)
NEUTROPHILS NFR BLD AUTO: 55.9 % — SIGNIFICANT CHANGE UP (ref 42.2–75.2)
NITRITE UR-MCNC: POSITIVE
NRBC # BLD: 0 /100 WBCS — SIGNIFICANT CHANGE UP (ref 0–0)
PH UR: 6.5 — SIGNIFICANT CHANGE UP (ref 5–8)
PLATELET # BLD AUTO: 235 K/UL — SIGNIFICANT CHANGE UP (ref 130–400)
POTASSIUM SERPL-MCNC: 4 MMOL/L — SIGNIFICANT CHANGE UP (ref 3.5–5)
POTASSIUM SERPL-SCNC: 4 MMOL/L — SIGNIFICANT CHANGE UP (ref 3.5–5)
PROT SERPL-MCNC: 7.8 G/DL — SIGNIFICANT CHANGE UP (ref 6–8)
PROT UR-MCNC: ABNORMAL
RBC # BLD: 4.27 M/UL — SIGNIFICANT CHANGE UP (ref 4.2–5.4)
RBC # FLD: 12.2 % — SIGNIFICANT CHANGE UP (ref 11.5–14.5)
RBC CASTS # UR COMP ASSIST: 269 /HPF — HIGH (ref 0–4)
SODIUM SERPL-SCNC: 140 MMOL/L — SIGNIFICANT CHANGE UP (ref 135–146)
SP GR SPEC: 1.02 — SIGNIFICANT CHANGE UP (ref 1.01–1.03)
UROBILINOGEN FLD QL: SIGNIFICANT CHANGE UP
WBC # BLD: 5.41 K/UL — SIGNIFICANT CHANGE UP (ref 4.8–10.8)
WBC # FLD AUTO: 5.41 K/UL — SIGNIFICANT CHANGE UP (ref 4.8–10.8)
WBC UR QL: 324 /HPF — HIGH (ref 0–5)

## 2021-01-25 PROCEDURE — 74018 RADEX ABDOMEN 1 VIEW: CPT | Mod: 26

## 2021-01-25 PROCEDURE — 99284 EMERGENCY DEPT VISIT MOD MDM: CPT

## 2021-01-25 RX ORDER — AZTREONAM 2 G
1 VIAL (EA) INJECTION
Qty: 28 | Refills: 0
Start: 2021-01-25 | End: 2021-02-07

## 2021-01-25 RX ORDER — KETOROLAC TROMETHAMINE 30 MG/ML
15 SYRINGE (ML) INJECTION ONCE
Refills: 0 | Status: DISCONTINUED | OUTPATIENT
Start: 2021-01-25 | End: 2021-01-25

## 2021-01-25 RX ORDER — SODIUM CHLORIDE 9 MG/ML
2000 INJECTION, SOLUTION INTRAVENOUS ONCE
Refills: 0 | Status: COMPLETED | OUTPATIENT
Start: 2021-01-25 | End: 2021-01-25

## 2021-01-25 RX ADMIN — SODIUM CHLORIDE 2000 MILLILITER(S): 9 INJECTION, SOLUTION INTRAVENOUS at 14:58

## 2021-01-25 RX ADMIN — Medication 15 MILLIGRAM(S): at 13:53

## 2021-01-25 RX ADMIN — Medication 1 TABLET(S): at 15:48

## 2021-01-25 RX ADMIN — SODIUM CHLORIDE 2000 MILLILITER(S): 9 INJECTION, SOLUTION INTRAVENOUS at 13:53

## 2021-01-25 NOTE — ED PROVIDER NOTE - CLINICAL SUMMARY MEDICAL DECISION MAKING FREE TEXT BOX
pt presents w/ urinary sx. hx septic stone w/ stent placement. VS and labs unremarkable. pt not septic/sirs negative. d/w uro- can dc given nml labs w/ bactrim and f/u. pt preference is to leave.

## 2021-01-25 NOTE — ED PROVIDER NOTE - PHYSICAL EXAMINATION
CONSTITUTIONAL: in no acute distress.   SKIN: warm, dry  HEAD: Normocephalic.  EYES: PERRL.  ENT: Airway clear.  NECK: Supple.  LYMPH: No acute cervical adenopathy.  CARD: Regular rate and rhythm.   RESP: No wheezing, rales or rhonchi.  ABD: soft, ttp suprapubically, no cva tenderness.  EXT: No clubbing, cyanosis.   NEURO: Alert, oriented.  PSYCH: Cooperative, appropriate.

## 2021-01-25 NOTE — ED PROVIDER NOTE - OBJECTIVE STATEMENT
65F with past medical history of septic stone presents with dysuria. Had lithotrypsy and stent placement 12/11 and was scheduled for removal 1/6, but developed covid in the interim. PT denies fever, chills, flank pain, nausea, vomiting, diarrhea, but admits to suprapubic pain.

## 2021-01-25 NOTE — ED ADULT NURSE NOTE - OBJECTIVE STATEMENT
patient presents to the ED with complaints of painful urination since yesterday. patient denies flank pain. patient states had kidney surgery. denies fever and nausea. patient presents to the ED with complaints of painful urination since yesterday. patient states has some flank pain. patient states had kidney surgery. denies fever and nausea.

## 2021-01-25 NOTE — ED PROVIDER NOTE - PATIENT PORTAL LINK FT
You can access the FollowMyHealth Patient Portal offered by [facility] by registering at the following website: [email or web address]. By joining [employer]’s FollowMyHealth portal, you will also be able to view your health information using other applications (apps) compatible with our system.

## 2021-01-25 NOTE — ED PROVIDER NOTE - PROGRESS NOTE DETAILS
d/w uro- rec labs, ua, JANEEN SC: PT cleared by urology. will given bactrim as last U culture sensitive. Strict return precautions advised. Patient to be discharged from ED. Any available test results were discussed with patient and/or family. Verbal instructions given, including instructions to return to ED immediately for any new, worsening, or concerning symptoms. Patient endorsed understanding. Written discharge instructions additionally given, including follow-up plan.

## 2021-01-25 NOTE — ED PROVIDER NOTE - CARE PROVIDER_API CALL
Eduardo Salgado)  Urology  [address] [place] [address], [address]  [place] [address]  Phone: [phone]  Fax: [phone]  Follow Up Time: 1-3 Days

## 2021-01-28 LAB
-  AMIKACIN: SIGNIFICANT CHANGE UP
-  AMOXICILLIN/CLAVULANIC ACID: SIGNIFICANT CHANGE UP
-  AMPICILLIN/SULBACTAM: SIGNIFICANT CHANGE UP
-  AMPICILLIN: SIGNIFICANT CHANGE UP
-  AZTREONAM: SIGNIFICANT CHANGE UP
-  CEFAZOLIN: SIGNIFICANT CHANGE UP
-  CEFEPIME: SIGNIFICANT CHANGE UP
-  CEFOXITIN: SIGNIFICANT CHANGE UP
-  CEFTRIAXONE: SIGNIFICANT CHANGE UP
-  CIPROFLOXACIN: SIGNIFICANT CHANGE UP
-  ERTAPENEM: SIGNIFICANT CHANGE UP
-  GENTAMICIN: SIGNIFICANT CHANGE UP
-  IMIPENEM: SIGNIFICANT CHANGE UP
-  LEVOFLOXACIN: SIGNIFICANT CHANGE UP
-  MEROPENEM: SIGNIFICANT CHANGE UP
-  PIPERACILLIN/TAZOBACTAM: SIGNIFICANT CHANGE UP
-  TIGECYCLINE: SIGNIFICANT CHANGE UP
-  TOBRAMYCIN: SIGNIFICANT CHANGE UP
-  TRIMETHOPRIM/SULFAMETHOXAZOLE: SIGNIFICANT CHANGE UP
CULTURE RESULTS: SIGNIFICANT CHANGE UP
METHOD TYPE: SIGNIFICANT CHANGE UP
ORGANISM # SPEC MICROSCOPIC CNT: SIGNIFICANT CHANGE UP
ORGANISM # SPEC MICROSCOPIC CNT: SIGNIFICANT CHANGE UP
SPECIMEN SOURCE: SIGNIFICANT CHANGE UP

## 2021-02-01 PROCEDURE — G9005: CPT

## 2021-02-02 ENCOUNTER — APPOINTMENT (OUTPATIENT)
Dept: GASTROENTEROLOGY | Facility: CLINIC | Age: 66
End: 2021-02-02
Payer: COMMERCIAL

## 2021-02-02 ENCOUNTER — OUTPATIENT (OUTPATIENT)
Dept: OUTPATIENT SERVICES | Facility: HOSPITAL | Age: 66
LOS: 1 days | Discharge: HOME | End: 2021-02-02

## 2021-02-02 DIAGNOSIS — Z90.710 ACQUIRED ABSENCE OF BOTH CERVIX AND UTERUS: Chronic | ICD-10-CM

## 2021-02-02 DIAGNOSIS — Z90.49 ACQUIRED ABSENCE OF OTHER SPECIFIED PARTS OF DIGESTIVE TRACT: Chronic | ICD-10-CM

## 2021-02-02 DIAGNOSIS — Z98.890 OTHER SPECIFIED POSTPROCEDURAL STATES: Chronic | ICD-10-CM

## 2021-02-02 PROCEDURE — 99213 OFFICE O/P EST LOW 20 MIN: CPT | Mod: GE,95

## 2021-02-02 NOTE — ASSESSMENT
[FreeTextEntry1] : 64 yo  Hx of nephrolithiasis. Average risk CRC past due for screening (no previous screening).\par \par 1)Average risk CRC\par 2)Nephrolithiasis\par \par Rec:\par - Colonoscopy\par - Follow up after the colonoscopy

## 2021-02-02 NOTE — REASON FOR VISIT
[Home] : at home, [unfilled] , at the time of the visit. [Medical Office: ([facility])___] : at the medical office located in  [Family Member] : family member [Verbal consent obtained from patient] : the patient, [unfilled] [Consultation] : a consultation visit [FreeTextEntry3] : Through adam Rivers [FreeTextEntry1] : Referred for CRC screening.

## 2021-02-02 NOTE — HISTORY OF PRESENT ILLNESS
[de-identified] : 65 year old female with no significant PMHx except for recent nephrolithiasis SP cysto and following up with , referred for CRC screening.\par \par Denies, hematemesis, melena, hematochezia.\par Denies weight loss.\par \par Her daughter passed away yesterday and is currently grieving.\par \par Denies FHx of CRC endorses a FHx of "liver cancer" daughter (diagnosed with cholangioca at [facility]) and brother.\par \par labs and imaging reviewed:unremarkable LFTs and unremarkable liver on CT in oct 2020.

## 2021-02-03 DIAGNOSIS — N20.0 CALCULUS OF KIDNEY: ICD-10-CM

## 2021-02-16 ENCOUNTER — APPOINTMENT (OUTPATIENT)
Dept: CARDIOLOGY | Facility: CLINIC | Age: 66
End: 2021-02-16
Payer: MEDICAID

## 2021-02-16 ENCOUNTER — OUTPATIENT (OUTPATIENT)
Dept: OUTPATIENT SERVICES | Facility: HOSPITAL | Age: 66
LOS: 1 days | Discharge: HOME | End: 2021-02-16

## 2021-02-16 VITALS
HEART RATE: 75 BPM | SYSTOLIC BLOOD PRESSURE: 128 MMHG | OXYGEN SATURATION: 99 % | WEIGHT: 150 LBS | TEMPERATURE: 97.9 F | DIASTOLIC BLOOD PRESSURE: 77 MMHG | HEIGHT: 60 IN | BODY MASS INDEX: 29.45 KG/M2

## 2021-02-16 DIAGNOSIS — Z98.890 OTHER SPECIFIED POSTPROCEDURAL STATES: Chronic | ICD-10-CM

## 2021-02-16 DIAGNOSIS — Z90.49 ACQUIRED ABSENCE OF OTHER SPECIFIED PARTS OF DIGESTIVE TRACT: Chronic | ICD-10-CM

## 2021-02-16 DIAGNOSIS — Z01.818 ENCOUNTER FOR OTHER PREPROCEDURAL EXAMINATION: ICD-10-CM

## 2021-02-16 DIAGNOSIS — Z90.710 ACQUIRED ABSENCE OF BOTH CERVIX AND UTERUS: Chronic | ICD-10-CM

## 2021-02-16 PROCEDURE — 99204 OFFICE O/P NEW MOD 45 MIN: CPT

## 2021-02-16 PROCEDURE — 93000 ELECTROCARDIOGRAM COMPLETE: CPT | Mod: NC

## 2021-02-16 NOTE — PHYSICAL EXAM
[General Appearance - Well Developed] : well developed [Normal Conjunctiva] : the conjunctiva exhibited no abnormalities [Normal Oral Mucosa] : normal oral mucosa [Auscultation Breath Sounds / Voice Sounds] : lungs were clear to auscultation bilaterally [Heart Sounds] : normal S1 and S2 [Murmurs] : no murmurs present [Arterial Pulses Normal] : the arterial pulses were normal [Bowel Sounds] : normal bowel sounds [Abdomen Soft] : soft [Abdomen Tenderness] : non-tender [Abnormal Walk] : normal gait [Nail Clubbing] : no clubbing of the fingernails [Cyanosis, Localized] : no localized cyanosis [Oriented To Time, Place, And Person] : oriented to person, place, and time

## 2021-02-16 NOTE — PHYSICAL EXAM
[General Appearance - Well Developed] : well developed [Normal Conjunctiva] : the conjunctiva exhibited no abnormalities [Normal Oral Mucosa] : normal oral mucosa [Auscultation Breath Sounds / Voice Sounds] : lungs were clear to auscultation bilaterally [Heart Sounds] : normal S1 and S2 [Murmurs] : no murmurs present [Arterial Pulses Normal] : the arterial pulses were normal [Bowel Sounds] : normal bowel sounds [Abdomen Soft] : soft [Abdomen Tenderness] : non-tender [Nail Clubbing] : no clubbing of the fingernails [Abnormal Walk] : normal gait [Cyanosis, Localized] : no localized cyanosis [Oriented To Time, Place, And Person] : oriented to person, place, and time

## 2021-02-17 ENCOUNTER — LABORATORY RESULT (OUTPATIENT)
Age: 66
End: 2021-02-17

## 2021-02-18 NOTE — ASSESSMENT
[FreeTextEntry1] : 64 yo female patient with no pertinent cardiac history presenting for pre-op evaluation\par \par # Pre-op evaluation\par - METS>4; no chest pain or dyspnea on exertion\par - RCRI 3.9%; Coates score 0.03%\par - Needs 2d echo pre-op\par - Low risk patient for a moderate risk procedure\par - F/U PRN

## 2021-02-18 NOTE — ASSESSMENT
[FreeTextEntry1] : 66 yo female patient with no pertinent cardiac history presenting for pre-op evaluation\par \par # Pre-op evaluation\par - METS>4; no chest pain or dyspnea on exertion\par - RCRI 3.9%; Coates score 0.03%\par - Needs 2d echo pre-op\par - Low risk patient for a moderate risk procedure\par - F/U PRN

## 2021-02-18 NOTE — REVIEW OF SYSTEMS
[Fever] : no fever [Chills] : no chills [Blurry Vision] : no blurred vision [Earache] : no earache [Shortness Of Breath] : no shortness of breath [Dyspnea on exertion] : not dyspnea during exertion [Chest Pain] : no chest pain [Lower Ext Edema] : no extremity edema [Leg Claudication] : no intermittent leg claudication [Palpitations] : no palpitations [Cough] : no cough [Abdominal Pain] : no abdominal pain [Dizziness] : no dizziness

## 2021-02-18 NOTE — REVIEW OF SYSTEMS
[Fever] : no fever [Chills] : no chills [Blurry Vision] : no blurred vision [Earache] : no earache [Shortness Of Breath] : no shortness of breath [Dyspnea on exertion] : not dyspnea during exertion [Chest Pain] : no chest pain [Lower Ext Edema] : no extremity edema [Palpitations] : no palpitations [Leg Claudication] : no intermittent leg claudication [Cough] : no cough [Abdominal Pain] : no abdominal pain [Dizziness] : no dizziness

## 2021-02-18 NOTE — HISTORY OF PRESENT ILLNESS
[Preoperative Visit] : for a medical evaluation prior to surgery [Scheduled Procedure ___] : a [unfilled] [Date of Surgery ___] : on [unfilled] [Electrocardiogram] : ~T an ECG ~C was performed [Metabolic Capacity ___Mets%] : The patient has a metabolic capacity of [unfilled] Mets%  [Good] : Good [Fever] : no fever [Chills] : no chills [Chest Pain] : no chest pain [Dyspnea] : no dyspnea [Cough] : no cough [Lower Extremity Swelling] : no lower extremity swelling [Diabetes] : no diabetes [Cardiovascular Disease] : no cardiovascular disease [Pulmonary Disease] : no pulmonary disease [de-identified] : ECG Normal [Sleep Apnea] : no sleep apnea [FreeTextEntry1] : 66 yo female patient presenting for pre-operative evaluation.\par Patient has no known CAD, no HTn, no DL, no DM.\par Patient is able to walk more than 4 blocks, and climb more than 2 flights of stairs without symptoms.\par no chest pain or dyspnea on exertion, no orthopnea, no PND, no NADEEM

## 2021-02-18 NOTE — HISTORY OF PRESENT ILLNESS
[Preoperative Visit] : for a medical evaluation prior to surgery [Scheduled Procedure ___] : a [unfilled] [Date of Surgery ___] : on [unfilled] [Electrocardiogram] : ~T an ECG ~C was performed [Metabolic Capacity ___Mets%] : The patient has a metabolic capacity of [unfilled] Mets%  [Good] : Good [Fever] : no fever [Chills] : no chills [Chest Pain] : no chest pain [Cough] : no cough [Dyspnea] : no dyspnea [Lower Extremity Swelling] : no lower extremity swelling [Diabetes] : no diabetes [Cardiovascular Disease] : no cardiovascular disease [Pulmonary Disease] : no pulmonary disease [Sleep Apnea] : no sleep apnea [de-identified] : ECG Normal [FreeTextEntry1] : 66 yo female patient presenting for pre-operative evaluation.\par Patient has no known CAD, no HTn, no DL, no DM.\par Patient is able to walk more than 4 blocks, and climb more than 2 flights of stairs without symptoms.\par no chest pain or dyspnea on exertion, no orthopnea, no PND, no NADEEM

## 2021-02-23 LAB — BACTERIA UR CULT: ABNORMAL

## 2021-02-25 ENCOUNTER — NON-APPOINTMENT (OUTPATIENT)
Age: 66
End: 2021-02-25

## 2021-02-28 ENCOUNTER — OUTPATIENT (OUTPATIENT)
Dept: OUTPATIENT SERVICES | Facility: HOSPITAL | Age: 66
LOS: 1 days | Discharge: HOME | End: 2021-02-28

## 2021-02-28 ENCOUNTER — LABORATORY RESULT (OUTPATIENT)
Age: 66
End: 2021-02-28

## 2021-02-28 DIAGNOSIS — Z90.49 ACQUIRED ABSENCE OF OTHER SPECIFIED PARTS OF DIGESTIVE TRACT: Chronic | ICD-10-CM

## 2021-02-28 DIAGNOSIS — Z98.890 OTHER SPECIFIED POSTPROCEDURAL STATES: Chronic | ICD-10-CM

## 2021-02-28 DIAGNOSIS — Z90.710 ACQUIRED ABSENCE OF BOTH CERVIX AND UTERUS: Chronic | ICD-10-CM

## 2021-02-28 DIAGNOSIS — Z11.59 ENCOUNTER FOR SCREENING FOR OTHER VIRAL DISEASES: ICD-10-CM

## 2021-03-03 ENCOUNTER — OUTPATIENT (OUTPATIENT)
Dept: OUTPATIENT SERVICES | Facility: HOSPITAL | Age: 66
LOS: 1 days | Discharge: HOME | End: 2021-03-03
Payer: MEDICAID

## 2021-03-03 ENCOUNTER — APPOINTMENT (OUTPATIENT)
Dept: UROLOGY | Facility: HOSPITAL | Age: 66
End: 2021-03-03

## 2021-03-03 VITALS
HEART RATE: 83 BPM | OXYGEN SATURATION: 99 % | SYSTOLIC BLOOD PRESSURE: 123 MMHG | DIASTOLIC BLOOD PRESSURE: 74 MMHG | RESPIRATION RATE: 18 BRPM | WEIGHT: 154.98 LBS | TEMPERATURE: 97 F

## 2021-03-03 VITALS — DIASTOLIC BLOOD PRESSURE: 70 MMHG | RESPIRATION RATE: 18 BRPM | HEART RATE: 77 BPM | SYSTOLIC BLOOD PRESSURE: 122 MMHG

## 2021-03-03 DIAGNOSIS — Z98.890 OTHER SPECIFIED POSTPROCEDURAL STATES: Chronic | ICD-10-CM

## 2021-03-03 DIAGNOSIS — Z90.710 ACQUIRED ABSENCE OF BOTH CERVIX AND UTERUS: Chronic | ICD-10-CM

## 2021-03-03 DIAGNOSIS — Z90.49 ACQUIRED ABSENCE OF OTHER SPECIFIED PARTS OF DIGESTIVE TRACT: Chronic | ICD-10-CM

## 2021-03-03 PROCEDURE — 50590 FRAGMENTING OF KIDNEY STONE: CPT | Mod: LT

## 2021-03-03 PROCEDURE — 52315 CYSTOSCOPY AND TREATMENT: CPT

## 2021-03-03 RX ORDER — HYDROMORPHONE HYDROCHLORIDE 2 MG/ML
0.5 INJECTION INTRAMUSCULAR; INTRAVENOUS; SUBCUTANEOUS ONCE
Refills: 0 | Status: DISCONTINUED | OUTPATIENT
Start: 2021-03-03 | End: 2021-03-03

## 2021-03-03 RX ORDER — OXYCODONE AND ACETAMINOPHEN 5; 325 MG/1; MG/1
1 TABLET ORAL ONCE
Refills: 0 | Status: DISCONTINUED | OUTPATIENT
Start: 2021-03-03 | End: 2021-03-03

## 2021-03-03 RX ORDER — SODIUM CHLORIDE 9 MG/ML
1000 INJECTION, SOLUTION INTRAVENOUS
Refills: 0 | Status: DISCONTINUED | OUTPATIENT
Start: 2021-03-03 | End: 2021-03-03

## 2021-03-03 RX ORDER — ONDANSETRON 8 MG/1
4 TABLET, FILM COATED ORAL ONCE
Refills: 0 | Status: DISCONTINUED | OUTPATIENT
Start: 2021-03-03 | End: 2021-03-03

## 2021-03-03 RX ORDER — HYDROMORPHONE HYDROCHLORIDE 2 MG/ML
1 INJECTION INTRAMUSCULAR; INTRAVENOUS; SUBCUTANEOUS ONCE
Refills: 0 | Status: DISCONTINUED | OUTPATIENT
Start: 2021-03-03 | End: 2021-03-03

## 2021-03-03 NOTE — ASU DISCHARGE PLAN (ADULT/PEDIATRIC) - ASU DC SPECIAL INSTRUCTIONSFT
Please take flomax for the next month ( at pharmacy) to help pass stones. Tylenol should be sufficient as needed for pain. Drinks approximately 3 liters of water every day. If you develop fevers/chills >100.4, visit emergency room immediately and inform Dr Salgado's office.  The office will call you for a follow up appointment with Dr Salgado. Please obtain an xray a few days before your appointment which the office will arrange.  You will be given a strainer to collect stones with urination. Save any stones and bring them to your next appointment.

## 2021-03-03 NOTE — CHART NOTE - NSCHARTNOTEFT_GEN_A_CORE
PACU ANESTHESIA ADMISSION NOTE      Procedure: Shockwave lithotripsy      Post op diagnosis:  Left renal stone        ____  Intubated  TV:______       Rate: ______      FiO2: ______    ___x_  Patent Airway    __x__  Full return of protective reflexes    ___x_  Full recovery from anesthesia / back to baseline     als:   T:    97.8       R:  14               BP:        125/64          Sat:  100%                P: 95      Mental Status:  _x__ Awake   _____ Alert   _____ Drowsy   _____ Sedated    Nausea/Vomiting:  __x__ NO  ______Yes,   See Post - Op Orders          Pain Scale (0-10):  ___0__    Treatment: ____ None    ____ See Post - Op/PCA Orders    Post - Operative Fluids:   ____ Oral   _x___ See Post - Op Orders    Plan: Discharge:   ___x_Home       _____Floor     _____Critical Care    _____  Other:_________________    Comments:

## 2021-03-08 DIAGNOSIS — N20.0 CALCULUS OF KIDNEY: ICD-10-CM

## 2021-03-08 DIAGNOSIS — H40.9 UNSPECIFIED GLAUCOMA: ICD-10-CM

## 2021-03-08 DIAGNOSIS — Z90.710 ACQUIRED ABSENCE OF BOTH CERVIX AND UTERUS: ICD-10-CM

## 2021-03-08 DIAGNOSIS — Z87.440 PERSONAL HISTORY OF URINARY (TRACT) INFECTIONS: ICD-10-CM

## 2021-03-10 ENCOUNTER — NON-APPOINTMENT (OUTPATIENT)
Age: 66
End: 2021-03-10

## 2021-03-16 ENCOUNTER — APPOINTMENT (OUTPATIENT)
Dept: UROLOGY | Facility: CLINIC | Age: 66
End: 2021-03-16

## 2021-03-20 ENCOUNTER — OUTPATIENT (OUTPATIENT)
Dept: OUTPATIENT SERVICES | Facility: HOSPITAL | Age: 66
LOS: 1 days | Discharge: HOME | End: 2021-03-20

## 2021-03-20 DIAGNOSIS — Z11.59 ENCOUNTER FOR SCREENING FOR OTHER VIRAL DISEASES: ICD-10-CM

## 2021-03-20 DIAGNOSIS — Z90.710 ACQUIRED ABSENCE OF BOTH CERVIX AND UTERUS: Chronic | ICD-10-CM

## 2021-03-20 DIAGNOSIS — Z90.49 ACQUIRED ABSENCE OF OTHER SPECIFIED PARTS OF DIGESTIVE TRACT: Chronic | ICD-10-CM

## 2021-03-20 DIAGNOSIS — Z98.890 OTHER SPECIFIED POSTPROCEDURAL STATES: Chronic | ICD-10-CM

## 2021-03-23 ENCOUNTER — TRANSCRIPTION ENCOUNTER (OUTPATIENT)
Age: 66
End: 2021-03-23

## 2021-03-23 ENCOUNTER — OUTPATIENT (OUTPATIENT)
Dept: OUTPATIENT SERVICES | Facility: HOSPITAL | Age: 66
LOS: 1 days | Discharge: HOME | End: 2021-03-23

## 2021-03-23 VITALS
HEART RATE: 79 BPM | DIASTOLIC BLOOD PRESSURE: 82 MMHG | RESPIRATION RATE: 18 BRPM | TEMPERATURE: 98 F | OXYGEN SATURATION: 100 % | SYSTOLIC BLOOD PRESSURE: 148 MMHG

## 2021-03-23 VITALS
WEIGHT: 174.17 LBS | HEIGHT: 59 IN | HEART RATE: 81 BPM | DIASTOLIC BLOOD PRESSURE: 76 MMHG | TEMPERATURE: 98 F | RESPIRATION RATE: 18 BRPM | SYSTOLIC BLOOD PRESSURE: 135 MMHG

## 2021-03-23 DIAGNOSIS — Z98.890 OTHER SPECIFIED POSTPROCEDURAL STATES: Chronic | ICD-10-CM

## 2021-03-23 DIAGNOSIS — Z90.49 ACQUIRED ABSENCE OF OTHER SPECIFIED PARTS OF DIGESTIVE TRACT: Chronic | ICD-10-CM

## 2021-03-23 DIAGNOSIS — Z90.710 ACQUIRED ABSENCE OF BOTH CERVIX AND UTERUS: Chronic | ICD-10-CM

## 2021-03-23 NOTE — CHART NOTE - NSCHARTNOTEFT_GEN_A_CORE
PACU ANESTHESIA ADMISSION NOTE      Procedure:   Post op diagnosis:      ____  Intubated  TV:______       Rate: ______      FiO2: ______    __x__  Patent Airway    __x__  Full return of protective reflexes    __x__  Full recovery from anesthesia / back to baseline     Vitals:   See Anesthesia record  T- 97.3 P- 80 R-18 B/P- 123/70 SpO2- 98% on RA    Mental Status:  __x__ Awake   ___x__ Alert   _____ Drowsy   _____ Sedated    Nausea/Vomiting:  __x__ NO  ______Yes,   See Post - Op Orders          Pain Scale (0-10):  _0____    Treatment: ____ None    ____ See Post - Op/PCA Orders    Post - Operative Fluids:   ____ Oral   __x__ See Post - Op Orders    Plan: Discharge:   __x__Home       _____Floor     _____Critical Care    _____  Other:_________________    Comments: No anesthesia complications/issues noted. Discharge to HOME when criteria met.

## 2021-03-23 NOTE — H&P PST ADULT - HISTORY OF PRESENT ILLNESS
64 yo  Hx of nephrolithiasis. Average risk CRC past due for screening (no previous screening).  Pt is here for colonoscopy

## 2021-03-26 ENCOUNTER — RX RENEWAL (OUTPATIENT)
Age: 66
End: 2021-03-26

## 2021-03-26 RX ORDER — TAMSULOSIN HYDROCHLORIDE 0.4 MG/1
0.4 CAPSULE ORAL
Qty: 30 | Refills: 0 | Status: ACTIVE | COMMUNITY
Start: 2021-03-03 | End: 1900-01-01

## 2021-03-31 DIAGNOSIS — Z12.11 ENCOUNTER FOR SCREENING FOR MALIGNANT NEOPLASM OF COLON: ICD-10-CM

## 2021-03-31 DIAGNOSIS — H40.9 UNSPECIFIED GLAUCOMA: ICD-10-CM

## 2021-03-31 DIAGNOSIS — K64.8 OTHER HEMORRHOIDS: ICD-10-CM

## 2021-06-07 ENCOUNTER — APPOINTMENT (OUTPATIENT)
Dept: INTERNAL MEDICINE | Facility: CLINIC | Age: 66
End: 2021-06-07

## 2021-06-15 NOTE — ASU PATIENT PROFILE, ADULT - AS SC BRADEN FRICTION
6/15/2021       RE: Gabe Madrigal  [address]  [place] [facility] [address]     Dear Colleague,    Thank you for referring your patient, Gabe Madrigal, to the [facility] [place] at [facility]. Please see a copy of my visit note below.    Patient is being seen for his lumbar spinal stenosis.   He had imaging done in [place]. (1/21/2021)   There is MR imaging of lumbar spine, but no radiologist interpretation/report.     There was mention that the report is available, however in Nepali?  We can have Nepali  translate, or have one of our radiologists do a read  Of outside imaging so we have a report.     Patient can be seen in person when report is available.     Elicia Fuentes, FRANCESCA  Neurosurgery Nurse Practitioner  [facility]  [phone]          Again, thank you for allowing me to participate in the care of your patient.      Sincerely,    Elicia Fuentes, SAMANTHA CNP      
(3) no apparent problem

## 2021-06-28 ENCOUNTER — APPOINTMENT (OUTPATIENT)
Dept: VASCULAR SURGERY | Facility: CLINIC | Age: 66
End: 2021-06-28
Payer: MEDICAID

## 2021-06-28 VITALS
DIASTOLIC BLOOD PRESSURE: 83 MMHG | HEIGHT: 60 IN | WEIGHT: 168 LBS | TEMPERATURE: 97.6 F | SYSTOLIC BLOOD PRESSURE: 164 MMHG | HEART RATE: 86 BPM | BODY MASS INDEX: 32.98 KG/M2

## 2021-06-28 DIAGNOSIS — I83.893 VARICOSE VEINS OF BILATERAL LOWER EXTREMITIES WITH OTHER COMPLICATIONS: ICD-10-CM

## 2021-06-28 PROCEDURE — 99204 OFFICE O/P NEW MOD 45 MIN: CPT

## 2021-06-28 PROCEDURE — 93970 EXTREMITY STUDY: CPT

## 2021-06-28 RX ORDER — CEPHALEXIN 500 MG/1
500 CAPSULE ORAL
Qty: 14 | Refills: 0 | Status: COMPLETED | COMMUNITY
Start: 2021-02-25 | End: 2021-06-28

## 2021-06-28 NOTE — HISTORY OF PRESENT ILLNESS
[FreeTextEntry1] : 67 y/o female presents for evaluation of lower extremity varicose veins, c/o leg pain and heaviness after walking, worse on right leg, also c/o right knee pain.

## 2021-06-28 NOTE — DATA REVIEWED
[FreeTextEntry1] : I performed a venous duplex which was medically necessary to evaluate for GSV reflux. It showed no evidence of DVT or superficial phlebitis in the lower extremities, right GSV was incompetent and positive for reflux. \par

## 2021-06-28 NOTE — ASSESSMENT
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 She is travelling to [place] and will return in August and will be scheduled for right GSV EVLT after her return. \par \par She was advised to use compression stockings daily during the day.

## 2021-06-28 NOTE — END OF VISIT
[FreeTextEntry3] : 66 years old female with R GSV reflux and visible varicosity in RLE.\par She will be travelling in July and will be back in August.\par \par Will give her prescription for knee-high compression stockings (20 mmHg),\par and she will call to schedule her R GSV EVLT in the office in August. I explained to her\par about 1% risk of DVT after EVLT, and that we check every patient for that.\par She expressed understanding.\par

## 2021-07-06 NOTE — ED PROVIDER NOTE - CHIEF COMPLAINT
Preoperative Diagnosis: Anal pain with bleeding                                                  Skin tag          Postoperative Diagnosis: Bleeding anal skin tag        Procedure(s): Exam under anesthesia                             Excision of bleeding anal skin tag        Surgeon/Proceduralist: Fernando Juarez MD     Assistant name and significant procedure(s) performed: none     Anesthesia Type: LMA          Estimated Blood Loss: min     Transfusion Summary: none     Specimen(s):   ID Type Source Tests Collected by Time   A : anal skin tag Tissue Rectum SURGICAL PATHOLOGY Fernando Juarez MD 6/30/2021 1252         Grafts/Implants: none        Complications: none      Indications for Procedure:  Patient is a pleasant 38 year old female with known history of anal bleeding and a large anal skin tag.  It was unclear from exam if the bleeding and some pain was related to the skin tag or further internal anal pathology.  She was seen and evaluated and deemed appropriate for the above mentioned procedure.    Operative Note:   Patient was seen and examined, risks and benefits explained, including but not limited to bleeding/infection/recurrence/possible damage to nearby structures.  Consent was obtained, time out rendered and antibiotics administered.  Patient was placed on the OR table in high lithotomy position after adequate anesthesia was established.  Patients perineum was prepped and draped in sterile fashion.  Digital and speculum examination were completed and there were not internal or external hemorrhoids, fissures, or abscesses.  Only identifiable pathology was a wide based skin tag wit ha bleeding base.  After exparel anesthetic was instilled an elliptical incision was completed of the base including the bleeding area.  The mucosal and skin defect was closed with a running vicryl suture in radial fashion.  Anal canal was irrigated and suctioned dry.  Hemostasis was noted, gelfoam anal plug was placed.   The patient is a 64y Female complaining of multiple medical complaints. The remainder of the exparel was instilled.  The perineum was then cleaned and dried, gauze and surgical undergarments placed and patient was transferred in stable condition to the PACU.  Please noted that all needle and sponge counts were correct at the end of the case.

## 2022-06-16 ENCOUNTER — APPOINTMENT (OUTPATIENT)
Dept: UROLOGY | Facility: CLINIC | Age: 67
End: 2022-06-16
Payer: MEDICAID

## 2022-06-16 ENCOUNTER — OUTPATIENT (OUTPATIENT)
Dept: OUTPATIENT SERVICES | Facility: HOSPITAL | Age: 67
LOS: 1 days | Discharge: HOME | End: 2022-06-16
Payer: MEDICAID

## 2022-06-16 ENCOUNTER — RESULT REVIEW (OUTPATIENT)
Age: 67
End: 2022-06-16

## 2022-06-16 DIAGNOSIS — R31.29 OTHER MICROSCOPIC HEMATURIA: ICD-10-CM

## 2022-06-16 DIAGNOSIS — R10.9 UNSPECIFIED ABDOMINAL PAIN: ICD-10-CM

## 2022-06-16 DIAGNOSIS — N20.0 CALCULUS OF KIDNEY: ICD-10-CM

## 2022-06-16 DIAGNOSIS — Z87.442 PERSONAL HISTORY OF URINARY CALCULI: ICD-10-CM

## 2022-06-16 DIAGNOSIS — Z90.49 ACQUIRED ABSENCE OF OTHER SPECIFIED PARTS OF DIGESTIVE TRACT: Chronic | ICD-10-CM

## 2022-06-16 DIAGNOSIS — N20.1 CALCULUS OF URETER: ICD-10-CM

## 2022-06-16 DIAGNOSIS — Z90.710 ACQUIRED ABSENCE OF BOTH CERVIX AND UTERUS: Chronic | ICD-10-CM

## 2022-06-16 DIAGNOSIS — Z98.890 OTHER SPECIFIED POSTPROCEDURAL STATES: Chronic | ICD-10-CM

## 2022-06-16 PROCEDURE — 99214 OFFICE O/P EST MOD 30 MIN: CPT

## 2022-06-16 PROCEDURE — 74019 RADEX ABDOMEN 2 VIEWS: CPT | Mod: 26

## 2022-06-16 PROCEDURE — 76775 US EXAM ABDO BACK WALL LIM: CPT | Mod: 26

## 2022-06-16 NOTE — ASSESSMENT
[FreeTextEntry1] : BEBA HAND is a 65 year old female who presents for consultation for ureteral stone and septic shock sp cystoscopy left ureteral stent insertion with esbl ecoli bacteremia sp left ureteroscopy laser lithotripsy ureteral stent exchange. ureteral stone successfully removed and part of large renal stone, unable to access anterior calyceal ~8mm stone sp left ESWL 3/2021.\par \par Renal sono reasess stone burden\par KUB \par UA confirm resolution of microscopic hematuria from 2021\par We discussed dietary stone prevention and in the future she has continued nephrolithiasis will recommend Litholink\par \par \par Stone nutritional counseling given--First and foremost, the most important recommendation is to increase water intake. I have recommended that she drink enough water to produce 2.5L of urine per day. More easily put, I have recommended the patient consume enough water to keep Her urine clear. I have also recommended adding crystal light (or lemon) which contains citrate which prevents stone formation. I have also stressed the importance of minimizing salt and animal protein in the diet.\par

## 2022-06-16 NOTE — HISTORY OF PRESENT ILLNESS
[FreeTextEntry1] : BEBA HAND is a 65 year old female who presents for consultation for ureteral stone and septic shock sp cystoscopy left ureteral stent insertion with esbl ecoli bacteremia sp left ureteroscopy laser lithotripsy ureteral stent exchange. ureteral stone successfully removed and part of large renal stone, unable to access anterior calyceal ~8mm stone sp left ESWL 3/2021.\par \par Doing well no more hematuria no f/c. no flank pain.  History obtained with the assistance of patient's son who preferred to serve as .\par \par Fluoroscopy images visualized showing residual stone burden and stent adjacent 2020\par \par Prev She presented with a ureteral stone and septic shock October 28, 2020 [facility] emergency room her blood pressure was 70/30.\par Blood cultures in the hospital grew out ESBL E. coli.\par \par CT abdomen pelvis images October 2020 showing left hydroureteronephrosis to the level of a 5 mm ureterovesical junction stone on the left side.  There are also nonobstructing left renal stones 1 to 1.5 cm in the midpole, visible on . no renal stones on right.\par Denies  PMH including previous kidney stones, recurrent UTIs. \par Family History: No  malignancies\par Social History:from [place]\par

## 2022-07-28 ENCOUNTER — APPOINTMENT (OUTPATIENT)
Dept: UROLOGY | Facility: CLINIC | Age: 67
End: 2022-07-28

## 2022-07-28 VITALS
HEART RATE: 77 BPM | WEIGHT: 152 LBS | BODY MASS INDEX: 27.97 KG/M2 | DIASTOLIC BLOOD PRESSURE: 63 MMHG | HEIGHT: 62 IN | SYSTOLIC BLOOD PRESSURE: 135 MMHG

## 2022-07-28 PROCEDURE — 99214 OFFICE O/P EST MOD 30 MIN: CPT

## 2022-07-28 NOTE — ASSESSMENT
[FreeTextEntry1] : BEBA HAND is a 67 year old female who presents for consultation for ureteral stone and septic shock sp cystoscopy left ureteral stent insertion with esbl ecoli bacteremia sp left ureteroscopy laser lithotripsy ureteral stent exchange. ureteral stone successfully removed and part of large renal stone, unable to access anterior calyceal ~8mm stone sp left ESWL 3/2021.\par \par Renal sono June 2022 reveals possible bilateral 5 mm nonobstructing calculus.  Not definitively seen on KUB.  No hydronephrosis bilaterally.  Patient elects observation of these potential small stones.\par \par Recommend that patient comply with nutritional counseling given in prior visits.  Stressed the importance of adequate hydration being approximately 8 to 12, 8 ounces of water daily.  Discussed importance of squeezing lemon juice and water to increase urine citrate which can help prevent stone formation.  Patient verbalized understanding.\par \par Plan\par \par -24 hour Urine via litholink\par -Follow up 3 months to review \par We will reimage consider CT low-dose 6 to 12 months

## 2022-07-28 NOTE — REVIEW OF SYSTEMS
[see HPI] : see HPI [Fever] : no fever [Chills] : no chills [Chest Pain] : no chest pain [Abdominal Pain] : no abdominal pain [Shortness Of Breath] : no shortness of breath [Vomiting] : no vomiting

## 2022-07-28 NOTE — HISTORY OF PRESENT ILLNESS
[FreeTextEntry1] : SAMSON HAND is a 67 year old female who presents for consultation for ureteral stone and septic shock sp cystoscopy left ureteral stent insertion with esbl ecoli bacteremia sp left ureteroscopy laser lithotripsy ureteral stent exchange. ureteral stone successfully removed and part of large renal stone, unable to access anterior calyceal ~8mm stone sp left ESWL 3/2021.\par \par Patient presents to office today accompanied by her son (translates for patient) to review recent sonogram and KUB x-ray.  Patient reports feeling well.  Denies hematuria, denies flank pain.  Patient does report that her urine on occasion appears dark yellow.  Patient states she is not drinking enough water.\par \par Renal sonogram done June 16, 2022.  Bilateral echogenic foci measuring up to 5 mm, possibly representing nonobstructing renal calculi.  No hydronephrosis bilaterally. images visualized \par KUB x-ray reveals limited evaluation of renal calculi due to overlying bowel.  No definitive calcifications seen overlying the expected regions of the kidneys within these constraints.\par \par Previosly:\par She presented with a ureteral stone and septic shock October 28, 2020 [facility] emergency room her blood pressure was 70/30.\par Blood cultures in the hospital grew out ESBL E. coli.\par \par CT abdomen pelvis images October 2020 showing left hydroureteronephrosis to the level of a 5 mm ureterovesical junction stone on the left side. There are also nonobstructing left renal stones 1 to 1.5 cm in the midpole, visible on . no renal stones on right.\par \par Denies  PMH including previous kidney stones, recurrent UTIs. \par Family History: No  malignancies\par Social History:from [place]\par

## 2022-08-06 NOTE — ED PROVIDER NOTE - CARE PLAN
Principal Discharge DX:	Rib contusion, left, initial encounter Principal Discharge DX:	Rib contusion, left, initial encounter  Secondary Diagnosis:	Wrist contusion Yes

## 2022-08-17 NOTE — H&P PST ADULT - ADDITIONAL PE
FROM neck, no loose teeth, full upper denture, partial lower, unable to examine abdomen due to body habitus Rhombic Flap Text: The defect edges were debeveled with a #15 scalpel blade.  Given the location of the defect and the proximity to free margins a rhombic flap was deemed most appropriate.  Using a sterile surgical marker, an appropriate rhombic flap was drawn incorporating the defect.    The area thus outlined was incised deep to adipose tissue with a #15 scalpel blade.  The skin margins were undermined to an appropriate distance in all directions utilizing iris scissors.

## 2022-09-19 ENCOUNTER — EMERGENCY (EMERGENCY)
Facility: HOSPITAL | Age: 67
LOS: 0 days | Discharge: HOME | End: 2022-09-19
Attending: EMERGENCY MEDICINE | Admitting: EMERGENCY MEDICINE

## 2022-09-19 VITALS
HEART RATE: 74 BPM | SYSTOLIC BLOOD PRESSURE: 146 MMHG | TEMPERATURE: 98 F | RESPIRATION RATE: 18 BRPM | DIASTOLIC BLOOD PRESSURE: 65 MMHG | HEIGHT: 59 IN | WEIGHT: 156.09 LBS | OXYGEN SATURATION: 99 %

## 2022-09-19 DIAGNOSIS — Z98.890 OTHER SPECIFIED POSTPROCEDURAL STATES: Chronic | ICD-10-CM

## 2022-09-19 DIAGNOSIS — Z90.710 ACQUIRED ABSENCE OF BOTH CERVIX AND UTERUS: Chronic | ICD-10-CM

## 2022-09-19 DIAGNOSIS — Z90.49 ACQUIRED ABSENCE OF OTHER SPECIFIED PARTS OF DIGESTIVE TRACT: Chronic | ICD-10-CM

## 2022-09-19 PROCEDURE — 71046 X-RAY EXAM CHEST 2 VIEWS: CPT | Mod: 26,59

## 2022-09-19 PROCEDURE — 71101 X-RAY EXAM UNILAT RIBS/CHEST: CPT | Mod: 26,RT

## 2022-09-19 PROCEDURE — 99284 EMERGENCY DEPT VISIT MOD MDM: CPT

## 2022-09-19 RX ORDER — IBUPROFEN 200 MG
600 TABLET ORAL ONCE
Refills: 0 | Status: COMPLETED | OUTPATIENT
Start: 2022-09-19 | End: 2022-09-19

## 2022-09-19 RX ADMIN — Medication 600 MILLIGRAM(S): at 12:14

## 2022-09-19 RX ADMIN — Medication 600 MILLIGRAM(S): at 12:45

## 2022-09-19 NOTE — ED PROVIDER NOTE - NS ED ROS FT
Constitutional: No fever, chills.  Eyes:  No visual changes  ENMT:  No neck pain  Cardiac:  No chest pain  Respiratory:  No cough, SOB  GI:  No nausea, vomiting, diarrhea, abdominal pain.  :  No dysuria, hematuria  MS:  (+)R rib pain. No back pain.  Neuro:  No headache or lightheadedness  Skin:  No skin rash  Endocrine: No history of thyroid disease or diabetes.  Except as documented in the HPI,  all other systems are negative.

## 2022-09-19 NOTE — ED PROVIDER NOTE - OBJECTIVE STATEMENT
66 yo F no pmhs presenting to the ED for evaluation of R rib pain s/p mechanical trip and fall [place]. Pt landed on her R side states she has R chest wall/rib pain since that time, achy in nature, constant, worse with palpation. denies any head injury or loc, not on anticoags. denies fever, chills, numbness/tingling, weakness, neck pain, back pain.

## 2022-09-19 NOTE — ED ADULT NURSE NOTE - OBJECTIVE STATEMENT
pt c/op right rib pain s/p fall. pt states her binocular were on her chest and she landed on them upon falling.

## 2022-09-19 NOTE — ED PROVIDER NOTE - ATTENDING APP SHARED VISIT CONTRIBUTION OF CARE
67-year-old female status post mechanical fall 2 days ago while wearing binoculars over her chest and landed on her chest, with resulting pain to the right breast.    No shortness of breath, no abdominal pain, no vomiting, no hematuria,    vss, nontoxic, well appearing, airway intact, GCS 15, PERRL, EOMI, MMM, no cervical-thoracic-lumbar spine tenderness, neck supple, CTAB, no crepitus over chest wall, exam with chaperone, no ecchymosis to right breast, mild tenderness to palpation over the ribs on the right side without crepitus, RRR, equal radial pulses bilat, abd soft/nt/nd, no fnd. FROMx4, no ecchymosis    X-ray NSAIDs.   utilized during the conversation

## 2022-09-19 NOTE — ED PROVIDER NOTE - PATIENT PORTAL LINK FT
You can access the FollowMyHealth Patient Portal offered by [facility] by registering at the following website: [email or web address]. By joining [employer]’s FollowMyHealth portal, you will also be able to view your health information using other applications (apps) compatible with our system.

## 2022-09-19 NOTE — ED PROVIDER NOTE - NS ED ATTENDING STATEMENT MOD
This was a shared visit with the [facility]. I reviewed and verified the documentation and independently performed the documented:

## 2022-09-19 NOTE — ED PROVIDER NOTE - PHYSICAL EXAMINATION
GENERAL: Well-nourished, Well-developed. NAD.  HEAD: No visible or palpable bumps or hematomas. No ecchymosis behind ears B/L.  Eyes: PERRLA, EOMI. No asymmetry. No nystagmus. No conjunctival injection. Non-icteric sclera.  ENMT: MMM.   Neck: Supple. No cervical midline TTP. No paravertebral TTP to traps. FROM  CVS: (+)R reproducible chest wall tenderness. Normal S1,S2. No murmurs appreciated on auscultation   RESP: No use of accessory muscles. Chest rise symmetrical with good expansion. Lungs clear to auscultation B/L. No wheezing, rales, or rhonchi auscultated.  GI: Normal auscultation of bowel sounds in all 4 quadrants. Soft, Nontender, Nondistended. No guarding or rebound tenderness. No CVAT B/L.  MSK: Extremities w/o deformity or ttp. No visible signs of trauma such as ecchymosis, erythema, or swelling. FROM of upper and lower extremities B/L. No midline spinal TTP. FROM of back with flexion and extension.  Skin: Warm, Dry. No rashes or lesions. Good cap refill < 2 sec B/L.  EXT: Radial and pedal pulses present B/L. No calf tenderness or swelling B/L. No palpable cords. No pedal edema B/L.

## 2022-09-22 DIAGNOSIS — S20.01XA CONTUSION OF RIGHT BREAST, INITIAL ENCOUNTER: ICD-10-CM
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
## 2022-09-22 DIAGNOSIS — S20.211A CONTUSION OF RIGHT FRONT WALL OF THORAX, INITIAL ENCOUNTER: ICD-10-CM
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
## 2022-09-22 DIAGNOSIS — R07.81 PLEURODYNIA: ICD-10-CM
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
## 2022-09-26 ENCOUNTER — APPOINTMENT (OUTPATIENT)
Dept: VASCULAR SURGERY | Facility: CLINIC | Age: 67
End: 2022-09-26

## 2022-09-26 VITALS — HEIGHT: 62 IN | BODY MASS INDEX: 28.89 KG/M2 | WEIGHT: 157 LBS

## 2022-09-26 PROCEDURE — 99214 OFFICE O/P EST MOD 30 MIN: CPT

## 2022-09-26 PROCEDURE — 93970 EXTREMITY STUDY: CPT

## 2022-09-26 NOTE — HISTORY OF PRESENT ILLNESS
[FreeTextEntry1] : 66 y/o female presents for evaluation of lower extremity varicose veins, c/o leg pain and heaviness after walking, worse on right leg, also c/o right knee pain.

## 2022-09-26 NOTE — ASSESSMENT
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

## 2022-10-20 ENCOUNTER — APPOINTMENT (OUTPATIENT)
Dept: UROLOGY | Facility: CLINIC | Age: 67
End: 2022-10-20

## 2022-10-20 ENCOUNTER — LABORATORY RESULT (OUTPATIENT)
Age: 67
End: 2022-10-20

## 2022-10-20 VITALS
WEIGHT: 157 LBS | HEART RATE: 69 BPM | SYSTOLIC BLOOD PRESSURE: 131 MMHG | DIASTOLIC BLOOD PRESSURE: 78 MMHG | BODY MASS INDEX: 28.89 KG/M2 | TEMPERATURE: 98.2 F | HEIGHT: 62 IN | OXYGEN SATURATION: 98 % | RESPIRATION RATE: 18 BRPM

## 2022-10-20 DIAGNOSIS — N13.30 UNSPECIFIED HYDRONEPHROSIS: ICD-10-CM

## 2022-10-20 PROCEDURE — 99215 OFFICE O/P EST HI 40 MIN: CPT

## 2022-10-20 NOTE — ASSESSMENT
[FreeTextEntry1] : BEBA HAND is a 67 year old female who presents for consultation for ureteral stone and septic shock sp cystoscopy left ureteral stent insertion with esbl ecoli bacteremia sp left ureteroscopy laser lithotripsy ureteral stent exchange. ureteral stone successfully removed and part of large renal stone, unable to access anterior calyceal ~8mm stone sp left ESWL 3/2021.  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  Recommending she discuss with nephrology.\par \par Follow-up with me in 6 months renal sono and KUB, will reassess stone burden.  Patient elects observation of the small nonobstructing stones\par \par Plan \par -U/A,UCX \par -PTH testing today\par -repeat litholink \par -refer to nephrology \par -f/u in 6 months \par sono kub\par

## 2022-10-20 NOTE — HISTORY OF PRESENT ILLNESS
[FreeTextEntry1] : BEBA HAND is a 67 year old female who presents for consultation for ureteral stone and septic shock sp cystoscopy left ureteral stent insertion with esbl ecoli bacteremia sp left ureteroscopy laser lithotripsy ureteral stent exchange. ureteral stone successfully removed and part of large renal stone, unable to access anterior calyceal ~8mm stone sp left ESWL 3/2021.\par \par Pt denies gross hematuria ,dysuria and associated symptoms at this time .Pt doesn't take any vitamins . Her son was called and served as an  during today consultation  \par \par Litholink 10/22 - suboptimal urine volume;hypercalciuria,Borderline high urine PH,Mild Hyperuricosuria,Mild CaOX stone risk and high CaP stone risk \par \par previously \par Renal sonogram done June 16, 2022.  Bilateral echogenic foci measuring up to 5 mm, possibly representing nonobstructing renal calculi.  No hydronephrosis bilaterally. images visualized \par KUB x-ray reveals limited evaluation of renal calculi due to overlying bowel.  No definitive calcifications seen overlying the expected regions of the kidneys within these constraints.\par \par She presented with a ureteral stone and septic shock October 28, 2020 [facility] emergency room her blood pressure was 70/30.\par Blood cultures in the hospital grew out ESBL E. coli.\par \par CT abdomen pelvis images October 2020 showing left hydroureteronephrosis to the level of a 5 mm ureterovesical junction stone on the left side. There are also nonobstructing left renal stones 1 to 1.5 cm in the midpole, visible on . no renal stones on right.\par \par Denies  PMH including previous kidney stones, recurrent UTIs. \par Family History: No  malignancies\par Social History:from [place]\par

## 2022-10-20 NOTE — ADDENDUM
[FreeTextEntry1] : Patient's note was transcribed with the assistance of a medical scribe under the supervision of Dr. Salgado.\par I, Dr. Salgado, have reviewed the patient's chart and agree that it aligns with my medical decisions.\par Myrna Peter, our scribe, also served as a chaperone for physical examination purposes.\par \par \par

## 2022-10-25 ENCOUNTER — NON-APPOINTMENT (OUTPATIENT)
Age: 67
End: 2022-10-25

## 2022-10-25 DIAGNOSIS — N39.0 URINARY TRACT INFECTION, SITE NOT SPECIFIED: ICD-10-CM

## 2022-11-01 ENCOUNTER — NON-APPOINTMENT (OUTPATIENT)
Age: 67
End: 2022-11-01

## 2022-11-01 LAB
ANION GAP SERPL CALC-SCNC: 11 MMOL/L
BUN SERPL-MCNC: 16 MG/DL
CALCIUM SERPL-MCNC: 9.4 MG/DL
CALCIUM SERPL-MCNC: 9.8 MG/DL
CHLORIDE SERPL-SCNC: 105 MMOL/L
CO2 SERPL-SCNC: 25 MMOL/L
CREAT SERPL-MCNC: 0.7 MG/DL
EGFR: 95 ML/MIN/1.73M2
GLUCOSE SERPL-MCNC: 114 MG/DL
PARATHYROID HORMONE INTACT: 39 PG/ML
POTASSIUM SERPL-SCNC: 4.7 MMOL/L
SODIUM SERPL-SCNC: 141 MMOL/L
URATE SERPL-MCNC: 3.3 MG/DL

## 2022-11-23 ENCOUNTER — APPOINTMENT (OUTPATIENT)
Dept: VASCULAR SURGERY | Facility: CLINIC | Age: 67
End: 2022-11-23

## 2022-12-09 ENCOUNTER — TRANSCRIPTION ENCOUNTER (OUTPATIENT)
Age: 67
End: 2022-12-09

## 2022-12-09 ENCOUNTER — EMERGENCY (EMERGENCY)
Facility: HOSPITAL | Age: 67
LOS: 0 days | Discharge: HOME | End: 2022-12-09
Attending: STUDENT IN AN ORGANIZED HEALTH CARE EDUCATION/TRAINING PROGRAM | Admitting: STUDENT IN AN ORGANIZED HEALTH CARE EDUCATION/TRAINING PROGRAM

## 2022-12-09 VITALS
TEMPERATURE: 97 F | OXYGEN SATURATION: 99 % | DIASTOLIC BLOOD PRESSURE: 74 MMHG | SYSTOLIC BLOOD PRESSURE: 132 MMHG | RESPIRATION RATE: 20 BRPM | HEART RATE: 75 BPM

## 2022-12-09 DIAGNOSIS — R51.9 HEADACHE, UNSPECIFIED: ICD-10-CM

## 2022-12-09 DIAGNOSIS — R42 DIZZINESS AND GIDDINESS: ICD-10-CM

## 2022-12-09 DIAGNOSIS — Z90.49 ACQUIRED ABSENCE OF OTHER SPECIFIED PARTS OF DIGESTIVE TRACT: Chronic | ICD-10-CM

## 2022-12-09 DIAGNOSIS — Z90.710 ACQUIRED ABSENCE OF BOTH CERVIX AND UTERUS: Chronic | ICD-10-CM

## 2022-12-09 DIAGNOSIS — Z98.890 OTHER SPECIFIED POSTPROCEDURAL STATES: Chronic | ICD-10-CM

## 2022-12-09 LAB
ALBUMIN SERPL ELPH-MCNC: 4.8 G/DL — SIGNIFICANT CHANGE UP (ref 3.5–5.2)
ALP SERPL-CCNC: 127 U/L — HIGH (ref 30–115)
ALT FLD-CCNC: 15 U/L — SIGNIFICANT CHANGE UP (ref 0–41)
ANION GAP SERPL CALC-SCNC: 8 MMOL/L — SIGNIFICANT CHANGE UP (ref 7–14)
AST SERPL-CCNC: 20 U/L — SIGNIFICANT CHANGE UP (ref 0–41)
BASOPHILS # BLD AUTO: 0.01 K/UL — SIGNIFICANT CHANGE UP (ref 0–0.2)
BASOPHILS NFR BLD AUTO: 0.2 % — SIGNIFICANT CHANGE UP (ref 0–1)
BILIRUB SERPL-MCNC: 0.3 MG/DL — SIGNIFICANT CHANGE UP (ref 0.2–1.2)
BUN SERPL-MCNC: 14 MG/DL — SIGNIFICANT CHANGE UP (ref 10–20)
CALCIUM SERPL-MCNC: 9.8 MG/DL — SIGNIFICANT CHANGE UP (ref 8.4–10.4)
CHLORIDE SERPL-SCNC: 105 MMOL/L — SIGNIFICANT CHANGE UP (ref 98–110)
CO2 SERPL-SCNC: 27 MMOL/L — SIGNIFICANT CHANGE UP (ref 17–32)
CREAT SERPL-MCNC: 0.6 MG/DL — LOW (ref 0.7–1.5)
EGFR: 98 ML/MIN/1.73M2 — SIGNIFICANT CHANGE UP
EOSINOPHIL # BLD AUTO: 0.17 K/UL — SIGNIFICANT CHANGE UP (ref 0–0.7)
EOSINOPHIL NFR BLD AUTO: 3.8 % — SIGNIFICANT CHANGE UP (ref 0–8)
GLUCOSE SERPL-MCNC: 101 MG/DL — HIGH (ref 70–99)
HCT VFR BLD CALC: 39.7 % — SIGNIFICANT CHANGE UP (ref 37–47)
HGB BLD-MCNC: 13.7 G/DL — SIGNIFICANT CHANGE UP (ref 12–16)
IMM GRANULOCYTES NFR BLD AUTO: 0.2 % — SIGNIFICANT CHANGE UP (ref 0.1–0.3)
LYMPHOCYTES # BLD AUTO: 1.95 K/UL — SIGNIFICANT CHANGE UP (ref 1.2–3.4)
LYMPHOCYTES # BLD AUTO: 43.3 % — SIGNIFICANT CHANGE UP (ref 20.5–51.1)
MCHC RBC-ENTMCNC: 30.7 PG — SIGNIFICANT CHANGE UP (ref 27–31)
MCHC RBC-ENTMCNC: 34.5 G/DL — SIGNIFICANT CHANGE UP (ref 32–37)
MCV RBC AUTO: 89 FL — SIGNIFICANT CHANGE UP (ref 81–99)
MONOCYTES # BLD AUTO: 0.42 K/UL — SIGNIFICANT CHANGE UP (ref 0.1–0.6)
MONOCYTES NFR BLD AUTO: 9.3 % — SIGNIFICANT CHANGE UP (ref 1.7–9.3)
NEUTROPHILS # BLD AUTO: 1.94 K/UL — SIGNIFICANT CHANGE UP (ref 1.4–6.5)
NEUTROPHILS NFR BLD AUTO: 43.2 % — SIGNIFICANT CHANGE UP (ref 42.2–75.2)
NRBC # BLD: 0 /100 WBCS — SIGNIFICANT CHANGE UP (ref 0–0)
PLATELET # BLD AUTO: 205 K/UL — SIGNIFICANT CHANGE UP (ref 130–400)
POTASSIUM SERPL-MCNC: 4.5 MMOL/L — SIGNIFICANT CHANGE UP (ref 3.5–5)
POTASSIUM SERPL-SCNC: 4.5 MMOL/L — SIGNIFICANT CHANGE UP (ref 3.5–5)
PROT SERPL-MCNC: 7.4 G/DL — SIGNIFICANT CHANGE UP (ref 6–8)
RBC # BLD: 4.46 M/UL — SIGNIFICANT CHANGE UP (ref 4.2–5.4)
RBC # FLD: 12 % — SIGNIFICANT CHANGE UP (ref 11.5–14.5)
SODIUM SERPL-SCNC: 140 MMOL/L — SIGNIFICANT CHANGE UP (ref 135–146)
TROPONIN T SERPL-MCNC: <0.01 NG/ML — SIGNIFICANT CHANGE UP
WBC # BLD: 4.5 K/UL — LOW (ref 4.8–10.8)
WBC # FLD AUTO: 4.5 K/UL — LOW (ref 4.8–10.8)

## 2022-12-09 PROCEDURE — 70450 CT HEAD/BRAIN W/O DYE: CPT | Mod: 26,MA

## 2022-12-09 PROCEDURE — 99285 EMERGENCY DEPT VISIT HI MDM: CPT

## 2022-12-09 PROCEDURE — 93010 ELECTROCARDIOGRAM REPORT: CPT

## 2022-12-09 RX ORDER — MECLIZINE HCL 12.5 MG
25 TABLET ORAL ONCE
Refills: 0 | Status: COMPLETED | OUTPATIENT
Start: 2022-12-09 | End: 2022-12-09

## 2022-12-09 RX ORDER — SODIUM CHLORIDE 9 MG/ML
1000 INJECTION, SOLUTION INTRAVENOUS ONCE
Refills: 0 | Status: COMPLETED | OUTPATIENT
Start: 2022-12-09 | End: 2022-12-09

## 2022-12-09 RX ORDER — METOCLOPRAMIDE HCL 10 MG
10 TABLET ORAL ONCE
Refills: 0 | Status: COMPLETED | OUTPATIENT
Start: 2022-12-09 | End: 2022-12-09

## 2022-12-09 RX ADMIN — Medication 25 MILLIGRAM(S): at 15:11

## 2022-12-09 RX ADMIN — Medication 10 MILLIGRAM(S): at 15:11

## 2022-12-09 RX ADMIN — SODIUM CHLORIDE 1000 MILLILITER(S): 9 INJECTION, SOLUTION INTRAVENOUS at 15:11

## 2022-12-09 NOTE — ED PROVIDER NOTE - PATIENT PORTAL LINK FT
You can access the FollowMyHealth Patient Portal offered by [facility] by registering at the following website: [email or web address]. By joining [email or web address]’s FollowMyHealth portal, you will also be able to view your health information using other applications (apps) compatible with our system.

## 2022-12-09 NOTE — ED PROVIDER NOTE - ATTENDING CONTRIBUTION TO CARE
67-year-old female no past medical history presents with headache.  Patient endorsing mild frontal headache.  States that she had an episode at home of vertigo that lasted a few seconds and resolved completely approximately 2 days ago.  Patient states that headache is currently improving however has not gone away completely.  No nausea/vomiting/diarrhea, no trauma, no vision change, no neck pain/stiffness, no difficulty ambulating, no weakness, no facial droop, no speech slurring, no change in mental status, no abdominal pain, no chest pain, shortness of breath.    CONSTITUTIONAL: Well-developed; well-nourished; in no acute distress. Sitting up and providing appropriate history and physical examination  SKIN: skin exam is warm and dry, no acute rash.  HEAD: Normocephalic; atraumatic.  EYES: PERRL, 3 mm bilateral, no nystagmus, EOM intact; conjunctiva and sclera clear.  ENT: No nasal discharge; airway clear.  NECK: Supple; non tender. + full passive ROM in all directions. No JVD  CARD: S1, S2 normal; no murmurs, gallops, or rubs. Regular rate and rhythm. + Symmetric Strong Pulses  RESP: No wheezes, rales or rhonchi. Good air movement bilaterally  ABD: soft; non-distended; non-tender. No Rebound, No Guarding, No signs of peritonitis, No CVA tenderness. No pulsatile abdominal mass. + Strong and Symmetric Pulses  EXT: Normal ROM. No clubbing, cyanosis or edema. Dp and Pt Pulses intact. Cap refill less than 3 seconds  NEURO: CN 2-12 intact, normal finger to nose, normal romberg, stable gait, no sensory or motor deficits, Alert, oriented, grossly unremarkable. No Focal deficits. GCS 15. NIH 0  PSYCH: Cooperative, appropriate.

## 2022-12-09 NOTE — ED PROVIDER NOTE - CLINICAL SUMMARY MEDICAL DECISION MAKING FREE TEXT BOX
I personally evaluated the patient. I reviewed the Resident´s or Physician Assistant´s note (as assigned above), and agree with the findings and plan except as documented in my note.  Patient evaluated for headache.  Labs, CT head, EKG performed in the ED.  Patient given medication for headache with improvement in symptoms.  Patient neurologically intact and well-appearing, instructed to follow-up with neurology.  I have fully discussed the medical management and delivery of care with the patient. I have discussed any available labs, imaging and treatment options with the patient. Patient confirms understanding and has been given detailed return precautions. Patient instructed to return to the ED should symptoms persist or worsen. Patient has demonstrated capacity and has verbalized understanding. Patient is well appearing upon discharge.

## 2022-12-09 NOTE — ED PROVIDER NOTE - CARE PROVIDER_API CALL
Juan Luis Pickard)  EEGEpilepsy; Neurology  [address] [place] [address], [address]  [place] [address]  Phone: [phone]  Fax: [phone]  Follow Up Time: 4-6 Days

## 2022-12-09 NOTE — ED PROVIDER NOTE - OBJECTIVE STATEMENT
67-year-old female no past medical history presents with headache.  Patient endorsing mild frontal headache.  States that she had an episode at home of vertigo that lasted a few seconds and resolved completely approximately 2 days ago.  Patient states that headache is currently improving however has not gone away completely.  No nausea/vomiting/diarrhea, no trauma, no vision change, no neck pain/stiffness, no difficulty ambulating, no weakness, no facial droop, no speech slurring, no change in mental status, no abdominal pain, no chest pain, shortness of breath.

## 2022-12-16 NOTE — ASU PREOP CHECKLIST - HEART RATE (BEATS/MIN)
Goal Outcome Evaluation:    POD 1 S/p T10-S1 posterior segmental instrumentation, cement augmentation T10-T11, L2-S1 bilateral decompression and interbody fusion 12/15/22.     A&O x 4 . CMS intact w/ exception to absent dorsiflexion in bilateral feet. Patient reports left side was absent prior to surgery, but right foot is new post surgery. Weak pulse palpated on right dorsalis pedis.     Bowel sounds active, not passing flatus, tolerating regular diet. Per patient report, last BM 12/14/22. Acosta catheter removed at 1400. Urinal at bedside. Bladder scan @1745 was 28mL. Pt voided 200mL - no residual.    Patient VSS w/ exception to BP and HR. Patient was hypotensive and tachycardic this shift. Hospitalist consult ordered. EKG was completed. Patient was subsequently placed on telemetry monitoring and received IVF bolus as ordered. Patient currently on continuous IVF @75mL/hr. Encouraging oral fluid intake. Patient has labs due 12/17/22 AM.    Surgical dressing is intact, dry drainage noted - outlined w/ marker - no new drainage observed this shift. Hemovac x 2 currently draining.    Up with 2 assist. Spent approx 2 hours in chair. C/o back pain 8-9/10 that worsens w/ movement. Pain was managed w/ scheduled acetaminophen, and PRN robaxin, oxycodone, and IV dilaudid this shift.   83

## 2023-03-09 NOTE — ED ADULT NURSE NOTE - IS THE PATIENT ABLE TO BE SCREENED?
called The Admissions Director at [facility] to do a follow up Social service call with her to see where we are in the Process of Patient being admitted to their facility. No one answered the phone,  left a voicemail asking for a return call. Yes

## 2023-03-20 NOTE — ED ADULT NURSE NOTE - OBJECTIVE STATEMENT
63 y/o female brought in for pain secondary to fall on 6/24. patient tripped on metal fence outside her house landed on  bilateral hands no bruising or deformity did not hit head, no anticoagulant use. patient now complaining of left upper quadrant pain, left back, left chest tender to touch not sharp. 63 y/o female brought in for pain secondary to fall on 6/24. patient tripped on metal fence outside her house landed on bilateral hands, left breast, left forearm. Patient did not hit head, no anticoagulant use, no loc, no nausea, no vomiting after fall. patient now complaining of left chest pain, left back, left forearm. patient denies headache, sob, dizziness, sharp chest pain. Mart-1 - Positive Histology Text: MART-1 staining demonstrates areas of higher density and clustering of melanocytes with Pagetoid spread upwards within the epidermis. The surgical margins are positive for tumor cells.

## 2023-04-18 ENCOUNTER — APPOINTMENT (OUTPATIENT)
Dept: UROLOGY | Facility: CLINIC | Age: 68
End: 2023-04-18
Payer: MEDICAID

## 2023-04-18 VITALS
HEIGHT: 62 IN | SYSTOLIC BLOOD PRESSURE: 140 MMHG | WEIGHT: 160 LBS | BODY MASS INDEX: 29.44 KG/M2 | DIASTOLIC BLOOD PRESSURE: 80 MMHG

## 2023-04-18 DIAGNOSIS — R82.71 BACTERIURIA: ICD-10-CM

## 2023-04-18 DIAGNOSIS — M54.50 LOW BACK PAIN, UNSPECIFIED: ICD-10-CM

## 2023-04-18 PROCEDURE — 99214 OFFICE O/P EST MOD 30 MIN: CPT

## 2023-04-18 NOTE — ASSESSMENT
[FreeTextEntry1] : BEBA HAND is a 67 year old female who presents for consultation for ureteral stone and septic shock sp cystoscopy left ureteral stent insertion with esbl ecoli bacteremia sp left ureteroscopy laser lithotripsy ureteral stent exchange. ureteral stone successfully removed and part of large renal stone, unable to access anterior calyceal ~8mm stone sp left ESWL 3/2021.  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  Recommending she discuss with nephrology.\par -refer to nephrology for stone prevention,  litholink prior  \par \par KUB sono still pending reasess stones   Patient elects observation of the small nonobstructing stones\par No treatment necessary for asymptomatic bacteriuria

## 2023-04-18 NOTE — HISTORY OF PRESENT ILLNESS
[FreeTextEntry1] : BEBA HAND is a 67 year old female who presents for consultation for ureteral stone and septic shock sp cystoscopy left ureteral stent insertion with esbl ecoli bacteremia sp left ureteroscopy laser lithotripsy ureteral stent exchange. ureteral stone successfully removed and part of large renal stone, unable to access anterior calyceal ~8mm stone sp left ESWL 3/2021.  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 She denies any gross hematuria,dysuria or associated symptoms and has no symptoms of a UTI although she had bacteriuria.  Denies any frequency or urgency\par \par BMP 10/2022 : creatinine 0.7// eGFR 95// \par Uric Acid 11/2022 : 3.3 \par PTH 11/2022 \par \par KUB images visualized from 6/22 agree no stones seen x-ray reveals limited evaluation of renal calculi due to overlying bowel.  No definitive calcifications seen overlying the expected regions of the kidneys within these constraints.\par \par previously \par Litholink 10/22 - suboptimal urine volume;hypercalciuria,Borderline high urine PH,Mild Hyperuricosuria,Mild CaOX stone risk and high CaP stone risk \par \par Renal sonogram done June 16, 2022.  Bilateral echogenic foci measuring up to 5 mm, possibly representing nonobstructing renal calculi.  No hydronephrosis bilaterally. images visualized \par \par She presented with a ureteral stone and septic shock October 28, 2020 [facility] emergency room her blood pressure was 70/30.\par Blood cultures in the hospital grew out ESBL E. coli.\par \par CT abdomen pelvis images October 2020 showing left hydroureteronephrosis to the level of a 5 mm ureterovesical junction stone on the left side. There are also nonobstructing left renal stones 1 to 1.5 cm in the midpole, visible on . no renal stones on right.\par \par Denies  PMH including previous kidney stones, recurrent UTIs. \par Family History: No  malignancies\par Social History:from [place]\par  Area M Indication Text: Tumors in this location are included in Area M (cheek, forehead, scalp, neck, jawline and pretibial skin).  Mohs surgery is indicated for tumors 1 cm or larger in these anatomic locations.

## 2023-04-21 ENCOUNTER — NON-APPOINTMENT (OUTPATIENT)
Age: 68
End: 2023-04-21

## 2023-04-21 LAB
BILIRUB UR QL STRIP: NORMAL
COLLECTION METHOD: NORMAL
GLUCOSE UR-MCNC: NORMAL
HCG UR QL: 0.2 EU/DL
HGB UR QL STRIP.AUTO: NORMAL
KETONES UR-MCNC: NORMAL
LEUKOCYTE ESTERASE UR QL STRIP: NORMAL
NITRITE UR QL STRIP: POSITIVE
PH UR STRIP: 5.5
PROT UR STRIP-MCNC: NORMAL
SP GR UR STRIP: 1.02

## 2023-05-18 ENCOUNTER — RESULT REVIEW (OUTPATIENT)
Age: 68
End: 2023-05-18

## 2023-05-18 ENCOUNTER — NON-APPOINTMENT (OUTPATIENT)
Age: 68
End: 2023-05-18

## 2023-05-18 ENCOUNTER — OUTPATIENT (OUTPATIENT)
Dept: OUTPATIENT SERVICES | Facility: HOSPITAL | Age: 68
LOS: 1 days | End: 2023-05-18
Payer: MEDICAID

## 2023-05-18 DIAGNOSIS — Z98.890 OTHER SPECIFIED POSTPROCEDURAL STATES: Chronic | ICD-10-CM

## 2023-05-18 DIAGNOSIS — Z90.710 ACQUIRED ABSENCE OF BOTH CERVIX AND UTERUS: Chronic | ICD-10-CM

## 2023-05-18 DIAGNOSIS — Z90.49 ACQUIRED ABSENCE OF OTHER SPECIFIED PARTS OF DIGESTIVE TRACT: Chronic | ICD-10-CM

## 2023-05-18 DIAGNOSIS — M54.50 LOW BACK PAIN, UNSPECIFIED: ICD-10-CM

## 2023-05-18 PROCEDURE — 76770 US EXAM ABDO BACK WALL COMP: CPT

## 2023-05-18 PROCEDURE — 76770 US EXAM ABDO BACK WALL COMP: CPT | Mod: 26

## 2023-05-18 PROCEDURE — 74019 RADEX ABDOMEN 2 VIEWS: CPT

## 2023-05-18 PROCEDURE — 74019 RADEX ABDOMEN 2 VIEWS: CPT | Mod: 26

## 2023-05-19 DIAGNOSIS — M54.50 LOW BACK PAIN, UNSPECIFIED: ICD-10-CM

## 2023-06-19 ENCOUNTER — LABORATORY RESULT (OUTPATIENT)
Age: 68
End: 2023-06-19

## 2023-06-20 ENCOUNTER — APPOINTMENT (OUTPATIENT)
Dept: UROLOGY | Facility: CLINIC | Age: 68
End: 2023-06-20
Payer: MEDICAID

## 2023-06-20 VITALS
HEIGHT: 62 IN | DIASTOLIC BLOOD PRESSURE: 90 MMHG | HEART RATE: 71 BPM | WEIGHT: 159 LBS | SYSTOLIC BLOOD PRESSURE: 159 MMHG | TEMPERATURE: 98 F | BODY MASS INDEX: 29.26 KG/M2

## 2023-06-20 DIAGNOSIS — Z87.442 PERSONAL HISTORY OF URINARY CALCULI: ICD-10-CM

## 2023-06-20 DIAGNOSIS — N20.0 CALCULUS OF KIDNEY: ICD-10-CM

## 2023-06-20 DIAGNOSIS — R82.994 HYPERCALCIURIA: ICD-10-CM

## 2023-06-20 PROCEDURE — 99214 OFFICE O/P EST MOD 30 MIN: CPT

## 2023-06-20 RX ORDER — NITROFURANTOIN (MONOHYDRATE/MACROCRYSTALS) 25; 75 MG/1; MG/1
100 CAPSULE ORAL TWICE DAILY
Qty: 14 | Refills: 0 | Status: COMPLETED | COMMUNITY
Start: 2022-10-25 | End: 2023-06-20

## 2023-06-20 RX ORDER — SULFAMETHOXAZOLE AND TRIMETHOPRIM 800; 160 MG/1; MG/1
800-160 TABLET ORAL TWICE DAILY
Qty: 14 | Refills: 0 | Status: COMPLETED | COMMUNITY
Start: 2020-12-10 | End: 2023-06-20

## 2023-06-20 RX ORDER — POLYETHYLENE GLYCOL 3350 17 G/17G
17 POWDER, FOR SOLUTION ORAL
Qty: 238 | Refills: 0 | Status: COMPLETED | COMMUNITY
Start: 2021-02-02 | End: 2023-06-20

## 2023-06-21 PROBLEM — Z87.442 HISTORY OF NEPHROLITHIASIS: Status: ACTIVE | Noted: 2022-06-16

## 2023-06-21 PROBLEM — R82.994 HYPERCALCIURIA: Status: ACTIVE | Noted: 2022-10-20

## 2023-06-21 NOTE — HISTORY OF PRESENT ILLNESS
[FreeTextEntry1] : BEBA HAND is a 67 year old female who presents for consultation for ureteral stone and septic shock sp cystoscopy left ureteral stent insertion with esbl ecoli bacteremia sp left ureteroscopy laser lithotripsy ureteral stent exchange. ureteral stone successfully removed and part of large renal stone, unable to access anterior calyceal ~8mm stone sp left ESWL 3/2021.  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  She denies gross hematuria.  Overall voiding well with some episodes of nocturia although, nonbothersome.\par \par Ultrasound, from 5/18/2023, demonstrated bilateral echogenic foci, likely nonobstructing calculi 5 mm and 6 mm in the right and 5 mm in the left.\par \par KUB x-ray, from 5/18/2023, demonstrates no definitive calculi seen. images visualized by me and i agree w findings \par \par Previously:\par \par BMP 10/2022 : creatinine 0.7// eGFR 95// \par Uric Acid 11/2022 : 3.3 \par PTH 11/2022 \par \par KUB images visualized from 6/22 agree no stones seen x-ray reveals limited evaluation of renal calculi due to overlying bowel.  No definitive calcifications seen overlying the expected regions of the kidneys within these constraints.\par \par Litholink 10/22 - suboptimal urine volume;hypercalciuria,Borderline high urine PH,Mild Hyperuricosuria,Mild CaOX stone risk and high CaP stone risk \par \par Renal sonogram done June 16, 2022.  Bilateral echogenic foci measuring up to 5 mm, possibly representing nonobstructing renal calculi.  No hydronephrosis bilaterally. images visualized \par \par She presented with a ureteral stone and septic shock October 28, 2020 [facility] emergency room her blood pressure was 70/30.\par Blood cultures in the hospital grew out ESBL E. coli.\par \par CT abdomen pelvis images October 2020 showing left hydroureteronephrosis to the level of a 5 mm ureterovesical junction stone on the left side. There are also nonobstructing left renal stones 1 to 1.5 cm in the midpole, visible on . no renal stones on right.\par \par Denies  PMH including previous kidney stones, recurrent UTIs. \par Family History: No  malignancies\par Social History:from [place]\par

## 2023-06-21 NOTE — ASSESSMENT
[FreeTextEntry1] : BEBA HAND is a 67 year old female who presents for consultation for ureteral stone and septic shock sp cystoscopy left ureteral stent insertion with esbl ecoli bacteremia sp left ureteroscopy laser lithotripsy ureteral stent exchange. ureteral stone successfully removed and part of large renal stone, unable to access anterior calyceal ~8mm stone sp left ESWL 3/2021.  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  Imaging demonstrates bilateral small stones.  KUB x-ray negative for stones.\par \par Plan:\par -Increase p.o. fluid, add lemon\par -Follow-up nephrology stone prevention\par -All options reviewed regarding bilateral stones elected for observation\par -Follow-up 1 year symptom index\par -ER precautions reviewed, including episodes of renal colic\par -UA C&S today\par

## 2023-06-26 ENCOUNTER — NON-APPOINTMENT (OUTPATIENT)
Age: 68
End: 2023-06-26

## 2023-08-24 NOTE — PRE-ANESTHESIA EVALUATION ADULT - NSANTHNECKRD_ENT_A_CORE
CC: PATIENT PRESENTS FOR COMPREHENSIVE MEDICAL OCULAR EVALUATION.  PT STATES VA IS STABLE, NO CHANGES    Medications reviewed and updated.  Denies known Latex allergy or symptoms of Latex sensitivity.  Tobacco verified.      PCP:Ziyad Wallis MD  PO\" ALLERGIES, DISC DRUSEN, GLAUCOMA SUSPECT, CONGENITAL CATARACT OS  FAM HX GLAUCOMA-M COUSIN     No

## 2023-11-16 NOTE — ASU PREOP CHECKLIST - TYPE OF SOLUTION
Problem: Discharge Planning  Goal: Discharge to home or other facility with appropriate resources  Outcome: Progressing     Problem: ABCDS Injury Assessment  Goal: Absence of physical injury  Outcome: Progressing     Problem: Safety - Adult  Goal: Free from fall injury  Outcome: Progressing ABX Meropenem

## 2023-11-20 ENCOUNTER — EMERGENCY (EMERGENCY)
Facility: HOSPITAL | Age: 68
LOS: 0 days | Discharge: HOME | End: 2023-11-20
Attending: EMERGENCY MEDICINE
Payer: MEDICAID

## 2023-11-20 VITALS
RESPIRATION RATE: 18 BRPM | WEIGHT: 149.91 LBS | SYSTOLIC BLOOD PRESSURE: 137 MMHG | TEMPERATURE: 98 F | OXYGEN SATURATION: 100 % | HEART RATE: 83 BPM | DIASTOLIC BLOOD PRESSURE: 60 MMHG

## 2023-11-20 DIAGNOSIS — R20.2 PARESTHESIA OF SKIN: ICD-10-CM

## 2023-11-20 DIAGNOSIS — Z90.710 ACQUIRED ABSENCE OF BOTH CERVIX AND UTERUS: Chronic | ICD-10-CM

## 2023-11-20 DIAGNOSIS — R20.0 ANESTHESIA OF SKIN: ICD-10-CM

## 2023-11-20 DIAGNOSIS — Z90.49 ACQUIRED ABSENCE OF OTHER SPECIFIED PARTS OF DIGESTIVE TRACT: ICD-10-CM

## 2023-11-20 DIAGNOSIS — Z90.49 ACQUIRED ABSENCE OF OTHER SPECIFIED PARTS OF DIGESTIVE TRACT: Chronic | ICD-10-CM

## 2023-11-20 DIAGNOSIS — Z90.710 ACQUIRED ABSENCE OF BOTH CERVIX AND UTERUS: ICD-10-CM

## 2023-11-20 DIAGNOSIS — Z98.890 OTHER SPECIFIED POSTPROCEDURAL STATES: Chronic | ICD-10-CM

## 2023-11-20 DIAGNOSIS — Z43.3 ENCOUNTER FOR ATTENTION TO COLOSTOMY: ICD-10-CM

## 2023-11-20 PROCEDURE — 99283 EMERGENCY DEPT VISIT LOW MDM: CPT

## 2023-11-20 PROCEDURE — 99282 EMERGENCY DEPT VISIT SF MDM: CPT

## 2023-11-20 NOTE — ED PROVIDER NOTE - PATIENT PORTAL LINK FT
You can access the FollowMyHealth Patient Portal offered by [facility] by registering at the following website: [email or web address]. By joining [employer]’s FollowMyHealth portal, you will also be able to view your health information using other applications (apps) compatible with our system.

## 2023-11-20 NOTE — ED PROVIDER NOTE - NSFOLLOWUPINSTRUCTIONS_ED_ALL_ED_FT
Our Emergency Department Referral Coordinators will be reaching out to you in the next 24-48 hours from 9:00am to 5:00pm with a follow up appointment. Please expect a phone call from the hospital in that time frame. If you do not receive a call or if you have any questions or concerns, you can reach them at   [phone]    Paresthesia  Paresthesia is a burning or prickling feeling. This feeling can happen in any part of the body. It often happens in the hands, arms, legs, or feet. Usually, it is not painful. In most cases, the feeling goes away in a short time and is not a sign of a serious problem. If you have paresthesia that lasts a long time, you may need to be seen by your doctor.    Follow these instructions at home:  Alcohol use     Do not drink alcohol if:  Your doctor tells you not to drink.   You are pregnant, may be pregnant, or are planning to become pregnant.  If you drink alcohol, limit how much you have:  0–1 drink a day for women.   0–2 drinks a day for men.  Be aware of how much alcohol is in your drink. In the [place] one drink equals one typical bottle of beer (12 oz), one-half glass of wine (5 oz), or one shot of hard liquor (1½ oz).  Nutrition     Eat a healthy diet. This includes:  Eating foods that have a lot of fiber in them, such as fresh fruits and vegetables, whole grains, and beans.  Limiting foods that have a lot of fat and processed sugars in them, such as fried or sweet foods.  General instructions     Take over-the-counter and prescription medicines only as told by your doctor.  Do not use any products that have nicotine or tobacco in them, such as cigarettes and e-cigarettes. If you need help quitting, ask your doctor.  If you have diabetes, work with your doctor to make sure your blood sugar stays in a healthy range.  If your feet feel numb:  Check for redness, warmth, and swelling every day.  Wear padded socks and comfortable shoes. These help protect your feet.  Keep all follow-up visits as told by your doctor. This is important.  Contact a doctor if:  You have paresthesia that gets worse or does not go away.  Your burning or prickling feeling gets worse when you walk.  You have pain or cramps.  You feel dizzy.  You have a rash.  Get help right away if you:  Feel weak.  Have trouble walking or moving.  Have problems speaking, understanding, or seeing.  Feel confused.  Cannot control when you pee (urinate) or poop (have a bowel movement).  Lose feeling (have numbness) after an injury.  Have new weakness in an arm or leg.  Pass out (faint).  Summary  Paresthesia is a burning or prickling feeling. It often happens in the hands, arms, legs, or feet.  In most cases, the feeling goes away in a short time and is not a sign of a serious problem.  If you have paresthesia that lasts a long time, you may need to be seen by your doctor.  This information is not intended to replace advice given to you by your health care provider. Make sure you discuss any questions you have with your health care provider.

## 2023-11-20 NOTE — ED PROVIDER NOTE - OBJECTIVE STATEMENT
68-year-old female with no past medical history presents to the ED complaining of intermittent numbness and tingling from the left shoulder down to the hand for over 1 week.  Patient denies headache, dizziness, chest pain, shortness of breath, weakness, slurred speech or injury.

## 2023-12-01 ENCOUNTER — APPOINTMENT (OUTPATIENT)
Dept: NEUROSURGERY | Facility: CLINIC | Age: 68
End: 2023-12-01
Payer: MEDICAID

## 2023-12-01 VITALS — BODY MASS INDEX: 30.55 KG/M2 | HEIGHT: 62 IN | WEIGHT: 166 LBS

## 2023-12-01 DIAGNOSIS — Z78.9 OTHER SPECIFIED HEALTH STATUS: ICD-10-CM

## 2023-12-01 DIAGNOSIS — R20.0 ANESTHESIA OF SKIN: ICD-10-CM

## 2023-12-01 PROCEDURE — 99204 OFFICE O/P NEW MOD 45 MIN: CPT

## 2024-01-01 NOTE — ED PROVIDER NOTE - DISCHARGE DATE
Learning About Safe Sleep for Babies  Following safe sleep guidelines can help prevent sudden infant death syndrome (SIDS). SIDS is the death of a baby younger than 1 year with no known cause. Talk about safe sleep with anyone who spends time with your baby. Explain in detail what you expect the person to do.    Always put your baby to sleep on their back.   Place your baby on a firm, flat surface to sleep. The safest place for a baby is in a crib, cradle, or bassinet that meets safety standards.     Put your baby to sleep alone in the crib.   Keep soft items (like blankets, stuffed animals, and pillows) and loose bedding out of the crib. They could block your baby's mouth or trap your baby.     Don't use sleep positioners, bumper pads, or other products that attach to the crib. They could block your baby's mouth or trap your baby.   Do not place your baby in a car seat, sling, swing, bouncer, or stroller to sleep.     Have your baby sleep in the same room as you (in their own separate sleep space) for at least the first 6 months--and for the first year, if you can. Don't sleep with your baby. This includes in your bed or on a couch or chair.   Keep the room at a comfortable temperature so that your baby can sleep in lightweight clothes without a blanket.   Follow-up care is a key part of your child's treatment and safety. Be sure to make and go to all appointments, and call your doctor if your child is having problems. It's also a good idea to know your child's test results and keep a list of the medicines your child takes.  Where can you learn more?  Go to https://www.[employer]com.net/patientEd and enter E820 to learn more about \"Learning About Safe Sleep for Babies.\"  Current as of: October 24, 2023  Content Version: 14.1  © 5862-6585 [employer].   Care instructions adapted under license by [employer]. If you have questions about a medical condition or this instruction, always ask your  19-Sep-2022

## 2024-02-13 NOTE — ED PROVIDER NOTE - CONDITION AT DISCHARGE:
Chief Complaint   Patient presents with    Worker's Compensation     Pt arrives for a f/u visit for a worker's compensation injury of a right upper back/shoulder sprain, DOI 2/3/24. Pt states the pain has moved to right elbow but denies any new injury. Pt states that she has been able to follow current work restrictions. Pt denies any use of OTC medication for pain PTA.        History Of Present Illness  Rose is a 21 year old female presenting with follow-up right shoulder and arm/upper back strain from work-related injury  Patient states her pain in the upper back has improved  She still has mild pain in the right shoulder and down the right arm to the right elbow  She is also complaining of mild pain in the antecubital region    Past Medical History  Past Medical History:   Diagnosis Date    No known problems         Surgical History  Past Surgical History:   Procedure Laterality Date    No past surgeries          Social History   reports that she has never smoked. She has never been exposed to tobacco smoke. She has never used smokeless tobacco.    Family History    Family History   Problem Relation Age of Onset    Cancer, Thyroid Mother     Patient is unaware of any medical problems Father     Seizure Disorder Brother     Patient is unaware of any medical problems Maternal Grandmother     Patient is unaware of any medical problems Maternal Grandfather     Diabetes Paternal Grandmother     Diabetes Paternal Grandfather         Allergies  ALLERGIES:  Patient has no known allergies.    Medications  Current Outpatient Medications   Medication Sig Dispense Refill    desogestrel-ethinyl estradiol (Isibloom) 0.15-30 MG-MCG per tablet Take 1 tablet by mouth daily. Indications: Birth Control Treatment       No current facility-administered medications for this visit.       Review of Systems      Eye Problem(s):negative  ENT Problem(s):negative  Cardiovascular problem(s):negative  Respiratory  problem(s):negative  Gastro-intestinal problem(s):negative   Genito-urinary problem(s): negative  Musculoskeletal problem(s):musculoskeletal pain  Integumentary problem(s):negative  Neurological problem(s):negative  Psychiatric problem(s):negative  Endocrine problem(s):negative  Hematologic and/or Lymphatic problem(s):negative    Last Recorded Vitals  Visit Vitals  /84 (BP Location: LUE - Left upper extremity, Patient Position: Sitting)   Pulse 92   Temp 97.8 °F (36.6 °C) (Temporal)   Resp 16   Ht 5' 7\" (1.702 m)   Wt (!) 145.2 kg (320 lb)   LMP 12/20/2023   SpO2 97%   BMI 50.12 kg/m²           Physical Exam    General:  Alert, cooperative, conversive. No acute distress.  Skin:  Warm and dry without rash. Capillary refill brisk and less than 2 seconds.  Head:  Normocephalic-atraumatic.   Neck:  Trachea is midline.     Eyes:  Normal conjunctivae and sclerae.  ENT:  Mucous membranes are moist.  Cardiovascular:  Symmetrical pulses.   Respiratory:  Normal respiratory effort.   Gastrointestinal:  Non-distended.    Musculoskeletal: Upper back F ROM, right shoulder F ROM, Henao and speeds weakly positive.  There is minimal tenderness to the right shoulder.  The right elbow can demonstrate full range of motion.  There is mild tenderness to the antecubital region, but no swelling or ecchymosis or gross anatomic deformity.  There is no biceps tenderness to palpation.   strength is 5 out of 5, M/S5/5.  Cap , 2 sec.  Neurologic:  Oriented times 4.  No focal deficits.      Radiology Reports    XR SHOULDER 3 VIEWS RIGHT  Narrative: EXAM: XR SHOULDER 3 VIEWS RIGHT    CLINICAL INDICATION: Right shoulder pain. Trauma.    COMPARISON: None.    FINDINGS: 3 views. No fracture or dislocation. The glenohumeral and  acromioclavicular joints are intact. Overlying soft tissues unremarkable.  Impression: Negative radiographs of the right shoulder.    Electronically Signed by: EASTON DARNELL M.D.   Signed on: 2/6/2024 2:36 PM    Workstation ID: [id number]        Medical Decision Making    Multiple differential diagnoses were considered. The patient was apprised of diagnostic and treatment options including alternate modes of care, in addition to risks and benefits, for this medical condition. Based on this discussion the patient agrees with this chosen diagnostic and treatment plan.         Factors Influencing Medical Decision Making    Labs Ordered/Results reviewed:  No    X-ray ordered/reviewed: No    I independently reviewed X-rays/Labs: No    Previous medical records reviewed: Yes    Additional Historians: No    Differential Diagnosis: Sprain, Strain, Fracture, Contusion    Differential Diagnoses ruled out: NA    Chronic Conditions/Social Factors affecting MDM: None      Risk of Complications, Morbidity, and/or Mortality:    Presenting problems:  Moderate    Diagnostic procedures:  Moderate    Management options:  Moderate          Diagnosis    Injury of right shoulder, subsequent encounter  (primary encounter diagnosis)  Sprain of upper back, subsequent encounter      Plan    Follow-up Information       Follow up With Specialties Details Why Contact Info    [facility] Follow up in 7 day(s) Between 9am and 3pm [address] [facility] [id number] [phone]               Summary of your Discharge Medications            Accurate as of February 13, 2024  1:45 PM. Always use your most recent med list.                Take these Medications        Details   Isibloom 0.15-30 MG-MCG per tablet   Generic drug: desogestrel-ethinyl estradiol  Take 1 tablet by mouth daily. Indications: Birth Control Treatment              Patient Instructions   Tylenol  Light duty  Rest                  Primary Care Physician  Millie Evans MD Daniel S Kirschner, MD  Motor and sensorium in the right hand is 5 out of 5   Improved

## 2024-04-12 NOTE — PATIENT PROFILE ADULT - NSTRANSFERBELONGINGSRESP_GEN_A_NUR
Patient alert and oriented times four, ambulatory with steady gait. Denies any current pain or discomfort. Verbal and written discharge instructions given. All questions answered. Patient to follow up with PCP or return to ED with new/worsening symptoms.    yes

## 2024-04-16 ENCOUNTER — APPOINTMENT (OUTPATIENT)
Dept: NEUROSURGERY | Facility: CLINIC | Age: 69
End: 2024-04-16

## 2024-06-25 ENCOUNTER — APPOINTMENT (OUTPATIENT)
Dept: UROLOGY | Facility: CLINIC | Age: 69
End: 2024-06-25

## 2024-10-04 ENCOUNTER — APPOINTMENT (OUTPATIENT)
Dept: VASCULAR SURGERY | Facility: CLINIC | Age: 69
End: 2024-10-04

## 2025-01-31 NOTE — ED PROVIDER NOTE - NS_EDPROVIDERDISPOUSERTYPE_ED_A_ED
\"Have you been to the ER, urgent care clinic since your last visit?  Hospitalized since your last visit?\"    NO    “Have you seen or consulted any other health care providers outside our system since your last visit?”    NO             
General:         Right eye: No discharge.         Left eye: No discharge.      Extraocular Movements:      Right eye: Normal extraocular motion.      Left eye: Normal extraocular motion.      Conjunctiva/sclera: Conjunctivae normal.      Right eye: Right conjunctiva is not injected.      Left eye: Left conjunctiva is not injected.   Neck:      Thyroid: No thyroid mass or thyromegaly.      Vascular: No JVD.   Cardiovascular:      Rate and Rhythm: Normal rate and regular rhythm.      Heart sounds: No murmur heard.     No friction rub.   Pulmonary:      Effort: Pulmonary effort is normal. No tachypnea, bradypnea, accessory muscle usage or respiratory distress.      Breath sounds: Normal breath sounds. No wheezing or rales.   Abdominal:      General: Bowel sounds are normal. There is no distension.      Palpations: Abdomen is soft.      Tenderness: There is no abdominal tenderness. There is no rebound.   Musculoskeletal:         General: No tenderness. Normal range of motion.      Cervical back: Normal range of motion and neck supple. No edema or erythema.   Lymphadenopathy:      Head:      Right side of head: No submental or submandibular adenopathy.      Left side of head: No submental or submandibular adenopathy.      Cervical: No cervical adenopathy.   Skin:     General: Skin is warm.      Coloration: Skin is not pale.      Findings: No bruising, ecchymosis or rash.   Neurological:      Mental Status: She is alert and oriented to person, place, and time.      Cranial Nerves: No cranial nerve deficit.      Sensory: No sensory deficit.      Motor: No atrophy or abnormal muscle tone.      Coordination: Coordination normal.   Psychiatric:         Mood and Affect: Mood is not anxious. Affect is not angry.         Speech: Speech is not slurred.         Behavior: Behavior normal. Behavior is not aggressive.         Thought Content: Thought content does not include homicidal ideation.         Cognition and Memory: Memory is 
Attending Attestation (For Attendings USE Only)...

## 2025-02-25 NOTE — ASU PATIENT PROFILE, ADULT - FALLEN IN THE PAST
Today you met with your hematologist/oncologist.  Recent findings were discussed and questions answered.  Scheduling orders were placed.  While we appreciate that you verbalized understanding, if any questions arise after leaving, please do not hesitate to call the office to discuss.  [phone] Shellie Dior.  Please review the new patient information provided to you at today's appointment. Please also review the black binder provided. Dr Gregory is starting you on Anastrozole. You were provided with a handout on this medication, please review it and call the office with any questions you may have concerning this medication. While you should pick this medication up as soon as possible, you should not start this medication until radiation is complete.  You may start this medicine once radiation is completed, your radiation oncologist may recommend you wait two weeks after completion before starting this med. Please follow their recommendations. Dr Gregory will see you once radiation is completed to assess how you are tolerating this new medication. You should also be taking calcium and vitamin D daily. This can be purchased over the counter. Ask the pharmacist which brand and dose they recommend at the pharmacy you regularly fill your medications.     
no